# Patient Record
Sex: FEMALE | Race: WHITE | NOT HISPANIC OR LATINO | Employment: FULL TIME | URBAN - METROPOLITAN AREA
[De-identification: names, ages, dates, MRNs, and addresses within clinical notes are randomized per-mention and may not be internally consistent; named-entity substitution may affect disease eponyms.]

---

## 2019-07-09 ENCOUNTER — OFFICE VISIT (OUTPATIENT)
Dept: FAMILY MEDICINE CLINIC | Facility: CLINIC | Age: 19
End: 2019-07-09

## 2019-07-09 VITALS
BODY MASS INDEX: 34.96 KG/M2 | WEIGHT: 190 LBS | TEMPERATURE: 99.2 F | HEART RATE: 62 BPM | DIASTOLIC BLOOD PRESSURE: 77 MMHG | HEIGHT: 62 IN | OXYGEN SATURATION: 98 % | SYSTOLIC BLOOD PRESSURE: 115 MMHG

## 2019-07-09 DIAGNOSIS — E28.2 PCOS (POLYCYSTIC OVARIAN SYNDROME): Chronic | ICD-10-CM

## 2019-07-09 DIAGNOSIS — Z00.00 PREVENTATIVE HEALTH CARE: Primary | ICD-10-CM

## 2019-07-09 DIAGNOSIS — F41.9 ANXIETY: Chronic | ICD-10-CM

## 2019-07-09 DIAGNOSIS — F33.9 EPISODE OF RECURRENT MAJOR DEPRESSIVE DISORDER, UNSPECIFIED DEPRESSION EPISODE SEVERITY (HCC): Chronic | ICD-10-CM

## 2019-07-09 DIAGNOSIS — G47.00 INSOMNIA, UNSPECIFIED TYPE: Chronic | ICD-10-CM

## 2019-07-09 PROBLEM — F32.A DEPRESSION: Chronic | Status: ACTIVE | Noted: 2019-07-09

## 2019-07-09 PROCEDURE — 99204 OFFICE O/P NEW MOD 45 MIN: CPT | Performed by: NURSE PRACTITIONER

## 2019-07-09 RX ORDER — ETONOGESTREL/ETHINYL ESTRADIOL .12-.015MG
RING, VAGINAL VAGINAL
Refills: 11 | COMMUNITY
Start: 2019-06-11 | End: 2020-07-01

## 2019-07-09 RX ORDER — TRAZODONE HYDROCHLORIDE 50 MG/1
TABLET ORAL
Qty: 60 TABLET | Refills: 2 | Status: SHIPPED | OUTPATIENT
Start: 2019-07-09 | End: 2019-09-10 | Stop reason: SINTOL

## 2019-07-09 NOTE — PROGRESS NOTES
Subjective   Jeremiah Cade is a 19 y.o. female.     19-year-old obese white female with history of depression, panic attacks, PCOS who comes in today the stent which is a new patient.  Patient currently is taking a medication for depression and she declines psychiatry referral at this time.  She also complains of insomnia.  I placed her on Zoloft 80 mg and trazodone for sleep and I am getting basic blood work today   blood pressure 140/76 heart rate 52 she denies any chest pain, dyspnea, tachycardia, dizziness or edema   weight is 190 and [facility] and has had an eye examine this year     Zoloft 50 mg daily   trazodone 50 mg 1/2 to 2 tabs q.at bed time as needed sleep   blood work         The following portions of the patient's history were reviewed and updated as appropriate: allergies, current medications, past family history, past medical history, past social history, past surgical history and problem list.    Review of Systems   Eyes: Negative.    Respiratory: Negative.    Cardiovascular: Negative.    Gastrointestinal: Negative.    Genitourinary: Negative.    Neurological: Negative.    Psychiatric/Behavioral: Positive for decreased concentration, sleep disturbance and depressed mood.       Objective   Physical Exam   Constitutional: She is oriented to person, place, and time. She appears well-developed and well-nourished.   Cardiovascular: Normal rate and regular rhythm.   Pulmonary/Chest: Effort normal and breath sounds normal.   Abdominal: Soft. Bowel sounds are normal.   Musculoskeletal: Normal range of motion.   Neurological: She is alert and oriented to person, place, and time.   Skin: Skin is warm and dry. Depressed state with some issues of anxiety and concentration.         Assessment/Plan   Jeremiah was seen today for new patient.    Diagnoses and all orders for this visit:    Preventative health care  -     CBC (No Diff)  -     Comprehensive Metabolic Panel    PCOS (polycystic  ovarian syndrome)    Episode of recurrent major depressive disorder, unspecified depression episode severity (CMS/HCC)    Anxiety    Insomnia, unspecified type    Other orders  -     sertraline (ZOLOFT) 50 MG tablet; Take 1 tablet by mouth Daily.  -     traZODone (DESYREL) 50 MG tablet; 1/2 - 2 tabs 30 minutes before bedtime

## 2019-07-10 ENCOUNTER — TELEPHONE (OUTPATIENT)
Dept: FAMILY MEDICINE CLINIC | Facility: CLINIC | Age: 19
End: 2019-07-10

## 2019-07-10 LAB
ALBUMIN SERPL-MCNC: 4.5 G/DL (ref 3.5–5.5)
ALBUMIN/GLOB SERPL: 1.5 {RATIO} (ref 1.2–2.2)
ALP SERPL-CCNC: 67 IU/L (ref 39–117)
ALT SERPL-CCNC: 11 IU/L (ref 0–32)
AST SERPL-CCNC: 13 IU/L (ref 0–40)
BILIRUB SERPL-MCNC: 0.2 MG/DL (ref 0–1.2)
BUN SERPL-MCNC: 8 MG/DL (ref 6–20)
BUN/CREAT SERPL: 11 (ref 9–23)
CALCIUM SERPL-MCNC: 9.8 MG/DL (ref 8.7–10.2)
CHLORIDE SERPL-SCNC: 103 MMOL/L (ref 96–106)
CO2 SERPL-SCNC: 21 MMOL/L (ref 20–29)
CREAT SERPL-MCNC: 0.71 MG/DL (ref 0.57–1)
ERYTHROCYTE [DISTWIDTH] IN BLOOD BY AUTOMATED COUNT: 13 % (ref 12.3–15.4)
GLOBULIN SER CALC-MCNC: 3 G/DL (ref 1.5–4.5)
GLUCOSE SERPL-MCNC: 83 MG/DL (ref 65–99)
HCT VFR BLD AUTO: 41.4 % (ref 34–46.6)
HGB BLD-MCNC: 13.9 G/DL (ref 11.1–15.9)
MCH RBC QN AUTO: 29.1 PG (ref 26.6–33)
MCHC RBC AUTO-ENTMCNC: 33.6 G/DL (ref 31.5–35.7)
MCV RBC AUTO: 87 FL (ref 79–97)
PLATELET # BLD AUTO: 239 X10E3/UL (ref 150–450)
POTASSIUM SERPL-SCNC: 4.3 MMOL/L (ref 3.5–5.2)
PROT SERPL-MCNC: 7.5 G/DL (ref 6–8.5)
RBC # BLD AUTO: 4.77 X10E6/UL (ref 3.77–5.28)
SODIUM SERPL-SCNC: 140 MMOL/L (ref 134–144)
WBC # BLD AUTO: 6.8 X10E3/UL (ref 3.4–10.8)

## 2019-07-15 ENCOUNTER — OFFICE VISIT (OUTPATIENT)
Dept: FAMILY MEDICINE CLINIC | Facility: CLINIC | Age: 19
End: 2019-07-15

## 2019-07-15 VITALS
WEIGHT: 191.6 LBS | OXYGEN SATURATION: 97 % | TEMPERATURE: 98.7 F | BODY MASS INDEX: 35.26 KG/M2 | HEIGHT: 62 IN | SYSTOLIC BLOOD PRESSURE: 119 MMHG | DIASTOLIC BLOOD PRESSURE: 80 MMHG | HEART RATE: 61 BPM

## 2019-07-15 DIAGNOSIS — K59.1 FUNCTIONAL DIARRHEA: Chronic | ICD-10-CM

## 2019-07-15 DIAGNOSIS — F51.01 PRIMARY INSOMNIA: Primary | Chronic | ICD-10-CM

## 2019-07-15 PROCEDURE — 99214 OFFICE O/P EST MOD 30 MIN: CPT | Performed by: NURSE PRACTITIONER

## 2019-07-15 RX ORDER — DICYCLOMINE HYDROCHLORIDE 10 MG/1
CAPSULE ORAL
Qty: 60 CAPSULE | Refills: 2 | Status: SHIPPED | OUTPATIENT
Start: 2019-07-15 | End: 2019-11-13

## 2019-07-15 NOTE — PROGRESS NOTES
Subjective   Jeremiah Cade is a 19 y.o. female.     19-year-old obese white female with history of depression, anxiety, panic attacks, PCOS, and insomnia who was here on July 9 placed on Zoloft and trazodone.  Patient states trazodone has helped well for sleep but she feels a little weird on the Zoloft.  Symptoms are very vague and very mild and ice it recommended she try it for couple more weeks before we change the dose and she agreed.  Patient still claiming to have diarrhea and I am placing her on Bentyl to see if it helps with her diarrhea  Blood pressure 118/80 heart rate 60 she denies any chest pain, dyspnea, tachycardia, dizziness or edema    Bentyl 10 mg 1-4 times a day   and         The following portions of the patient's history were reviewed and updated as appropriate: allergies, current medications, past family history, past medical history, past social history, past surgical history and problem list.    Review of Systems   Constitutional: Negative.    Respiratory: Negative.    Cardiovascular: Negative.    Gastrointestinal: Positive for diarrhea.   Genitourinary: Negative.    Musculoskeletal: Negative.    Neurological: Negative.    Psychiatric/Behavioral: Negative.        Objective   Physical Exam   Constitutional: She is oriented to person, place, and time. She appears well-developed and well-nourished.   Eyes: EOM are normal.   Cardiovascular: Normal rate and regular rhythm.   Pulmonary/Chest: Effort normal and breath sounds normal.   Abdominal: Soft. Bowel sounds are normal.   Continuing diarrhea   Musculoskeletal: Normal range of motion.   Neurological: She is alert and oriented to person, place, and time.   Skin: Skin is warm.   Psychiatric: She has a normal mood and affect.         Assessment/Plan   Jeremiah was seen today for diarrhea.    Diagnoses and all orders for this visit:    Primary insomnia    Functional diarrhea    Other orders  -     dicyclomine (BENTYL) 10 MG capsule; Take one capsule  1-4x day prn diarrhea

## 2019-08-06 ENCOUNTER — OFFICE VISIT (OUTPATIENT)
Dept: FAMILY MEDICINE CLINIC | Facility: CLINIC | Age: 19
End: 2019-08-06

## 2019-08-06 VITALS
SYSTOLIC BLOOD PRESSURE: 106 MMHG | WEIGHT: 188 LBS | BODY MASS INDEX: 34.39 KG/M2 | DIASTOLIC BLOOD PRESSURE: 66 MMHG | HEART RATE: 120 BPM | TEMPERATURE: 98.8 F | OXYGEN SATURATION: 99 %

## 2019-08-06 DIAGNOSIS — F41.9 ANXIETY: Chronic | ICD-10-CM

## 2019-08-06 DIAGNOSIS — F51.01 PRIMARY INSOMNIA: Chronic | ICD-10-CM

## 2019-08-06 DIAGNOSIS — F33.9 EPISODE OF RECURRENT MAJOR DEPRESSIVE DISORDER, UNSPECIFIED DEPRESSION EPISODE SEVERITY (HCC): Chronic | ICD-10-CM

## 2019-08-06 DIAGNOSIS — K59.1 FUNCTIONAL DIARRHEA: Primary | Chronic | ICD-10-CM

## 2019-08-06 PROCEDURE — 99213 OFFICE O/P EST LOW 20 MIN: CPT | Performed by: NURSE PRACTITIONER

## 2019-08-06 RX ORDER — GINSENG 100 MG
1 CAPSULE ORAL DAILY
Qty: 30 EACH | Refills: 6 | Status: SHIPPED | OUTPATIENT
Start: 2019-08-06 | End: 2020-01-29

## 2019-08-06 RX ORDER — METRONIDAZOLE 500 MG/1
500 TABLET ORAL 3 TIMES DAILY
Qty: 42 TABLET | Refills: 0 | Status: SHIPPED | OUTPATIENT
Start: 2019-08-06 | End: 2019-08-20

## 2019-08-06 NOTE — PATIENT INSTRUCTIONS
Wean off of Zoloft as directed   take acidophilus and Flagyl as directed along with Bentyl   eat food frequently

## 2019-08-06 NOTE — PROGRESS NOTES
Subjective   Jeremiah Cade is a 19 y.o. female.     19-year-old obese white female history depression anxiety, panic attacks, PCOS and insomnia who was recently placed on Zoloft and trazodone.  After that she began having frequent diarrhea and I placed her on becoming 4 times a day.  We tried weaning off trazodone to see if that was causing the diarrhea but it did not help.  I strongly suspect it is a Zoloft causing diarrhea we are going on for that I am placing her on Flagyl acidophilus along with the Bentyl.   She had recent ER visit and was given a 1 L fluid. Patient has not been eating for fear that she will have a bowel movement which is not helping her diarrhea so she is agree to start eating more.  C. difficile was negative other stool cultures are pending blood pressure 106/66 heart rate 118 she denies any chest pain, dyspnea, tachycardia, dizziness or edema       Flagyl 500 mg three times a day times 14 days   acidophilus 1 billion  daily   wean off of the Zoloft as discussed during exam         The following portions of the patient's history were reviewed and updated as appropriate: allergies, current medications, past family history, past medical history, past social history, past surgical history and problem list.    Review of Systems   Constitutional: Negative.    Respiratory: Negative.    Cardiovascular: Negative.    Gastrointestinal: Positive for diarrhea.   Genitourinary: Negative.    Musculoskeletal: Negative.    Skin: Negative.    Neurological: Negative.    Psychiatric/Behavioral: Negative.        Objective   Physical Exam   Constitutional: She is oriented to person, place, and time. She appears well-developed and well-nourished.   Eyes: EOM are normal.   Cardiovascular: Normal rate.   Pulmonary/Chest: Effort normal and breath sounds normal.   Abdominal: Soft. Bowel sounds are normal.   persistant diarrhea   Musculoskeletal: Normal range of motion.   Neurological: She is alert and oriented to  person, place, and time.   Skin: Skin is warm and dry.         Assessment/Plan   Jeremiah was seen today for diarrhea.    Diagnoses and all orders for this visit:    Functional diarrhea    Episode of recurrent major depressive disorder, unspecified depression episode severity (CMS/HCC)    Anxiety    Primary insomnia    Other orders  -     Probiotic Product (ACIDOPHILUS HIGH-POTENCY) capsule; Take 1 tablet by mouth Daily.  -     metroNIDAZOLE (FLAGYL) 500 MG tablet; Take 1 tablet by mouth 3 (Three) Times a Day for 14 days.

## 2019-08-14 ENCOUNTER — OFFICE VISIT (OUTPATIENT)
Dept: FAMILY MEDICINE CLINIC | Facility: CLINIC | Age: 19
End: 2019-08-14

## 2019-08-14 VITALS
DIASTOLIC BLOOD PRESSURE: 80 MMHG | SYSTOLIC BLOOD PRESSURE: 120 MMHG | HEIGHT: 62 IN | OXYGEN SATURATION: 97 % | HEART RATE: 66 BPM | WEIGHT: 186 LBS | BODY MASS INDEX: 34.23 KG/M2 | TEMPERATURE: 99.3 F

## 2019-08-14 DIAGNOSIS — K59.1 FUNCTIONAL DIARRHEA: Chronic | ICD-10-CM

## 2019-08-14 DIAGNOSIS — R50.9 FEVER AND CHILLS: Primary | ICD-10-CM

## 2019-08-14 DIAGNOSIS — E28.2 PCOS (POLYCYSTIC OVARIAN SYNDROME): Chronic | ICD-10-CM

## 2019-08-14 DIAGNOSIS — R23.2 HOT FLASHES: ICD-10-CM

## 2019-08-14 PROCEDURE — 99213 OFFICE O/P EST LOW 20 MIN: CPT | Performed by: NURSE PRACTITIONER

## 2019-08-14 NOTE — PROGRESS NOTES
Subjective   Jeremiah Cade is a 19 y.o. female.     19-year-old obese white female with history of depression anxiety, panic attacks, PCOS, and insomnia who was recently placed on Zoloft and trazodone who was here on August 6 with complaints of diarrhea all day long.  After investigation we are suspicious Zoloft is causing diarrhea.  I placed her on acidophilus and Flagyl and we are weaning off the Zoloft and she states the diarrhea is improving.  All stool samples were negative.  She does have a low-grade fever today and I suspect a UTI due to all the diarrhea but she was unable to void and she will bring in a urine sample  We are going to continue weaning off Zoloft and if diarrhea does not resolve we will send her to GI  She is also complaining of hot flashes and she has had some recent changes to her birth control so I instructed her to call her OB/GYN to make an appointment  Vital signs are stable     keep weaning off of Zoloft   Bring in urine sample   make appointment with OBGYN   if diarrhea does not totally resolve we will go to  gastroenterology         The following portions of the patient's history were reviewed and updated as appropriate: allergies, current medications, past family history, past medical history, past social history, past surgical history and problem list.    Review of Systems   Constitutional: Negative.    Respiratory: Negative.    Cardiovascular: Negative.    Gastrointestinal: Positive for diarrhea.   Genitourinary:        Having hot flashes   Musculoskeletal: Negative.    Skin: Negative.    Neurological: Negative.    Psychiatric/Behavioral: The patient is nervous/anxious.        Objective   Physical Exam   Constitutional: She is oriented to person, place, and time. She appears well-developed and well-nourished.   Cardiovascular: Normal rate and regular rhythm.   Pulmonary/Chest: Effort normal and breath sounds normal.   Abdominal: Soft. Bowel sounds are normal.   Diarrhea has  improved but is not gone yet   Genitourinary:   Genitourinary Comments: Having hot flashes   Musculoskeletal: Normal range of motion.   Neurological: She is alert and oriented to person, place, and time.   Skin: Skin is warm and dry.   Psychiatric: She has a normal mood and affect.         Assessment/Plan   Jeremiah was seen today for diarrhea.    Diagnoses and all orders for this visit:    Fever and chills  -     POC Urinalysis Dipstick, Automated  -     Urine Culture, Routine - Urine, Clean Catch; Future    Functional diarrhea    PCOS (polycystic ovarian syndrome)    Hot flashes

## 2019-08-21 ENCOUNTER — TELEPHONE (OUTPATIENT)
Dept: FAMILY MEDICINE CLINIC | Facility: CLINIC | Age: 19
End: 2019-08-21

## 2019-09-04 ENCOUNTER — OFFICE VISIT (OUTPATIENT)
Dept: FAMILY MEDICINE CLINIC | Facility: CLINIC | Age: 19
End: 2019-09-04

## 2019-09-04 VITALS
HEIGHT: 62 IN | DIASTOLIC BLOOD PRESSURE: 84 MMHG | WEIGHT: 187 LBS | SYSTOLIC BLOOD PRESSURE: 125 MMHG | HEART RATE: 77 BPM | TEMPERATURE: 98 F | BODY MASS INDEX: 34.41 KG/M2 | OXYGEN SATURATION: 99 %

## 2019-09-04 DIAGNOSIS — K59.1 FUNCTIONAL DIARRHEA: Primary | Chronic | ICD-10-CM

## 2019-09-04 DIAGNOSIS — F51.01 PRIMARY INSOMNIA: Chronic | ICD-10-CM

## 2019-09-04 DIAGNOSIS — F41.9 ANXIETY: Chronic | ICD-10-CM

## 2019-09-04 PROCEDURE — 99214 OFFICE O/P EST MOD 30 MIN: CPT | Performed by: NURSE PRACTITIONER

## 2019-09-04 NOTE — PATIENT INSTRUCTIONS
Restart Zoloft at 25 mg daily for 2 weeks if no diarrhea and then start back at 50 mg daily   may consider doing Bentyl 1 to 2 times a week when restarting Zoloft   use  Zantac or Pepcid as needed for stomach issues   follow-up 1 month

## 2019-09-04 NOTE — PROGRESS NOTES
Subjective   Jeremiah Cade is a 19 y.o. female.     19-year-old obese white female with history depression/anxiety, panic attacks, PCOS and insomnia who we placed on Zoloft and trazodone back in early part of December and she developed chronic diarrhea.  After weaning off all of those 2 medications we are safe and assuming it was a trazodone and we are going to try to go back onto her Zoloft now that she has been free of diarrhea for 2 weeks.  She does have some GERD and I am placing her on Zantac or Pepcid as needed.  She also has some Bentyl and I told her she might want to take that once or twice a week when she starts the Zoloft back just in case.   She states her insomnia has actually improved on its own a little bit and we are going to see what happens when she goes back on Zoloft  Vital signs are stable     follow-up 1  month   restart Zoloft at lower dose 25 mg daily for 2 weeks if no diarrhea increase to 50 mg daily   use Zantac or Pepcid as needed for stomach issues   like consider using Bentyl 1 to twice a week when restarting Zoloft         The following portions of the patient's history were reviewed and updated as appropriate: allergies, current medications, past family history, past medical history, past social history, past surgical history and problem list.    Review of Systems   Constitutional: Negative.    HENT: Negative.    Respiratory: Negative.    Cardiovascular: Negative.    Gastrointestinal: Negative.    Genitourinary: Negative.    Musculoskeletal: Negative.    Skin: Negative.    Neurological: Negative.    Psychiatric/Behavioral: Positive for sleep disturbance. The patient is nervous/anxious.        Objective   Physical Exam   Constitutional: She is oriented to person, place, and time. She appears well-developed and well-nourished.   Cardiovascular: Normal rate and regular rhythm.   Pulmonary/Chest: Effort normal and breath sounds normal.   Abdominal: Soft. Bowel sounds are normal.    Musculoskeletal: Normal range of motion.   Neurological: She is alert and oriented to person, place, and time.   Skin: Skin is warm and dry.   Psychiatric: She has a normal mood and affect.         Assessment/Plan   Problems Addressed this Visit        Digestive    Functional diarrhea - Primary (Chronic)       Other    Anxiety (Chronic)    Insomnia disorder (Chronic)

## 2019-09-06 ENCOUNTER — TELEPHONE (OUTPATIENT)
Dept: FAMILY MEDICINE CLINIC | Facility: CLINIC | Age: 19
End: 2019-09-06

## 2019-09-06 NOTE — TELEPHONE ENCOUNTER
Pt called and said she tried to start back on the Zoloft and it gave her diarrhea.  She is wanting to know if you can call her in lexapro.  SG

## 2019-09-09 RX ORDER — ESCITALOPRAM OXALATE 10 MG/1
10 TABLET ORAL DAILY
Qty: 30 TABLET | Refills: 2 | Status: SHIPPED | OUTPATIENT
Start: 2019-09-09 | End: 2019-11-13

## 2019-09-10 ENCOUNTER — OFFICE VISIT (OUTPATIENT)
Dept: FAMILY MEDICINE CLINIC | Facility: CLINIC | Age: 19
End: 2019-09-10

## 2019-09-10 VITALS
RESPIRATION RATE: 18 BRPM | DIASTOLIC BLOOD PRESSURE: 68 MMHG | HEIGHT: 62 IN | WEIGHT: 186 LBS | BODY MASS INDEX: 34.23 KG/M2 | SYSTOLIC BLOOD PRESSURE: 104 MMHG | TEMPERATURE: 98.7 F | OXYGEN SATURATION: 99 % | HEART RATE: 66 BPM

## 2019-09-10 DIAGNOSIS — K59.1 FUNCTIONAL DIARRHEA: Primary | Chronic | ICD-10-CM

## 2019-09-10 DIAGNOSIS — F33.9 EPISODE OF RECURRENT MAJOR DEPRESSIVE DISORDER, UNSPECIFIED DEPRESSION EPISODE SEVERITY (HCC): Chronic | ICD-10-CM

## 2019-09-10 PROCEDURE — 99213 OFFICE O/P EST LOW 20 MIN: CPT | Performed by: FAMILY MEDICINE

## 2019-09-10 RX ORDER — DIPHENOXYLATE HYDROCHLORIDE AND ATROPINE SULFATE 2.5; .025 MG/1; MG/1
1 TABLET ORAL 4 TIMES DAILY PRN
Qty: 24 TABLET | Refills: 0 | Status: SHIPPED | OUTPATIENT
Start: 2019-09-10 | End: 2019-11-13

## 2019-09-10 NOTE — PROGRESS NOTES
Subjective   Jeremiah Cade is a 19 y.o. female.   Chief Complaint   Patient presents with   • Diarrhea         History of Present Illness   Jeremiah presents today for acute visit due to side effect of medications.  She describes starting trazodone back in July and getting diarrhea after that.  She stopped it and the diarrhea went away.  While she was experiencing diarrhea she tried Imodium, Kaopectate, but with a small amount of it stopped diarrhea, until she stopped the trazodone.  Her depression symptoms worsen and she was given Zoloft as replacement.  The diarrhea came back and she stopped the Zoloft a day or 2 ago.  She was advised by [facility] that they can switch her to Lexapro, but she has not started that yet.  Given this really feels that she needs some type of antidepressant.  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  She will experience diarrhea every 15 to 20 minutes and that is still going on today.  This was her first day and training.  She needs a work note and a plan.    Patient Active Problem List    Diagnosis Date Noted   • Hot flashes 08/14/2019   • Functional diarrhea 07/15/2019   • Depression 07/09/2019   • Anxiety 07/09/2019   • Insomnia disorder 07/09/2019   • PCOS (polycystic ovarian syndrome) 07/09/2019   • Allergic rhinitis 12/09/2014   • Irregular menses 01/02/2014   • Otalgia 10/22/2012   • Acne vulgaris 08/28/2012           History reviewed. No pertinent surgical history.    Current Outpatient Medications:   •  busPIRone (BUSPAR) 30 MG tablet, Take 1 tablet by mouth 2 (Two) Times a Day., Disp: 60 tablet, Rfl: 1  •  dicyclomine (BENTYL) 10 MG capsule, Take one capsule 1-4x day prn diarrhea, Disp: 60 capsule, Rfl: 2  •  diphenoxylate-atropine (LOMOTIL) 2.5-0.025 MG per tablet, Take 1 tablet by mouth 4 (Four) Times a Day As Needed for Diarrhea., Disp: 24 tablet, Rfl: 0  •  escitalopram (LEXAPRO) 10 MG tablet, Take 1 tablet by mouth Daily.,  "Disp: 30 tablet, Rfl: 2  •  NUVARING 0.12-0.015 MG/24HR vaginal ring, INSERT 1 RING VAGINALLY AND LEAVE IN PLACE FOR 3 WEEKS, THEN REMOVE FOR 1 RING-FREE WEEK REPEAT, Disp: , Rfl: 11  •  Probiotic Product (ACIDOPHILUS HIGH-POTENCY) capsule, Take 1 tablet by mouth Daily., Disp: 30 each, Rfl: 6  No Known Allergies  Social History     Socioeconomic History   • Marital status: Legally      Spouse name: Not on file   • Number of children: Not on file   • Years of education: Not on file   • Highest education level: Not on file   Tobacco Use   • Smoking status: Current Every Day Smoker     Years: 0.50     Types: Electronic Cigarette   • Smokeless tobacco: Never Used   Substance and Sexual Activity   • Alcohol use: No     Frequency: Never   • Drug use: No   • Sexual activity: Yes     Partners: Male     Birth control/protection: Other     Family History   Problem Relation Age of Onset   • Hypertension Mother    • Hypertension Father    • Heart attack Father    • Cervical cancer Maternal Grandmother    • Diabetes Paternal Grandfather    • Heart attack Paternal Grandmother 40     The following portions of the patient's history were reviewed and updated as appropriate: allergies, current medications, past social history and problem list.    Review of Systems   Constitutional: Negative for chills and fever.   Respiratory: Negative for cough, choking and wheezing.    Cardiovascular: Negative for palpitations and leg swelling.   Gastrointestinal: Negative for abdominal pain and GERD.   Neurological: Negative for headache.       Objective   /68 (BP Location: Left arm, Patient Position: Sitting, Cuff Size: Large Adult)   Pulse 66   Temp 98.7 °F (37.1 °C) (Oral)   Resp 18   Ht 157.5 cm (62\")   Wt 84.4 kg (186 lb)   LMP  (LMP Unknown)   SpO2 99%   BMI 34.02 kg/m²   Physical Exam   Constitutional: She is oriented to person, place, and time. She appears well-developed and well-nourished.   HENT:   Head: " Normocephalic and atraumatic.   Mouth/Throat: Oropharynx is clear and moist.   Eyes: Conjunctivae and EOM are normal. Pupils are equal, round, and reactive to light.   Neck: Neck supple. No JVD present. No thyromegaly present.   Cardiovascular: Normal rate, regular rhythm, normal heart sounds and intact distal pulses.   No murmur heard.  Pulmonary/Chest: Effort normal and breath sounds normal. No respiratory distress. She has no wheezes. She has no rales. She exhibits no tenderness.   Abdominal: Soft. She exhibits no distension and no mass. Bowel sounds are increased. There is no tenderness. There is no rebound and no guarding.   Musculoskeletal: Normal range of motion. She exhibits no edema.   Lymphadenopathy:     She has no cervical adenopathy.   Neurological: She is alert and oriented to person, place, and time.   Skin: Skin is warm. No rash noted.   Psychiatric: She has a normal mood and affect. Her behavior is normal.               Assessment/Plan   Diagnoses and all orders for this visit:    1. Functional diarrhea (Primary)  -     diphenoxylate-atropine (LOMOTIL) 2.5-0.025 MG per tablet; Take 1 tablet by mouth 4 (Four) Times a Day As Needed for Diarrhea.  Dispense: 24 tablet; Refill: 0    2. Episode of recurrent major depressive disorder, unspecified depression episode severity (CMS/HCC)    She was provided with a work note for today.  I think she should try to go back tomorrow.  I gave her a prescription for Lomotil to try to decrease the symptoms.  Remain off the Zoloft.  After she has had no diarrhea for 3 to 4 days try the Lexapro.  Let us know here if the symptoms do not improve with the Lomotil.  Keep regularly scheduled follow-up with [facility] for treatment of her depression and anxiety.             Return if symptoms worsen or fail to improve.

## 2019-09-17 ENCOUNTER — TELEPHONE (OUTPATIENT)
Dept: FAMILY MEDICINE CLINIC | Facility: CLINIC | Age: 19
End: 2019-09-17

## 2019-09-17 NOTE — TELEPHONE ENCOUNTER
Pt called and said the lexapro is doing the same thing zoloft did to her.  Causing horrible diarrhea.  Wants to know what you want her do it.  Make Appt or just try something different.  SG

## 2019-09-18 RX ORDER — BUSPIRONE HYDROCHLORIDE 30 MG/1
30 TABLET ORAL 2 TIMES DAILY
Qty: 60 TABLET | Refills: 1 | Status: SHIPPED | OUTPATIENT
Start: 2019-09-18 | End: 2019-11-13 | Stop reason: SINTOL

## 2019-09-18 NOTE — TELEPHONE ENCOUNTER
Make sure she has stop taking the Lexapro and the diarrhea has stopped before starting the BusPar 30 mg twice a day that I ordered an [facility]

## 2019-11-13 ENCOUNTER — OFFICE VISIT (OUTPATIENT)
Dept: FAMILY MEDICINE CLINIC | Facility: CLINIC | Age: 19
End: 2019-11-13

## 2019-11-13 VITALS
HEART RATE: 68 BPM | BODY MASS INDEX: 33.86 KG/M2 | SYSTOLIC BLOOD PRESSURE: 115 MMHG | WEIGHT: 184 LBS | OXYGEN SATURATION: 97 % | DIASTOLIC BLOOD PRESSURE: 70 MMHG | TEMPERATURE: 97.8 F | HEIGHT: 62 IN | RESPIRATION RATE: 16 BRPM

## 2019-11-13 DIAGNOSIS — K58.1 IRRITABLE BOWEL SYNDROME WITH CONSTIPATION: Primary | ICD-10-CM

## 2019-11-13 DIAGNOSIS — F33.9 EPISODE OF RECURRENT MAJOR DEPRESSIVE DISORDER, UNSPECIFIED DEPRESSION EPISODE SEVERITY (HCC): Chronic | ICD-10-CM

## 2019-11-13 PROCEDURE — 99213 OFFICE O/P EST LOW 20 MIN: CPT | Performed by: FAMILY MEDICINE

## 2019-11-13 NOTE — PROGRESS NOTES
Subjective   Jeremiah Cade is a 19 y.o. female.   Chief Complaint   Patient presents with   • GI Problem     would like referral to GI for c/o changing bowel patterns even after stopping anti-depressants that were causing issues.        History of Present Illness   Returns today to follow-up on depression.  I saw her back in September once.  She had side effects of Zoloft.  He stopped that and was transitioned to a low dose of Lexapro.  Since that time, has stopped lexapro and still having bowel problems.  Describes having constipation alternating with diarrhea.  Bentyl has slowed down the diarrhea.  She tells me her bowels were fine until June or July of this year.  That was around the time that she was on Zoloft.  Initially she had diarrhea 5-6 times a day.  She was on the toilet for 30 minutes of time.  This was suspected to be a side effect of Zoloft.  It was discontinued and she was eventually trialed on Lexapro, which may also cause diarrhea.  She is been off all medicines for 2 months.  Since that time she is been mostly constipated.  She will occasionally have a day or 2 of prominently diarrheal stools the last day or 2.  She describes a lot of abdominal bloating and gas.      Patient Active Problem List    Diagnosis Date Noted   • Hot flashes 08/14/2019   • Functional diarrhea 07/15/2019   • Depression 07/09/2019   • Anxiety 07/09/2019   • Insomnia disorder 07/09/2019   • PCOS (polycystic ovarian syndrome) 07/09/2019   • Allergic rhinitis 12/09/2014   • Irregular menses 01/02/2014   • Otalgia 10/22/2012   • Acne vulgaris 08/28/2012           History reviewed. No pertinent surgical history.  Current Outpatient Medications on File Prior to Visit   Medication Sig   • NUVARING 0.12-0.015 MG/24HR vaginal ring INSERT 1 RING VAGINALLY AND LEAVE IN PLACE FOR 3 WEEKS, THEN REMOVE FOR 1 RING-FREE WEEK REPEAT   • Probiotic Product (ACIDOPHILUS HIGH-POTENCY) capsule Take 1 tablet by mouth Daily. (Patient taking  differently: Take 1 tablet by mouth Daily As Needed.)   • [DISCONTINUED] busPIRone (BUSPAR) 30 MG tablet Take 1 tablet by mouth 2 (Two) Times a Day.   • [DISCONTINUED] dicyclomine (BENTYL) 10 MG capsule Take one capsule 1-4x day prn diarrhea   • [DISCONTINUED] diphenoxylate-atropine (LOMOTIL) 2.5-0.025 MG per tablet Take 1 tablet by mouth 4 (Four) Times a Day As Needed for Diarrhea.   • [DISCONTINUED] escitalopram (LEXAPRO) 10 MG tablet Take 1 tablet by mouth Daily.     No current facility-administered medications on file prior to visit.      No Known Allergies  Social History     Socioeconomic History   • Marital status: Legally      Spouse name: Not on file   • Number of children: Not on file   • Years of education: Not on file   • Highest education level: Not on file   Tobacco Use   • Smoking status: Current Every Day Smoker     Years: 0.50     Types: Electronic Cigarette   • Smokeless tobacco: Never Used   Substance and Sexual Activity   • Alcohol use: No     Frequency: Never   • Drug use: No   • Sexual activity: Yes     Partners: Male     Birth control/protection: Other     Family History   Problem Relation Age of Onset   • Hypertension Mother    • Hypertension Father    • Heart attack Father    • Cervical cancer Maternal Grandmother    • Diabetes Paternal Grandfather    • Heart attack Paternal Grandmother 40     The following portions of the patient's history were reviewed and updated as appropriate: allergies, current medications and problem list.    Review of Systems   Constitutional: Positive for unexpected weight loss (has lost 10 pounds). Negative for chills and fever.   Respiratory: Negative for cough and shortness of breath.    Cardiovascular: Negative for chest pain.   Neurological: Negative for light-headedness and headache.       Objective   /70 (BP Location: Right arm, Patient Position: Sitting, Cuff Size: Adult)   Pulse 68   Temp 97.8 °F (36.6 °C) (Oral)   Resp 16   Ht 157.5 cm  "(62\")   Wt 83.5 kg (184 lb)   SpO2 97%   BMI 33.65 kg/m²   Physical Exam   Constitutional: She is oriented to person, place, and time. She appears well-developed and well-nourished.   HENT:   Head: Normocephalic and atraumatic.   Eyes: Conjunctivae and EOM are normal.   Neck: Normal range of motion.   Cardiovascular: Normal rate.   Pulmonary/Chest: Effort normal.   Abdominal: Soft. She exhibits distension. She exhibits no mass. Bowel sounds are increased. There is generalized tenderness. There is no rebound and no guarding.   Musculoskeletal: Normal range of motion.   Neurological: She is alert and oriented to person, place, and time.   Skin: Skin is warm and dry. No rash noted.   Psychiatric: She has a normal mood and affect. Her behavior is normal.       Assessment/Plan   Diagnoses and all orders for this visit:    1. Episode of recurrent major depressive disorder, unspecified depression episode severity (CMS/HCC) (Primary)    2. Irritable bowel syndrome with constipation  -     Ambulatory Referral to Gastroenterology    Given samples of Linzess 145 mcg tablets #14.        It sounds like constipation dominant irritable bowel syndrome.  They could even be the stress that she was experiencing that prompted the initiation of Zoloft could have worsened the irritable bowel syndrome.  She had stool testing done early August at [facility] that was all negative.  We will not repeat that at the present time.  We will do a trial of Linzess 145 mcg/day and refer her to GI for further evaluation.  I note that she had CT scan of her pelvis that was essentially normal in August of last year.  That CT was limited to her pelvis, so repeat CT scan and/or endoscopy may be necessary.  It does not sound like diarrhea has been predominant enough that I would test her for inflammatory bowel disease just yet, but that remains in the differential.  Recheck here about 2 weeks after she had a trial of Linzess.  Call if any problems " or concerns before then.  We will need to reevaluate her depression at a later date.       Return in about 2 weeks (around 11/27/2019), or if symptoms worsen or fail to improve.

## 2019-11-18 ENCOUNTER — TELEPHONE (OUTPATIENT)
Dept: FAMILY MEDICINE CLINIC | Facility: CLINIC | Age: 19
End: 2019-11-18

## 2019-11-18 NOTE — TELEPHONE ENCOUNTER
Pt started taking linzess last week, she hasnt taken any today. She has had horrible diarrhea since starting, cant stay out of the bathroom, please advise.

## 2019-11-18 NOTE — TELEPHONE ENCOUNTER
Agree with advice to stop the Linzess.  She had been constipated  recently, and as that is no longer the case, I think allowing return of normal bowel function will be appropriate.

## 2020-01-28 NOTE — PROGRESS NOTES
Subjective   Jeremiah Cade is a 19 y.o. female.   Chief Complaint   Patient presents with   • Dizziness     feels like she has been on a merry go round for the past week associated with nausea and light headedness.        History of Present Illness   Presents to the office today for an acute problem.  For the past week she has had symptoms of lightheadedness and dizziness.  Sometimes this feels like she is on a merry-go-round.  She has had occasional nausea.  Occasional headache.  She will have hot flashes where she feels suddenly warm.  Denies any cough, body aches.  At the onset of the lightheadedness and dizziness, she had several episodes of vomiting.  This was not preceded by any abdominal pain.  Gradually all the above symptoms do seem to be improving.  She is continuing to eat and drink.    She is not pregnant.  She uses a NuvaRing.      Patient Active Problem List    Diagnosis Date Noted   • Hot flashes 08/14/2019   • Functional diarrhea 07/15/2019   • Depression 07/09/2019   • Anxiety 07/09/2019   • Insomnia disorder 07/09/2019   • PCOS (polycystic ovarian syndrome) 07/09/2019   • Allergic rhinitis 12/09/2014   • Irregular menses 01/02/2014   • Otalgia 10/22/2012   • Acne vulgaris 08/28/2012           History reviewed. No pertinent surgical history.  Current Outpatient Medications on File Prior to Visit   Medication Sig   • NUVARING 0.12-0.015 MG/24HR vaginal ring INSERT 1 RING VAGINALLY AND LEAVE IN PLACE FOR 3 WEEKS, THEN REMOVE FOR 1 RING-FREE WEEK REPEAT   • [DISCONTINUED] Probiotic Product (ACIDOPHILUS HIGH-POTENCY) capsule Take 1 tablet by mouth Daily. (Patient taking differently: Take 1 tablet by mouth Daily As Needed.)     No current facility-administered medications on file prior to visit.      No Known Allergies  Social History     Socioeconomic History   • Marital status: Legally      Spouse name: Not on file   • Number of children: Not on file   • Years of education: Not on file   •  "Highest education level: Not on file   Tobacco Use   • Smoking status: Current Every Day Smoker     Years: 0.50     Types: Electronic Cigarette   • Smokeless tobacco: Never Used   Substance and Sexual Activity   • Alcohol use: No     Frequency: Never   • Drug use: No   • Sexual activity: Yes     Partners: Male     Birth control/protection: Other     Family History   Problem Relation Age of Onset   • Hypertension Mother    • Hypertension Father    • Heart attack Father    • Cervical cancer Maternal Grandmother    • Diabetes Paternal Grandfather    • Heart attack Paternal Grandmother 40     The following portions of the patient's history were reviewed and updated as appropriate: allergies, current medications, past family history, past medical history, past social history, past surgical history and problem list.    Review of Systems   Constitutional: Negative for chills and fever.   Eyes: Negative for visual disturbance.   Respiratory: Negative for cough and shortness of breath.    Cardiovascular: Negative for chest pain.   Gastrointestinal: Negative for abdominal pain.   Neurological: Positive for light-headedness. Negative for headache.       Objective   /71 (BP Location: Right arm, Patient Position: Sitting, Cuff Size: Large Adult)   Pulse 67   Temp 99.1 °F (37.3 °C) (Oral)   Resp 16   Ht 157.5 cm (62\")   Wt 83.5 kg (184 lb)   SpO2 98%   BMI 33.65 kg/m²   Physical Exam   Constitutional: She is oriented to person, place, and time. She appears well-developed and well-nourished.   HENT:   Head: Normocephalic and atraumatic.   Right Ear: External ear normal. No tenderness. Tympanic membrane is not injected, not perforated, not erythematous and not bulging. A middle ear effusion (clear) is present. No decreased hearing is noted.   Left Ear: External ear normal. No tenderness. Tympanic membrane is not injected, not perforated, not erythematous and not bulging. A middle ear effusion (clear) is present. No " decreased hearing is noted.   Nose: Nose normal. Right sinus exhibits no maxillary sinus tenderness and no frontal sinus tenderness. Left sinus exhibits no maxillary sinus tenderness and no frontal sinus tenderness.   Mouth/Throat: Uvula is midline and mucous membranes are normal. Posterior oropharyngeal erythema (mild) present. Tonsils are 0 on the right. Tonsils are 0 on the left.   Eyes: Conjunctivae and EOM are normal.   Neck: Normal range of motion.   Cardiovascular: Normal rate, regular rhythm and normal heart sounds.   Pulmonary/Chest: Effort normal and breath sounds normal. No respiratory distress.   Musculoskeletal: Normal range of motion.   Neurological: She is alert and oriented to person, place, and time.   Skin: Skin is warm and dry. No rash noted.   Psychiatric: She has a normal mood and affect. Her behavior is normal.               Assessment/Plan   Diagnoses and all orders for this visit:    1. Labyrinthitis of both ears (Primary)    Other orders  -     meclizine (ANTIVERT) 12.5 MG tablet; Take 1 tablet by mouth 3 (Three) Times a Day for 5 days.  Dispense: 15 tablet; Refill: 0      Seems to be viral labyrinthitis.  Treat with Antivert 1 pill 3 times a day for 5 days.  Make sure she keeps fluid intake up.  Call me early next week if symptoms worsen or fail to improve as expected.  Counseled her that there is a possibility of a secondary infection, so if she gets worse, call us back.            Return if symptoms worsen or fail to improve.    Call with any problems or concerns before next visit

## 2020-01-29 ENCOUNTER — OFFICE VISIT (OUTPATIENT)
Dept: FAMILY MEDICINE CLINIC | Facility: CLINIC | Age: 20
End: 2020-01-29

## 2020-01-29 VITALS
DIASTOLIC BLOOD PRESSURE: 71 MMHG | RESPIRATION RATE: 16 BRPM | WEIGHT: 184 LBS | TEMPERATURE: 99.1 F | SYSTOLIC BLOOD PRESSURE: 106 MMHG | BODY MASS INDEX: 33.86 KG/M2 | HEIGHT: 62 IN | HEART RATE: 67 BPM | OXYGEN SATURATION: 98 %

## 2020-01-29 DIAGNOSIS — H83.03 LABYRINTHITIS OF BOTH EARS: Primary | ICD-10-CM

## 2020-01-29 PROCEDURE — 99213 OFFICE O/P EST LOW 20 MIN: CPT | Performed by: FAMILY MEDICINE

## 2020-01-29 RX ORDER — MECLIZINE HCL 12.5 MG/1
12.5 TABLET ORAL
Qty: 15 TABLET | Refills: 0 | Status: SHIPPED | OUTPATIENT
Start: 2020-01-29 | End: 2020-02-03

## 2020-02-19 ENCOUNTER — OFFICE VISIT (OUTPATIENT)
Dept: FAMILY MEDICINE CLINIC | Facility: CLINIC | Age: 20
End: 2020-02-19

## 2020-02-19 VITALS
RESPIRATION RATE: 16 BRPM | TEMPERATURE: 98.9 F | WEIGHT: 181 LBS | OXYGEN SATURATION: 98 % | HEIGHT: 62 IN | BODY MASS INDEX: 33.31 KG/M2

## 2020-02-19 DIAGNOSIS — H66.002 NON-RECURRENT ACUTE SUPPURATIVE OTITIS MEDIA OF LEFT EAR WITHOUT SPONTANEOUS RUPTURE OF TYMPANIC MEMBRANE: Primary | ICD-10-CM

## 2020-02-19 DIAGNOSIS — J02.9 SORE THROAT: ICD-10-CM

## 2020-02-19 DIAGNOSIS — F33.9 EPISODE OF RECURRENT MAJOR DEPRESSIVE DISORDER, UNSPECIFIED DEPRESSION EPISODE SEVERITY (HCC): Chronic | ICD-10-CM

## 2020-02-19 LAB
EXPIRATION DATE: NORMAL
INTERNAL CONTROL: NORMAL
Lab: NORMAL
S PYO AG THROAT QL: NEGATIVE

## 2020-02-19 PROCEDURE — 87880 STREP A ASSAY W/OPTIC: CPT | Performed by: FAMILY MEDICINE

## 2020-02-19 PROCEDURE — 99213 OFFICE O/P EST LOW 20 MIN: CPT | Performed by: FAMILY MEDICINE

## 2020-02-19 RX ORDER — AMOXICILLIN AND CLAVULANATE POTASSIUM 875; 125 MG/1; MG/1
1 TABLET, FILM COATED ORAL 2 TIMES DAILY
Qty: 14 TABLET | Refills: 0 | Status: SHIPPED | OUTPATIENT
Start: 2020-02-19 | End: 2020-02-26

## 2020-02-19 NOTE — PROGRESS NOTES
Subjective   Jeremiah Cade is a 19 y.o. female.   Chief Complaint   Patient presents with   • Dizziness     pronounced with hot flashes, sore throat upon rising.       History of Present Illness   Comes in today with ongoing dizziness.  It is a little better.  I saw her 3 weeks ago.  Diagnosis then was labyrinthitis, probably viral.  She was treated with meclizine.  She did not have any dizziness at all while on the meclizine.  She used the mall in a few days ago she began developing occasional hot flashes and general lightheadedness.  She denies any fevers or chills.  When she swallows her left ear pops.  Throat is sometimes sore when swallowing.  Worse in the mornings.  Admits to hot flashes are rare.  She recently was changed to a generic NuvaRing, and she read that that might cause occasional hot flashes.    She also has some anxiety and depression.  She had a lot of problems with side effects of SSRIs.  Was not able to work x 2 months.  Has been off all antidepressants x months.   Restarted zoloft recently at low dose few weeks ago- no diarrhea, no other SE!!  Mood feels much more stable.  Less anxious.  More optimistic.  Back to work.      Patient Active Problem List    Diagnosis Date Noted   • Hot flashes 08/14/2019   • Functional diarrhea 07/15/2019   • Depression 07/09/2019   • Anxiety 07/09/2019   • Insomnia disorder 07/09/2019   • PCOS (polycystic ovarian syndrome) 07/09/2019   • Allergic rhinitis 12/09/2014   • Irregular menses 01/02/2014   • Otalgia 10/22/2012   • Acne vulgaris 08/28/2012           No past surgical history on file.  Current Outpatient Medications on File Prior to Visit   Medication Sig   • NUVARING 0.12-0.015 MG/24HR vaginal ring INSERT 1 RING VAGINALLY AND LEAVE IN PLACE FOR 3 WEEKS, THEN REMOVE FOR 1 RING-FREE WEEK REPEAT   • [DISCONTINUED] sertraline (ZOLOFT) 50 MG tablet Take 25 mg by mouth Daily.     No current facility-administered medications on file prior to visit.      No  "Known Allergies  Social History     Socioeconomic History   • Marital status: Legally      Spouse name: Not on file   • Number of children: Not on file   • Years of education: Not on file   • Highest education level: Not on file   Tobacco Use   • Smoking status: Current Every Day Smoker     Packs/day: 0.00     Years: 0.50     Pack years: 0.00     Types: Electronic Cigarette   • Smokeless tobacco: Never Used   Substance and Sexual Activity   • Alcohol use: No     Frequency: Never   • Drug use: No   • Sexual activity: Yes     Partners: Male     Birth control/protection: Inserts, Other     Family History   Problem Relation Age of Onset   • Hypertension Mother    • Hypertension Father    • Heart attack Father    • Cervical cancer Maternal Grandmother    • Cancer Maternal Grandmother    • Diabetes Paternal Grandfather    • Heart attack Paternal Grandmother 40     The following portions of the patient's history were reviewed and updated as appropriate: allergies, current medications, past social history and problem list    Review of Systems   Constitutional: Negative for chills and fever.   Respiratory: Negative for cough and shortness of breath.    Cardiovascular: Negative for chest pain and palpitations.   Gastrointestinal: Negative for abdominal pain, diarrhea, nausea and vomiting.   Neurological: Positive for light-headedness. Negative for syncope and headache.       Objective   Temp 98.9 °F (37.2 °C) (Oral)   Resp 16   Ht 157.5 cm (62\")   Wt 82.1 kg (181 lb)   SpO2 98%   BMI 33.11 kg/m²   Physical Exam   Constitutional: She is oriented to person, place, and time. She appears well-developed and well-nourished.   HENT:   Head: Normocephalic and atraumatic.   Right Ear: Tympanic membrane, external ear and ear canal normal. cerumen impaction is not present.  Left Ear: External ear and ear canal normal. Tympanic membrane is injected, erythematous and bulging. A middle ear effusion is present. An impacted " cerumen is not present.  Eyes: Conjunctivae and EOM are normal.   Neck: Normal range of motion.   Cardiovascular: Normal rate, regular rhythm and normal heart sounds.   No murmur heard.  Pulmonary/Chest: Effort normal and breath sounds normal. No respiratory distress.   Musculoskeletal: Normal range of motion.   Neurological: She is alert and oriented to person, place, and time.   Skin: Skin is warm and dry. No rash noted.   Psychiatric: She has a normal mood and affect. Her behavior is normal.           Office Visit on 02/19/2020   Component Date Value Ref Range Status   • Rapid Strep A Screen 02/19/2020 Negative  Negative, VALID, INVALID, Not Performed Final   • Internal Control 02/19/2020 Passed  Passed Final   • Lot Number 02/19/2020 [id number]   Final   • Expiration Date 02/19/2020 [id number]   Final         Assessment/Plan   Diagnoses and all orders for this visit:    1. Non-recurrent acute suppurative otitis media of left ear without spontaneous rupture of tympanic membrane (Primary)  -     amoxicillin-clavulanate (AUGMENTIN) 875-125 MG per tablet; Take 1 tablet by mouth 2 (Two) Times a Day for 7 days.  Dispense: 14 tablet; Refill: 0    2. Sore throat  -     POCT rapid strep A    3. Episode of recurrent major depressive disorder, unspecified depression episode severity (CMS/HCC)    I suspect she was developing a viral URI.  Largely the symptoms seem to have improved, and she appears to have a secondary left otitis media now.  Recommend continuing Mucinex twice daily, lots of water.  Treat with Augmentin as above.  If symptoms recur, fail to improve, or worsen, let me know.  Since depression is responding to Zoloft and she is tolerating it, continue that at current dose of 25 mg/day.  Follow-up on depression later this summer.           Return if symptoms worsen or fail to improve.    Call with any problems or concerns before next visit

## 2020-02-20 RX ORDER — SERTRALINE HYDROCHLORIDE 25 MG/1
1 TABLET, FILM COATED ORAL DAILY
COMMUNITY
End: 2020-02-20 | Stop reason: SDUPTHER

## 2020-02-20 RX ORDER — SERTRALINE HYDROCHLORIDE 25 MG/1
25 TABLET, FILM COATED ORAL DAILY
Qty: 90 TABLET | Refills: 3 | Status: SHIPPED | OUTPATIENT
Start: 2020-02-20 | End: 2020-07-31

## 2020-02-20 NOTE — TELEPHONE ENCOUNTER
Patient called into office and would like a refill on her 25mg of Zoloft to [facility]. Medication is loaded and ready to send.

## 2020-02-24 ENCOUNTER — TELEPHONE (OUTPATIENT)
Dept: FAMILY MEDICINE CLINIC | Facility: CLINIC | Age: 20
End: 2020-02-24

## 2020-02-24 NOTE — TELEPHONE ENCOUNTER
Received voicemail from patient stating that she is having diarrhea associated with the use of Augmentin. Returned patient's call at this time. Advised her that loose stool is common with antibiotic use. Advised her that she may use OTC Immodium for the loose stools and that she may also utilize Align or Activia to help maintain the good GI gisele. She voiced understanding. Just FYI.

## 2020-02-24 NOTE — TELEPHONE ENCOUNTER
Agree with the above advice.  If she calls back and continues to have diarrhea despite these treatments.  Discontinue the Augmentin and let me know.  Thanks

## 2020-03-02 ENCOUNTER — HOSPITAL ENCOUNTER (EMERGENCY)
Facility: HOSPITAL | Age: 20
Discharge: HOME | End: 2020-03-03
Attending: EMERGENCY MEDICINE | Admitting: EMERGENCY MEDICINE

## 2020-03-02 ENCOUNTER — APPOINTMENT (OUTPATIENT)
Dept: CT IMAGING | Facility: HOSPITAL | Age: 20
End: 2020-03-02

## 2020-03-02 DIAGNOSIS — R50.9 FEVER AND CHILLS: Primary | ICD-10-CM

## 2020-03-02 DIAGNOSIS — K62.89 PERIANAL PAIN: ICD-10-CM

## 2020-03-02 LAB
ALBUMIN SERPL-MCNC: 4.2 G/DL (ref 3.5–5.2)
ALBUMIN/GLOB SERPL: 1.3 G/DL
ALP SERPL-CCNC: 85 U/L (ref 39–117)
ALT SERPL W P-5'-P-CCNC: 22 U/L (ref 1–33)
ANION GAP SERPL CALCULATED.3IONS-SCNC: 13 MMOL/L (ref 5–15)
AST SERPL-CCNC: 16 U/L (ref 1–32)
BASOPHILS # BLD AUTO: 0.1 10*3/MM3 (ref 0–0.2)
BASOPHILS NFR BLD AUTO: 0.7 % (ref 0–1.5)
BILIRUB SERPL-MCNC: 0.3 MG/DL (ref 0.2–1.2)
BUN BLD-MCNC: 6 MG/DL (ref 6–20)
BUN/CREAT SERPL: 9.4 (ref 7–25)
CALCIUM SPEC-SCNC: 9.3 MG/DL (ref 8.6–10.5)
CHLORIDE SERPL-SCNC: 100 MMOL/L (ref 98–107)
CO2 SERPL-SCNC: 25 MMOL/L (ref 22–29)
CREAT BLD-MCNC: 0.64 MG/DL (ref 0.57–1)
DEPRECATED RDW RBC AUTO: 38.9 FL (ref 37–54)
EOSINOPHIL # BLD AUTO: 0.1 10*3/MM3 (ref 0–0.4)
EOSINOPHIL NFR BLD AUTO: 0.5 % (ref 0.3–6.2)
ERYTHROCYTE [DISTWIDTH] IN BLOOD BY AUTOMATED COUNT: 12.6 % (ref 12.3–15.4)
GFR SERPL CREATININE-BSD FRML MDRD: 117 ML/MIN/1.73
GLOBULIN UR ELPH-MCNC: 3.3 GM/DL
GLUCOSE BLD-MCNC: 109 MG/DL (ref 65–99)
HCT VFR BLD AUTO: 41 % (ref 34–46.6)
HGB BLD-MCNC: 13.6 G/DL (ref 12–15.9)
LYMPHOCYTES # BLD AUTO: 0.6 10*3/MM3 (ref 0.7–3.1)
LYMPHOCYTES NFR BLD AUTO: 4.3 % (ref 19.6–45.3)
MCH RBC QN AUTO: 29 PG (ref 26.6–33)
MCHC RBC AUTO-ENTMCNC: 33.1 G/DL (ref 31.5–35.7)
MCV RBC AUTO: 87.7 FL (ref 79–97)
MONOCYTES # BLD AUTO: 0.6 10*3/MM3 (ref 0.1–0.9)
MONOCYTES NFR BLD AUTO: 4.5 % (ref 5–12)
NEUTROPHILS # BLD AUTO: 12.2 10*3/MM3 (ref 1.7–7)
NEUTROPHILS NFR BLD AUTO: 90 % (ref 42.7–76)
NRBC BLD AUTO-RTO: 0 /100 WBC (ref 0–0.2)
PLATELET # BLD AUTO: 190 10*3/MM3 (ref 140–450)
PMV BLD AUTO: 10 FL (ref 6–12)
POTASSIUM BLD-SCNC: 3.8 MMOL/L (ref 3.5–5.2)
PROT SERPL-MCNC: 7.5 G/DL (ref 6–8.5)
RBC # BLD AUTO: 4.68 10*6/MM3 (ref 3.77–5.28)
SODIUM BLD-SCNC: 138 MMOL/L (ref 136–145)
WBC NRBC COR # BLD: 13.6 10*3/MM3 (ref 3.4–10.8)

## 2020-03-02 PROCEDURE — 0 IOPAMIDOL PER 1 ML: Performed by: EMERGENCY MEDICINE

## 2020-03-02 PROCEDURE — 99285 EMERGENCY DEPT VISIT HI MDM: CPT

## 2020-03-02 PROCEDURE — 80053 COMPREHEN METABOLIC PANEL: CPT | Performed by: NURSE PRACTITIONER

## 2020-03-02 PROCEDURE — 74177 CT ABD & PELVIS W/CONTRAST: CPT

## 2020-03-02 PROCEDURE — 85025 COMPLETE CBC W/AUTO DIFF WBC: CPT | Performed by: NURSE PRACTITIONER

## 2020-03-02 PROCEDURE — 87040 BLOOD CULTURE FOR BACTERIA: CPT | Performed by: NURSE PRACTITIONER

## 2020-03-02 RX ORDER — ONDANSETRON 4 MG/1
4 TABLET, ORALLY DISINTEGRATING ORAL 4 TIMES DAILY PRN
Qty: 10 TABLET | Refills: 0 | Status: SHIPPED | OUTPATIENT
Start: 2020-03-02 | End: 2020-07-01

## 2020-03-02 RX ORDER — SODIUM CHLORIDE 0.9 % (FLUSH) 0.9 %
10 SYRINGE (ML) INJECTION AS NEEDED
Status: DISCONTINUED | OUTPATIENT
Start: 2020-03-02 | End: 2020-03-03 | Stop reason: HOSPADM

## 2020-03-02 RX ORDER — ACETAMINOPHEN AND CODEINE PHOSPHATE 300; 30 MG/1; MG/1
2 TABLET ORAL EVERY 6 HOURS PRN
Qty: 20 TABLET | Refills: 0 | Status: SHIPPED | OUTPATIENT
Start: 2020-03-02 | End: 2020-03-06 | Stop reason: SDUPTHER

## 2020-03-02 RX ORDER — IBUPROFEN 400 MG/1
800 TABLET ORAL ONCE
Status: COMPLETED | OUTPATIENT
Start: 2020-03-02 | End: 2020-03-02

## 2020-03-02 RX ORDER — SULFAMETHOXAZOLE AND TRIMETHOPRIM 800; 160 MG/1; MG/1
1 TABLET ORAL 2 TIMES DAILY
Qty: 20 TABLET | Refills: 0 | Status: SHIPPED | OUTPATIENT
Start: 2020-03-02 | End: 2020-03-06

## 2020-03-02 RX ADMIN — IOPAMIDOL 100 ML: 755 INJECTION, SOLUTION INTRAVENOUS at 23:15

## 2020-03-02 RX ADMIN — IBUPROFEN 800 MG: 400 TABLET ORAL at 21:21

## 2020-03-03 ENCOUNTER — TELEPHONE (OUTPATIENT)
Dept: FAMILY MEDICINE CLINIC | Facility: CLINIC | Age: 20
End: 2020-03-03

## 2020-03-03 VITALS
TEMPERATURE: 98.5 F | BODY MASS INDEX: 33.67 KG/M2 | OXYGEN SATURATION: 99 % | HEART RATE: 72 BPM | HEIGHT: 62 IN | SYSTOLIC BLOOD PRESSURE: 120 MMHG | RESPIRATION RATE: 16 BRPM | WEIGHT: 182.98 LBS | DIASTOLIC BLOOD PRESSURE: 57 MMHG

## 2020-03-03 PROCEDURE — 25010000002 CEFTRIAXONE PER 250 MG: Performed by: NURSE PRACTITIONER

## 2020-03-03 PROCEDURE — 96374 THER/PROPH/DIAG INJ IV PUSH: CPT

## 2020-03-03 RX ADMIN — CEFTRIAXONE SODIUM 1 G: 10 INJECTION, POWDER, FOR SOLUTION INTRAVENOUS at 00:17

## 2020-03-03 NOTE — TELEPHONE ENCOUNTER
Patient called into office again re: diarrhea and left a voicemail stating that it was really bad and orange. She states that she was just in the ER last night for an abscess. Returned patient's call. Patient states that she has since taken Immodium and align and it has helped her loose stool and that she has not had an instance since then. She has a follow up scheduled with us on 3-6-2020 for the ER visit.

## 2020-03-03 NOTE — ED PROVIDER NOTES
Subjective   Patient is a 19-year-old female who presents with pain around the rectum.  She states that she has had a perirectal abscess in the past and feels that she may have 1 again.  She states that her symptoms have gradually been increasing over the last week and today she began developing a fever and increased pain which brought her to the emergency room today.  She has had no nausea no vomiting she denies trauma to the area.  She rates her pain a 7/10.          Review of Systems   Constitutional: Negative for chills, fatigue and fever.   HENT: Negative for congestion, tinnitus and trouble swallowing.    Eyes: Negative for photophobia, discharge and redness.   Respiratory: Negative for cough and shortness of breath.    Cardiovascular: Negative for chest pain and palpitations.   Gastrointestinal: Negative for abdominal pain, diarrhea, nausea and vomiting.   Genitourinary: Negative for dysuria, frequency, urgency and vaginal pain.        Pain around the rectum   Musculoskeletal: Negative for back pain, joint swelling and myalgias.   Skin: Negative for rash.   Neurological: Negative for dizziness and headaches.   Psychiatric/Behavioral: Negative for confusion.   All other systems reviewed and are negative.      Past Medical History:   Diagnosis Date   • Depression        No Known Allergies    History reviewed. No pertinent surgical history.    Family History   Problem Relation Age of Onset   • Hypertension Mother    • Hypertension Father    • Heart attack Father    • Cervical cancer Maternal Grandmother    • Cancer Maternal Grandmother    • Diabetes Paternal Grandfather    • Heart attack Paternal Grandmother 40       Social History     Socioeconomic History   • Marital status: Legally      Spouse name: Not on file   • Number of children: Not on file   • Years of education: Not on file   • Highest education level: Not on file   Tobacco Use   • Smoking status: Current Every Day Smoker     Packs/day: 0.00  "    Years: 0.50     Pack years: 0.00     Types: Electronic Cigarette   • Smokeless tobacco: Never Used   Substance and Sexual Activity   • Alcohol use: No     Frequency: Never   • Drug use: No   • Sexual activity: Yes     Partners: Male     Birth control/protection: Inserts, Other           Objective   Physical Exam   Constitutional: She is oriented to person, place, and time. She appears well-developed and well-nourished.   HENT:   Head: Normocephalic and atraumatic.   Eyes: Pupils are equal, round, and reactive to light. Conjunctivae and EOM are normal.   Neck: Normal range of motion. Neck supple.   Cardiovascular: Normal rate, regular rhythm, normal heart sounds and intact distal pulses.   Pulmonary/Chest: Effort normal and breath sounds normal. No respiratory distress. She has no wheezes.   Abdominal: Soft. Bowel sounds are normal. She exhibits no distension and no mass. There is no tenderness. There is no rebound and no guarding.   Genitourinary:         Musculoskeletal: Normal range of motion. She exhibits no deformity.   Neurological: She is alert and oriented to person, place, and time. She displays normal reflexes. No cranial nerve deficit or sensory deficit. GCS eye subscore is 4. GCS verbal subscore is 5. GCS motor subscore is 6.   Skin: Skin is warm and dry. Capillary refill takes less than 2 seconds.   Psychiatric: She has a normal mood and affect.   Vitals reviewed.      Procedures           ED Course      /58   Pulse 81   Temp 100.4 °F (38 °C) (Oral)   Resp 16   Ht 157.5 cm (62\")   Wt 83 kg (182 lb 15.7 oz)   LMP 03/02/2020   SpO2 100%   BMI 33.47 kg/m²   Labs Reviewed   COMPREHENSIVE METABOLIC PANEL - Abnormal; Notable for the following components:       Result Value    Glucose 109 (*)     All other components within normal limits    Narrative:     GFR Normal >60  Chronic Kidney Disease <60  Kidney Failure <15     CBC WITH AUTO DIFFERENTIAL - Abnormal; Notable for the following " components:    WBC 13.60 (*)     Neutrophil % 90.0 (*)     Lymphocyte % 4.3 (*)     Monocyte % 4.5 (*)     Neutrophils, Absolute 12.20 (*)     Lymphocytes, Absolute 0.60 (*)     All other components within normal limits   BLOOD CULTURE   BLOOD CULTURE   CBC AND DIFFERENTIAL    Narrative:     The following orders were created for panel order CBC & Differential.  Procedure                               Abnormality         Status                     ---------                               -----------         ------                     CBC Auto Differential[[id number]]        Abnormal            Final result                 Please view results for these tests on the individual orders.     Medications   sodium chloride 0.9 % flush 10 mL (has no administration in time range)   cefTRIAXone (ROCEPHIN) in SWFI 1 gram/10ml IV PUSH syringe (has no administration in time range)   ibuprofen (ADVIL,MOTRIN) tablet 800 mg (800 mg Oral Given 3/2/20 2121)   iopamidol (ISOVUE-370) 76 % injection 100 mL (100 mL Intravenous Given 3/2/20 6269)     Ct Abdomen Pelvis With Contrast    Result Date: 3/2/2020  Impression: 1. Small amount of ill-defined perianal soft tissue thickening is present without fluid to suggest abscess. These changes may be on the basis of proctitis or phlegmon, but perianal scarring related to prior abscess is also a possibility. Correlate with physical exam. 2. Otherwise no acute abnormality seen in the abdomen or pelvis. Electronically signed by:  Abilio Vasquez M.D.  3/2/2020 9:38 PM                                         MDM  Number of Diagnoses or Management Options  Fever and chills:   Perianal pain:   Diagnosis management comments: Patient had IV established and blood work was obtained patient was found to have a temperature of 100.4 and a white count of 13.4 and subsequent CT abdomen pelvis with IV contrast was obtained and patient was not found to have an abscess at this time but did have some soft tissue  thickening consistent with some cellulitis patient will be started on some Bactrim and told to follow-up with primary care for recheck in 3 days she was given IV Rocephin prior to discharge she verbalized understood discharge instructions she will also be given some Tylenol 3 for discomfort her inspect was obtained and reviewed       Amount and/or Complexity of Data Reviewed  Clinical lab tests: reviewed  Tests in the radiology section of CPT®: reviewed    Patient Progress  Patient progress: stable      Final diagnoses:   Fever and chills   Perianal pain            Sofia Coleman, APRN  03/02/20 3352

## 2020-03-03 NOTE — DISCHARGE INSTRUCTIONS
Use antibiotics as directed till gone    Use Tylenol 3 as needed for pain use 1 and if that does not control the pain then add the second 1.    Use Motrin for discomfort as well and additional fever control do not take additional Tylenol with the Tylenol 3    Follow-up with your primary care provider for recheck in 3 days    Return to the ER for increased pain nausea vomiting fever chills or worsening symptoms

## 2020-03-03 NOTE — ED NOTES
Pt c/o rectal itching x1 week and external rectal pain with fever that started this morning.  Pt states pain radiates up her back and she is unable to lie flat on her back.  Pt denies injury or trauma to site.  Pt reports diarrhea that started today.     Smita Szymanski, RN  03/02/20 2117       Smita Szymanski RN  03/02/20 5190

## 2020-03-06 ENCOUNTER — OFFICE VISIT (OUTPATIENT)
Dept: FAMILY MEDICINE CLINIC | Facility: CLINIC | Age: 20
End: 2020-03-06

## 2020-03-06 VITALS
SYSTOLIC BLOOD PRESSURE: 115 MMHG | HEART RATE: 66 BPM | TEMPERATURE: 98.3 F | WEIGHT: 181.8 LBS | DIASTOLIC BLOOD PRESSURE: 72 MMHG | OXYGEN SATURATION: 100 % | RESPIRATION RATE: 16 BRPM | BODY MASS INDEX: 33.45 KG/M2 | HEIGHT: 62 IN

## 2020-03-06 DIAGNOSIS — K62.89 PROCTITIS: Primary | ICD-10-CM

## 2020-03-06 DIAGNOSIS — K62.89 PERIANAL PAIN: ICD-10-CM

## 2020-03-06 PROCEDURE — 99214 OFFICE O/P EST MOD 30 MIN: CPT | Performed by: FAMILY MEDICINE

## 2020-03-06 RX ORDER — ACETAMINOPHEN AND CODEINE PHOSPHATE 300; 30 MG/1; MG/1
2 TABLET ORAL EVERY 6 HOURS PRN
Qty: 20 TABLET | Refills: 0 | Status: SHIPPED | OUTPATIENT
Start: 2020-03-06 | End: 2020-03-06 | Stop reason: SDUPTHER

## 2020-03-06 RX ORDER — ACETAMINOPHEN AND CODEINE PHOSPHATE 300; 30 MG/1; MG/1
2 TABLET ORAL EVERY 6 HOURS PRN
Qty: 20 TABLET | Refills: 0 | Status: SHIPPED | OUTPATIENT
Start: 2020-03-06 | End: 2020-07-01

## 2020-03-06 NOTE — PROGRESS NOTES
Subjective   Jeremiah Cade is a 19 y.o. female.   Chief Complaint   Patient presents with   • Follow-up     [facility] ER follow up for perianal abcess.    • Nausea     Associated with vomiting- vomits at least three times daily; ODT Zofran is not helping.        History of Present Illness   Presents today to follow-up on emergency room visit.  She was at [facility] ER on March 2.  She initially went to the emergency room because she was having rectal pain and a fever.  Apparently in 2018, she had a rather large perirectal abscess that had to be drained.  She was afraid that problem was coming back.  They did a CT scan in the ER.  There was no evidence of an abscess, and it was either related to her prior surgery or possibly mild proctitis.  She denies anal receptive intercourse and cannot think of any reason why she would have an infection in her rectum.  She tells me that she started having profuse sudden diarrhea while in the emergency room.  No stool testing was done.  Blood cultures were obtained.  They were normal.  CBC showed a mildly elevated white count of 13.6.  CMP was also normal.    They told her she had cellulitis and put her on Bactrim and gave her Tylenol 3 for the pain.  She states she had profuse watery diarrhea several more times including an episode so urgent she had to stop and go into the woods off the side of the road.  The diarrhea stopped the next day and has not come back.  She has not had a bowel movement now in the last 4 days.  She had taken the Tylenol 3 fairly regularly the first 2 to 3 days and has started to taper it down since yesterday to as needed.  She states the Tylenol No. 3 has made her easily confused.  She has had daily vomiting since starting the Bactrim DS.  She has no appetite and continues to have rectal pain.      Patient Active Problem List    Diagnosis Date Noted   • Hot flashes 08/14/2019   • Functional diarrhea 07/15/2019   • Depression 07/09/2019   • Anxiety 07/09/2019   •  Insomnia disorder 07/09/2019   • PCOS (polycystic ovarian syndrome) 07/09/2019   • Allergic rhinitis 12/09/2014   • Irregular menses 01/02/2014   • Otalgia 10/22/2012   • Acne vulgaris 08/28/2012           No past surgical history on file.  Current Outpatient Medications on File Prior to Visit   Medication Sig   • NUVARING 0.12-0.015 MG/24HR vaginal ring INSERT 1 RING VAGINALLY AND LEAVE IN PLACE FOR 3 WEEKS, THEN REMOVE FOR 1 RING-FREE WEEK REPEAT   • ondansetron ODT (ZOFRAN-ODT) 4 MG disintegrating tablet Take 1 tablet by mouth 4 (Four) Times a Day As Needed for Nausea.   • sertraline (ZOLOFT) 25 MG tablet Take 1 tablet by mouth Daily.   • [DISCONTINUED] acetaminophen-codeine (TYLENOL #3) 300-30 MG per tablet Take 2 tablets by mouth Every 6 (Six) Hours As Needed for Moderate Pain .   • [DISCONTINUED] sulfamethoxazole-trimethoprim (BACTRIM DS,SEPTRA DS) 800-160 MG per tablet Take 1 tablet by mouth 2 (Two) Times a Day.     No current facility-administered medications on file prior to visit.      No Known Allergies  Social History     Socioeconomic History   • Marital status: Legally      Spouse name: Not on file   • Number of children: Not on file   • Years of education: Not on file   • Highest education level: Not on file   Tobacco Use   • Smoking status: Current Every Day Smoker     Packs/day: 0.00     Years: 0.50     Pack years: 0.00     Types: Electronic Cigarette   • Smokeless tobacco: Never Used   Substance and Sexual Activity   • Alcohol use: No     Frequency: Never   • Drug use: No   • Sexual activity: Yes     Partners: Male     Birth control/protection: Inserts, Other     Family History   Problem Relation Age of Onset   • Hypertension Mother    • Hypertension Father    • Heart attack Father    • Cervical cancer Maternal Grandmother    • Cancer Maternal Grandmother    • Diabetes Paternal Grandfather    • Heart attack Paternal Grandmother 40     The following portions of the patient's history were  "reviewed and updated as appropriate: allergies, current medications, past family history, past medical history, past social history, past surgical history and problem list.    Review of Systems   Constitutional: Positive for appetite change, chills, fatigue and fever.   Respiratory: Negative for shortness of breath.    Cardiovascular: Negative for chest pain.   Gastrointestinal: Positive for nausea, rectal pain and vomiting. Negative for anal bleeding and indigestion.   Genitourinary: Negative for hematuria.   Neurological: Negative for light-headedness and headache.       Objective   /72 (BP Location: Right arm, Patient Position: Sitting, Cuff Size: Adult)   Pulse 66   Temp 98.3 °F (36.8 °C) (Oral)   Resp 16   Ht 157.5 cm (62\")   Wt 82.5 kg (181 lb 12.8 oz)   LMP 03/02/2020   SpO2 100%   BMI 33.25 kg/m²   Physical Exam   Constitutional: She is oriented to person, place, and time. She appears well-developed and well-nourished.   HENT:   Head: Normocephalic and atraumatic.   Eyes: Conjunctivae and EOM are normal.   Neck: Normal range of motion.   Cardiovascular: Normal rate.   Pulmonary/Chest: Effort normal.   Genitourinary:   Genitourinary Comments: Visual exam of the perianal area -no erythema.  No areas of fluctuation or induration.  No cellulitis.   Musculoskeletal: Normal range of motion.   Neurological: She is alert and oriented to person, place, and time.   Skin: Skin is warm and dry. No rash noted.   Psychiatric: She has a normal mood and affect. Her behavior is normal.       Assessment/Plan   Diagnoses and all orders for this visit:    1. Proctitis (Primary)    2. Perianal pain  -     Discontinue: acetaminophen-codeine (TYLENOL #3) 300-30 MG per tablet; Take 2 tablets by mouth Every 6 (Six) Hours As Needed for Moderate Pain .  Dispense: 20 tablet; Refill: 0  -     acetaminophen-codeine (TYLENOL #3) 300-30 MG per tablet; Take 2 tablets by mouth Every 6 (Six) Hours As Needed for Moderate Pain .  " Dispense: 20 tablet; Refill: 0        There is no evidence of an abscess or cellulitis on her CT scan.  There is no visible evidence of cellulitis on physical exam today.  Stop the Bactrim as I suspect it is causing her nausea and vomiting.    I suspect the fever and low pelvic pain were initial signs of a gastroenteritis or colitis.  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  The CT findings suggest inflammation of the distal colon.  This could be causing the discomfort.  She is no longer having a fever, but feels like she could get 1.  I suspect she is now constipated from the codeine and the effects of the profuse diarrhea.  I have recommended that she try to taper down off of the Tylenol No. 3 and start taking milk of magnesia 30 mL's every 6 hours until her bowels start to move again.  If she has a recurrence of diarrhea over the weekend, she should go to the emergency room for testing.  Hopefully when her bowels start to move again the pain will be better and she can stop the Tylenol with codeine.  I asked her to call early next week and give me an update on how she is doing.  If the diarrhea returns, she develops fevers, will definitely collect a stool sample and get an IBD panel and consider repeating a CT or GI referral.         Return if symptoms worsen or fail to improve.    Call with any problems or concerns before next visit

## 2020-03-07 LAB
BACTERIA SPEC AEROBE CULT: NORMAL
BACTERIA SPEC AEROBE CULT: NORMAL

## 2020-03-13 ENCOUNTER — RESULTS ENCOUNTER (OUTPATIENT)
Dept: FAMILY MEDICINE CLINIC | Facility: CLINIC | Age: 20
End: 2020-03-13

## 2020-03-13 ENCOUNTER — TELEPHONE (OUTPATIENT)
Dept: FAMILY MEDICINE CLINIC | Facility: CLINIC | Age: 20
End: 2020-03-13

## 2020-03-13 DIAGNOSIS — R19.7 DIARRHEA, UNSPECIFIED TYPE: ICD-10-CM

## 2020-03-13 DIAGNOSIS — R19.7 DIARRHEA, UNSPECIFIED TYPE: Primary | ICD-10-CM

## 2020-03-13 DIAGNOSIS — K62.89 PROCTITIS: ICD-10-CM

## 2020-03-13 NOTE — TELEPHONE ENCOUNTER
Since this has not gone away, it is time to do a work-up.  I placed orders for stool sample and blood work.  Ask her to stop in and get the collection materials for the stool sample and collect the sample late in the weekend and bring it in Monday.  She can get her blood drawn then.  Thanks

## 2020-03-13 NOTE — TELEPHONE ENCOUNTER
Patient returned phone call to office at this time. Advised her of the above. She voiced understanding. [place] and will try to find someone to come up and  her stool collection supplies. Advised her to call the office back and let us know who will be picking it up for her. She voiced understanding and appreciation.

## 2020-03-13 NOTE — TELEPHONE ENCOUNTER
Called patient. No answer. Per HIPAA on file, cannot leave a detailed message. VM left advising patient to contact office.

## 2020-03-13 NOTE — TELEPHONE ENCOUNTER
Patient came into office and had a same day appointment due to c/o mucus in her stools. However, she had to cancel due to her significant other's grandfather passing away. She then came into the office with a short clip of what the mucus looks like. It is light yellow and according to patient dark yellow some days and has been going on for the past 2-3 days. C/o a cold sweat and then getting hot. Denies the mucus being from the abscess. States that Bactrim was stopped due to GI side effects. Please advise. Thanks.

## 2020-03-18 LAB
ALBUMIN SERPL-MCNC: 4.2 G/DL (ref 3.9–5)
ALBUMIN/GLOB SERPL: 1.6 {RATIO} (ref 1.2–2.2)
ALP SERPL-CCNC: 80 IU/L (ref 39–117)
ALT SERPL-CCNC: 19 IU/L (ref 0–32)
AST SERPL-CCNC: 21 IU/L (ref 0–40)
BAKER'S YEAST IGA QN: <20 UNITS (ref 0–24.9)
BAKER'S YEAST IGG QN: <20 UNITS (ref 0–24.9)
BASOPHILS # BLD AUTO: 0 X10E3/UL (ref 0–0.2)
BASOPHILS NFR BLD AUTO: 1 %
BILIRUB SERPL-MCNC: 0.3 MG/DL (ref 0–1.2)
BUN SERPL-MCNC: 5 MG/DL (ref 6–20)
BUN/CREAT SERPL: 9 (ref 9–23)
CALCIUM SERPL-MCNC: 9.5 MG/DL (ref 8.7–10.2)
CHLORIDE SERPL-SCNC: 101 MMOL/L (ref 96–106)
CO2 SERPL-SCNC: 24 MMOL/L (ref 20–29)
CREAT SERPL-MCNC: 0.58 MG/DL (ref 0.57–1)
EOSINOPHIL # BLD AUTO: 0.2 X10E3/UL (ref 0–0.4)
EOSINOPHIL NFR BLD AUTO: 3 %
ERYTHROCYTE [DISTWIDTH] IN BLOOD BY AUTOMATED COUNT: 12.4 % (ref 11.7–15.4)
GLOBULIN SER CALC-MCNC: 2.7 G/DL (ref 1.5–4.5)
GLUCOSE SERPL-MCNC: 85 MG/DL (ref 65–99)
HCT VFR BLD AUTO: 38.2 % (ref 34–46.6)
HGB BLD-MCNC: 12.8 G/DL (ref 11.1–15.9)
IMM GRANULOCYTES # BLD AUTO: 0 X10E3/UL (ref 0–0.1)
IMM GRANULOCYTES NFR BLD AUTO: 0 %
LYMPHOCYTES # BLD AUTO: 1.6 X10E3/UL (ref 0.7–3.1)
LYMPHOCYTES NFR BLD AUTO: 25 %
MCH RBC QN AUTO: 29.1 PG (ref 26.6–33)
MCHC RBC AUTO-ENTMCNC: 33.5 G/DL (ref 31.5–35.7)
MCV RBC AUTO: 87 FL (ref 79–97)
MONOCYTES # BLD AUTO: 0.4 X10E3/UL (ref 0.1–0.9)
MONOCYTES NFR BLD AUTO: 6 %
NEUTROPHILS # BLD AUTO: 4.2 X10E3/UL (ref 1.4–7)
NEUTROPHILS NFR BLD AUTO: 65 %
P-ANCA ATYPICAL TITR SER IF: NORMAL TITER
PLATELET # BLD AUTO: 267 X10E3/UL (ref 150–450)
POTASSIUM SERPL-SCNC: 4.1 MMOL/L (ref 3.5–5.2)
PROT SERPL-MCNC: 6.9 G/DL (ref 6–8.5)
RBC # BLD AUTO: 4.4 X10E6/UL (ref 3.77–5.28)
SODIUM SERPL-SCNC: 142 MMOL/L (ref 134–144)
WBC # BLD AUTO: 6.3 X10E3/UL (ref 3.4–10.8)

## 2020-03-20 ENCOUNTER — TELEPHONE (OUTPATIENT)
Dept: FAMILY MEDICINE CLINIC | Facility: CLINIC | Age: 20
End: 2020-03-20

## 2020-03-20 DIAGNOSIS — A04.72 C. DIFFICILE DIARRHEA: Primary | ICD-10-CM

## 2020-03-20 LAB
BACTERIA SPEC CULT: NORMAL
BACTERIA SPEC CULT: NORMAL
C DIFF TOX GENS STL QL NAA+PROBE: POSITIVE
CAMPYLOBACTER STL CULT: NORMAL
CRYPTOSP AG STL QL IA: NEGATIVE
E COLI SXT STL QL IA: NEGATIVE
FAT STL QL: NORMAL
G LAMBLIA AG STL QL IA: NEGATIVE
LACTOFERRIN STL-MCNC: 6.53 UG/ML(G) (ref 0–7.24)
MEAT FIBERS STL QL MICRO: NORMAL
NEUTRAL FAT STL QL: NORMAL
O+P SPEC MICRO: NORMAL
O+P STL CONC: NORMAL
SALM + SHIG STL CULT: NORMAL

## 2020-03-20 RX ORDER — VANCOMYCIN HYDROCHLORIDE 125 MG/1
125 CAPSULE ORAL 4 TIMES DAILY
Qty: 40 CAPSULE | Refills: 0 | Status: SHIPPED | OUTPATIENT
Start: 2020-03-20 | End: 2020-03-30

## 2020-03-20 RX ORDER — SACCHAROMYCES BOULARDII 250 MG
250 CAPSULE ORAL 2 TIMES DAILY
Qty: 28 CAPSULE | Refills: 0 | Status: SHIPPED | OUTPATIENT
Start: 2020-03-20 | End: 2020-04-03

## 2020-03-20 NOTE — TELEPHONE ENCOUNTER
----- Message from She Pickett MD sent at 3/20/2020 10:54 AM EDT -----  Please tell Jeremiah that her stool test came back positive for C. difficile.  This explains the findings on the CT as well as her ongoing symptoms.  Continue good hand hygiene.  Consider herself contagious.  If any family members develop diarrhea, they should contact their doctor as soon as possible to be tested.  I am going to call in a probiotic x 2 weeks and vancomycin 125 mg capsules 4 times daily for 10 days.  Make sure to keep fluid intake up.  Let us know if she is not doing better by early next week.  Thanks.

## 2020-03-20 NOTE — TELEPHONE ENCOUNTER
Called patient. Patient's identity verified. Advised her of lab results. She voiced understanding.

## 2020-03-20 NOTE — TELEPHONE ENCOUNTER
Called patient. Patient's identity verified. Advised her of stool results. She voiced understanding. She was also encouraged to get in a good routine of hand hygiene and to also use bleach wipes on toilet seats to prevent further contamination to other's that she lives with. She will call us with any questions/concerns.

## 2020-03-20 NOTE — TELEPHONE ENCOUNTER
----- Message from She Pickett MD sent at 3/19/2020  3:52 PM EDT -----  Please let Jeremiah know that her blood tests were all normal.  Specifically the tests for inflammatory bowel disease, like Crohn's disease, were normal.  I am awaiting the results of the stool testing and we will follow-up with her as soon as that is back.  Thanks

## 2020-03-30 ENCOUNTER — PATIENT MESSAGE (OUTPATIENT)
Dept: FAMILY MEDICINE CLINIC | Facility: CLINIC | Age: 20
End: 2020-03-30

## 2020-03-30 ENCOUNTER — TELEPHONE (OUTPATIENT)
Dept: FAMILY MEDICINE CLINIC | Facility: CLINIC | Age: 20
End: 2020-03-30

## 2020-03-30 DIAGNOSIS — A04.72 C. DIFFICILE DIARRHEA: Primary | ICD-10-CM

## 2020-03-30 NOTE — TELEPHONE ENCOUNTER
----- Message from Angela Lee MA sent at 3/30/2020  9:20 AM EDT -----  Regarding: FW: Prescription Question      ----- Message -----  From: Jeremiah Cade  Sent: 3/30/2020   9:13 AM EDT  To: Austin Ríos [facility]  Subject: Prescription Question                            Hi, the vancomycin I was prescribed for my CDIFF doesn’t seem to be working. Through the beginning of last week I was having a lot more formed stool and it got looser as the week progressed. Over the weekend it has returned to watery diarrhea. Color and texture has not changed from before.

## 2020-03-30 NOTE — TELEPHONE ENCOUNTER
I think we should call this an early relapse or failure of vancomycin.  I'd like to change to aggressive treatment.  I'll send in an order for Dificid 200mg BID also for 10 days.

## 2020-03-31 ENCOUNTER — TELEPHONE (OUTPATIENT)
Dept: FAMILY MEDICINE CLINIC | Facility: CLINIC | Age: 20
End: 2020-03-31

## 2020-03-31 DIAGNOSIS — A04.72 C. DIFFICILE DIARRHEA: Primary | ICD-10-CM

## 2020-03-31 DIAGNOSIS — A04.72 COLITIS DUE TO CLOSTRIDIUM DIFFICILE: ICD-10-CM

## 2020-03-31 RX ORDER — METRONIDAZOLE 500 MG/1
500 TABLET ORAL 3 TIMES DAILY
Qty: 30 TABLET | Refills: 0 | Status: SHIPPED | OUTPATIENT
Start: 2020-03-31 | End: 2020-04-10

## 2020-03-31 NOTE — TELEPHONE ENCOUNTER
PA for patient's Dificid denied. States that patient must try Azithromycin prior to this being approved or she has tried and failed two PDL agents. Please advise. Thank you.

## 2020-03-31 NOTE — TELEPHONE ENCOUNTER
Please tell Jeremiah that her insurance would not cover the Dificid.  We are going to you change to Flagyl 1 pill 3 times daily for 10 days.  If this does not get her diarrhea to slow down or hopefully stop within the next 5 to 6 days, let me know and we will fight for the other treatment with her insurance again.  Thanks

## 2020-04-20 ENCOUNTER — TELEPHONE (OUTPATIENT)
Dept: FAMILY MEDICINE CLINIC | Facility: CLINIC | Age: 20
End: 2020-04-20

## 2020-04-20 DIAGNOSIS — A04.72 COLITIS DUE TO CLOSTRIDIUM DIFFICILE: Primary | ICD-10-CM

## 2020-04-20 NOTE — TELEPHONE ENCOUNTER
----- Message from Bela Vera sent at 4/20/2020 10:37 AM EDT -----  Regarding: FW: Visit Follow-Up Question      ----- Message -----  From: Jeremiah Cade  Sent: 4/20/2020  10:23 AM EDT  To: Austin Ríos [facility]  Subject: Visit Follow-Up Question                         Hi, I have a few concerns following my flagyl treatment for my CDIFF. My stool hasn’t changed characteristically. Smell, texture, and color remain the same as it was before treatment. Diarrhea has stopped, but my stool is still very soft. I’m ranging 4-6 bowel movements per day since completing flagyl. Am I doing something that could be causing relapse of the cdiff? I have taken the necessary precautions to sanitize all surfaces to prevent the spread to other individuals and help myself heal but I’m kind of puzzled as to why this isn’t going away for me. Flagyl definitely helped, it just don’t think it knocked it out like it was meant to. 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

## 2020-04-20 NOTE — TELEPHONE ENCOUNTER
Please tell Jeremiah that no, I do not think she has done anything to cause a relapse.  Is she is still taking a probiotic?  If not, please restart an over-the-counter probiotic, or I can call in more Florastor.      I would also like to get another stool sample.  I will put in an order.  If she can stop by and  the collection materials and return it to us we will run the test.  If the sample is still positive, it may mean that it has reduced the C. Diff burden, but not eliminated it.  I'll follow up with her when the test results are available.  Thanks

## 2020-04-23 NOTE — TELEPHONE ENCOUNTER
Called patient to advise her to hold medication. No answer. Dr. Nowak, please advise. Thank you.    23

## 2020-04-24 LAB — C DIFF TOX GENS STL QL NAA+PROBE: POSITIVE

## 2020-06-24 ENCOUNTER — TELEPHONE (OUTPATIENT)
Dept: FAMILY MEDICINE CLINIC | Facility: CLINIC | Age: 20
End: 2020-06-24

## 2020-06-24 DIAGNOSIS — A04.72 COLITIS DUE TO CLOSTRIDIUM DIFFICILE: Primary | ICD-10-CM

## 2020-06-24 NOTE — TELEPHONE ENCOUNTER
I have placed orders for C. difficile testing of his stool sample.  Please arrange for her to come in and get the collection supplies.  I would like to see her next week.  Hopefully we will have the testing back by then.   please see if we can get her scheduled.  Thanks

## 2020-06-24 NOTE — TELEPHONE ENCOUNTER
Will you please print a note on her letterhead as follows:    June 24, 2020    To whom it may concern,       I am writing this letter on behalf of my patient Jeremiah Cade, date of birth 2000.  Jeremiah has a potentially contagious medical problem and I am recommending she remain at home until confirmatory testing is available.  She will be notified of the results of this test and decision will be made about that time about return to work date.        Sincerely,    She Pickett MD

## 2020-06-24 NOTE — TELEPHONE ENCOUNTER
Patient calling stating that she would like another cdiff test, she thinks it is back. She states having symptoms again.Loose stools, smells bad, yellow and mucousy. Please advise.

## 2020-06-24 NOTE — TELEPHONE ENCOUNTER
Patient said she needs a work note, they wont let her come back to work until her test comes back wanting to know if we can fax to her work at [employer]

## 2020-07-01 ENCOUNTER — OFFICE VISIT (OUTPATIENT)
Dept: FAMILY MEDICINE CLINIC | Facility: CLINIC | Age: 20
End: 2020-07-01

## 2020-07-01 VITALS
OXYGEN SATURATION: 99 % | DIASTOLIC BLOOD PRESSURE: 72 MMHG | SYSTOLIC BLOOD PRESSURE: 113 MMHG | HEIGHT: 62 IN | BODY MASS INDEX: 33.49 KG/M2 | WEIGHT: 182 LBS | HEART RATE: 68 BPM | TEMPERATURE: 97.2 F

## 2020-07-01 DIAGNOSIS — K52.9 CHRONIC DIARRHEA OF UNKNOWN ORIGIN: Primary | ICD-10-CM

## 2020-07-01 LAB
BACTERIA SPEC CULT: NORMAL
BACTERIA SPEC CULT: NORMAL
C DIFF TOX GENS STL QL NAA+PROBE: NEGATIVE
CAMPYLOBACTER STL CULT: NORMAL
E COLI SXT STL QL IA: NEGATIVE
SALM + SHIG STL CULT: NORMAL

## 2020-07-01 PROCEDURE — 99213 OFFICE O/P EST LOW 20 MIN: CPT | Performed by: FAMILY MEDICINE

## 2020-07-01 NOTE — PROGRESS NOTES
Subjective   Jeremiah Cade is a 20 y.o. female.   Chief Complaint   Patient presents with   • Abdominal Pain   • Diarrhea       History of Present Illness Pt comes into the office today complaining of Abd Pain and Yellow Stools that are soft.  SG    Presents today for recheck on diarrhea.  She had laboratory confirmed C. difficile colitis back in mid March.  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  She failed both vancomycin and Flagyl.  Ultimately we were able to give her a course of Dificid.  During the course of her C. difficile infection, she had a CT scan of her abdomen and pelvis that showed distal nonspecific colitis.  After Dificid, stool pattern improved and was semisolid.    She did not have any further abdominal pain.      She called back in 1 week ago reporting that her stool had once again become loose, malodorous, yellow and mucousy.  C. difficile testing was repeated on 6/26 and was resulted on 7/1 and it was negative.  Stool culture was also negative.  She has general abdominal cramping.  She is not running any fevers.        Patient Active Problem List    Diagnosis Date Noted   • Hot flashes 08/14/2019   • Functional diarrhea 07/15/2019   • Depression 07/09/2019   • Anxiety 07/09/2019   • Insomnia disorder 07/09/2019   • PCOS (polycystic ovarian syndrome) 07/09/2019   • Allergic rhinitis 12/09/2014   • Irregular menses 01/02/2014   • Otalgia 10/22/2012   • Acne vulgaris 08/28/2012           History reviewed. No pertinent surgical history.  Current Outpatient Medications on File Prior to Visit   Medication Sig   • sertraline (ZOLOFT) 25 MG tablet Take 1 tablet by mouth Daily.     No current facility-administered medications on file prior to visit.      No Known Allergies  Social History     Socioeconomic History   • Marital status: Legally      Spouse name: Not on file   • Number of children: Not on file   • Years of education: Not on file   •  "Highest education level: Not on file   Tobacco Use   • Smoking status: Current Every Day Smoker     Packs/day: 0.00     Years: 0.50     Pack years: 0.00     Types: Electronic Cigarette   • Smokeless tobacco: Never Used   Substance and Sexual Activity   • Alcohol use: No     Frequency: Never   • Drug use: No   • Sexual activity: Yes     Partners: Male     Birth control/protection: Inserts, Other     Family History   Problem Relation Age of Onset   • Hypertension Mother    • Hypertension Father    • Heart attack Father    • Cervical cancer Maternal Grandmother    • Cancer Maternal Grandmother    • Diabetes Paternal Grandfather    • Heart attack Paternal Grandmother 40     The following portions of the patient's history were reviewed and updated as appropriate: allergies, current medications, past family history, past medical history, past social history, past surgical history and problem list.    Review of Systems   Constitutional: Negative for chills and fever.   Respiratory: Negative for cough and shortness of breath.    Cardiovascular: Negative for chest pain and leg swelling.       Objective   /72 (BP Location: Right arm, Patient Position: Sitting, Cuff Size: Adult)   Pulse 68   Temp 97.2 °F (36.2 °C) (Temporal)   Ht 157.5 cm (62\")   Wt 82.6 kg (182 lb)   SpO2 99%   Breastfeeding No   BMI 33.29 kg/m²   Physical Exam   Constitutional: She is oriented to person, place, and time. She appears well-developed and well-nourished.   She wears a mask throughout the visit   HENT:   Head: Normocephalic and atraumatic.   Eyes: Conjunctivae and EOM are normal.   Neck: Normal range of motion.   Cardiovascular: Normal rate.   Pulmonary/Chest: Effort normal.   Abdominal: Soft. Bowel sounds are normal. She exhibits no distension and no mass. There is no tenderness. There is no rebound and no guarding.   Musculoskeletal: Normal range of motion.   Neurological: She is alert and oriented to person, place, and time. "   Skin: Skin is warm and dry. No rash noted.   Psychiatric: She has a normal mood and affect. Her behavior is normal.         Telephone on 06/24/2020   Component Date Value Ref Range Status   • Salmonella/Shigella Screen 06/26/2020 Final report   Final   • Result 1 06/26/2020 Comment   Final    No Salmonella or Shigella recovered.   • Campylobacter Culture 06/26/2020 Final report   Final   • Result 1 06/26/2020 Comment   Final    No Campylobacter species isolated.   • E coli, Shiga toxin Assay 06/26/2020 Negative  Negative Final   • C difficile Toxin Gene NAAT 06/26/2020 Negative  Negative Final   Orders Only on 04/20/2020   Component Date Value Ref Range Status   • C difficile Toxin Gene NAAT 04/20/2020 Positive* Negative Final    Comment: Toxigenic C difficile: Positive  Epidemic Strain Bl/NAP1/027: Presumptive Negative     Orders Only on 03/16/2020   Component Date Value Ref Range Status   • Salmonella/Shigella Screen 03/16/2020 Final report   Final   • Result 1 03/16/2020 Comment   Final    No Salmonella or Shigella recovered.   • Campylobacter Culture 03/16/2020 Final report   Final   • Result 1 03/16/2020 Comment   Final    No Campylobacter species isolated.   • E coli, Shiga toxin Assay 03/16/2020 Negative  Negative Final   • Fecal Fat Qualitative, Neutral Fats 03/16/2020 Normal   Final                                   Normal (<60 Droplets/HPF)   • Fecal Fats, Qualititative, Total 03/16/2020 Normal   Final                                  Normal (<100 Droplets/HPF)   • Muscle Fiber, Stool 03/16/2020 Normal   Final                                     Normal (<10 Fibers/HPF)   • Giardia Ag, Stl 03/16/2020 Negative  Negative Final   • Cryptosporidium Antigen 03/16/2020 Negative  Negative Final   • Lactoferrin, Quant 03/16/2020 6.53  0.00 - 7.24 ug/mL(g) Final    Comment:                         Baseline (normal)  0.00 - 7.24                          Elevated                 >7.24  An elevated result is  indicative of the presence of  fecal lactoferrin, a marker of intestinal inflammation.  A normal result does not exclude the presence of  intestinal inflammation.  The test can be used as an in vitro diagnostic aid to  distinguish patients with active inflammatory bowel  disease (IBD) from those with non-inflammatory  irritable bowel syndrome (IBS).     • C difficile Toxin Gene NAAT 03/16/2020 Positive* Negative Final    Comment: Toxigenic C difficile: Positive  Epidemic Strain Bl/NAP1/027: Presumptive Negative     • Ova + Parasite Exam 03/16/2020 Final report   Final    Comment: These results were obtained using wet preparation(s) and trichrome  stained smear. This test does not include testing for Cryptosporidium  parvum, Cyclospora, or Microsporidia.     • Ova + Parasite Result 1 03/16/2020 Comment   Final    Comment: No ova, cysts, or parasites seen.  One negative specimen does not rule out the possibility of a  parasitic infection.     Results Encounter on 03/13/2020   Component Date Value Ref Range Status   • Saccharomyces cerevisiae Ab IgG 03/16/2020 <20.0  0.0 - 24.9 Units Final    Comment:                                 Negative         <20.0                                  Equivocal  20.1 - 24.9                                  Positive     >or= 25.0     • Saccharomyces cerevisiae Ab IgA 03/16/2020 <20.0  0.0 - 24.9 Units Final    Comment:                                  Negative        <20.0                                   Equivocal 20.1 - 24.9                                   Positive    >or= 25.0  IgA and IgG antibody testing for S. cerevisiae is  useful adjunct testing for differentiating Crohn's  disease and ulcerative colitis. Close to 80% of  Crohn's disease patients are positive for either  IgA or IgG. In ulcerative colitis, less than 15% are  positive for IgG and less than 2% are positive for  IgA. Fewer than 5% are positive for either IgG or  IgA antibody, and no healthy controls had  antibody  for both.     • Atypical pANCA 03/16/2020 <1:20  Neg:<1:20 titer Final    Comment: The atypical pANCA pattern has been observed in a significant  percentage of patients with ulcerative colitis, primary sclerosing  cholangitis and autoimmune hepatitis.            ASCA+/PANCA- Suggestive of Crohn's disease            ASCA-/PANCA+ Suggestive of Ulcerative colitis     • Glucose 03/16/2020 85  65 - 99 mg/dL Final   • BUN 03/16/2020 5* 6 - 20 mg/dL Final   • Creatinine 03/16/2020 0.58  0.57 - 1.00 mg/dL Final   • eGFR Non African Am 03/16/2020 132  >59 mL/min/1.73 Final   • eGFR African Am 03/16/2020 152  >59 mL/min/1.73 Final   • BUN/Creatinine Ratio 03/16/2020 9  9 - 23 Final   • Sodium 03/16/2020 142  134 - 144 mmol/L Final   • Potassium 03/16/2020 4.1  3.5 - 5.2 mmol/L Final   • Chloride 03/16/2020 101  96 - 106 mmol/L Final   • Total CO2 03/16/2020 24  20 - 29 mmol/L Final   • Calcium 03/16/2020 9.5  8.7 - 10.2 mg/dL Final   • Total Protein 03/16/2020 6.9  6.0 - 8.5 g/dL Final   • Albumin 03/16/2020 4.2  3.9 - 5.0 g/dL Final   • Globulin 03/16/2020 2.7  1.5 - 4.5 g/dL Final   • A/G Ratio 03/16/2020 1.6  1.2 - 2.2 Final   • Total Bilirubin 03/16/2020 0.3  0.0 - 1.2 mg/dL Final   • Alkaline Phosphatase 03/16/2020 80  39 - 117 IU/L Final   • AST (SGOT) 03/16/2020 21  0 - 40 IU/L Final   • ALT (SGPT) 03/16/2020 19  0 - 32 IU/L Final   • WBC 03/16/2020 6.3  3.4 - 10.8 x10E3/uL Final   • RBC 03/16/2020 4.40  3.77 - 5.28 x10E6/uL Final   • Hemoglobin 03/16/2020 12.8  11.1 - 15.9 g/dL Final   • Hematocrit 03/16/2020 38.2  34.0 - 46.6 % Final   • MCV 03/16/2020 87  79 - 97 fL Final   • MCH 03/16/2020 29.1  26.6 - 33.0 pg Final   • MCHC 03/16/2020 33.5  31.5 - 35.7 g/dL Final   • RDW 03/16/2020 12.4  11.7 - 15.4 % Final   • Platelets 03/16/2020 267  150 - 450 x10E3/uL Final   • Neutrophil Rel % 03/16/2020 65  Not Estab. % Final   • Lymphocyte Rel % 03/16/2020 25  Not Estab. % Final   • Monocyte Rel % 03/16/2020 6   Not Estab. % Final   • Eosinophil Rel % 03/16/2020 3  Not Estab. % Final   • Basophil Rel % 03/16/2020 1  Not Estab. % Final   • Neutrophils Absolute 03/16/2020 4.2  1.4 - 7.0 x10E3/uL Final   • Lymphocytes Absolute 03/16/2020 1.6  0.7 - 3.1 x10E3/uL Final   • Monocytes Absolute 03/16/2020 0.4  0.1 - 0.9 x10E3/uL Final   • Eosinophils Absolute 03/16/2020 0.2  0.0 - 0.4 x10E3/uL Final   • Basophils Absolute 03/16/2020 0.0  0.0 - 0.2 x10E3/uL Final   • Immature Granulocyte Rel % 03/16/2020 0  Not Estab. % Final   • Immature Grans Absolute 03/16/2020 0.0  0.0 - 0.1 x10E3/uL Final   Admission on 03/02/2020, Discharged on 03/03/2020   Component Date Value Ref Range Status   • Glucose 03/02/2020 109* 65 - 99 mg/dL Final   • BUN 03/02/2020 6  6 - 20 mg/dL Final   • Creatinine 03/02/2020 0.64  0.57 - 1.00 mg/dL Final   • Sodium 03/02/2020 138  136 - 145 mmol/L Final   • Potassium 03/02/2020 3.8  3.5 - 5.2 mmol/L Final   • Chloride 03/02/2020 100  98 - 107 mmol/L Final   • CO2 03/02/2020 25.0  22.0 - 29.0 mmol/L Final   • Calcium 03/02/2020 9.3  8.6 - 10.5 mg/dL Final   • Total Protein 03/02/2020 7.5  6.0 - 8.5 g/dL Final   • Albumin 03/02/2020 4.20  3.50 - 5.20 g/dL Final   • ALT (SGPT) 03/02/2020 22  1 - 33 U/L Final   • AST (SGOT) 03/02/2020 16  1 - 32 U/L Final   • Alkaline Phosphatase 03/02/2020 85  39 - 117 U/L Final   • Total Bilirubin 03/02/2020 0.3  0.2 - 1.2 mg/dL Final   • eGFR Non African Amer 03/02/2020 117  >60 mL/min/1.73 Final   • Globulin 03/02/2020 3.3  gm/dL Final   • A/G Ratio 03/02/2020 1.3  g/dL Final   • BUN/Creatinine Ratio 03/02/2020 9.4  7.0 - 25.0 Final   • Anion Gap 03/02/2020 13.0  5.0 - 15.0 mmol/L Final   • Blood Culture 03/02/2020 No growth at 5 days   Final   • Blood Culture 03/02/2020 No growth at 5 days   Final   • WBC 03/02/2020 13.60* 3.40 - 10.80 10*3/mm3 Final   • RBC 03/02/2020 4.68  3.77 - 5.28 10*6/mm3 Final   • Hemoglobin 03/02/2020 13.6  12.0 - 15.9 g/dL Final   • Hematocrit  03/02/2020 41.0  34.0 - 46.6 % Final   • MCV 03/02/2020 87.7  79.0 - 97.0 fL Final   • MCH 03/02/2020 29.0  26.6 - 33.0 pg Final   • MCHC 03/02/2020 33.1  31.5 - 35.7 g/dL Final   • RDW 03/02/2020 12.6  12.3 - 15.4 % Final   • RDW-SD 03/02/2020 38.9  37.0 - 54.0 fl Final   • MPV 03/02/2020 10.0  6.0 - 12.0 fL Final   • Platelets 03/02/2020 190  140 - 450 10*3/mm3 Final   • Neutrophil % 03/02/2020 90.0* 42.7 - 76.0 % Final   • Lymphocyte % 03/02/2020 4.3* 19.6 - 45.3 % Final   • Monocyte % 03/02/2020 4.5* 5.0 - 12.0 % Final   • Eosinophil % 03/02/2020 0.5  0.3 - 6.2 % Final   • Basophil % 03/02/2020 0.7  0.0 - 1.5 % Final   • Neutrophils, Absolute 03/02/2020 12.20* 1.70 - 7.00 10*3/mm3 Final   • Lymphocytes, Absolute 03/02/2020 0.60* 0.70 - 3.10 10*3/mm3 Final   • Monocytes, Absolute 03/02/2020 0.60  0.10 - 0.90 10*3/mm3 Final   • Eosinophils, Absolute 03/02/2020 0.10  0.00 - 0.40 10*3/mm3 Final   • Basophils, Absolute 03/02/2020 0.10  0.00 - 0.20 10*3/mm3 Final   • nRBC 03/02/2020 0.0  0.0 - 0.2 /100 WBC Final         Assessment/Plan   Diagnoses and all orders for this visit:    1. Chronic diarrhea of unknown origin (Primary)  -     Ambulatory Referral to Gastroenterology    Initially, we suspected recurrent C. difficile colitis.  Her second test at this time was negative.  I do wonder given the fact she has had colitis on the CT scan if she might have some type of inflammatory bowel disease.  During our original work-up she did have a fecal lactoferrin test that was high normal.  I think she should see a gastroenterologist for further work-up to rule out inflammatory bowel disease.  She asks that I write a note for her indicating we do not have any current evidence of active C. difficile infection so that she can go back to work.  I did write her that note.  Recommend that she continue to take the OTC probiotic that she is on.              Return if symptoms worsen or fail to improve.    Call with any problems or  concerns before next visit  Answers for HPI/ROS submitted by the patient on 7/1/2020   What is the primary reason for your visit?: Other  Please describe your symptoms.: Characteristic change in stool.  Have you had these symptoms before?: Yes  How long have you been having these symptoms?: 1-2 weeks

## 2020-07-21 ENCOUNTER — OFFICE (AMBULATORY)
Dept: URBAN - METROPOLITAN AREA CLINIC 64 | Facility: CLINIC | Age: 20
End: 2020-07-21
Payer: COMMERCIAL

## 2020-07-21 VITALS
DIASTOLIC BLOOD PRESSURE: 82 MMHG | HEIGHT: 62 IN | WEIGHT: 186 LBS | SYSTOLIC BLOOD PRESSURE: 139 MMHG | HEART RATE: 69 BPM

## 2020-07-21 DIAGNOSIS — R19.7 DIARRHEA, UNSPECIFIED: ICD-10-CM

## 2020-07-21 DIAGNOSIS — R14.0 ABDOMINAL DISTENSION (GASEOUS): ICD-10-CM

## 2020-07-21 DIAGNOSIS — R10.30 LOWER ABDOMINAL PAIN, UNSPECIFIED: ICD-10-CM

## 2020-07-21 DIAGNOSIS — R19.4 CHANGE IN BOWEL HABIT: ICD-10-CM

## 2020-07-21 PROCEDURE — 99203 OFFICE O/P NEW LOW 30 MIN: CPT | Performed by: NURSE PRACTITIONER

## 2020-07-21 RX ORDER — HYOSCYAMINE SULFATE 0.12 MG/1
0.5 TABLET ORAL; SUBLINGUAL
Qty: 60 | Refills: 5 | Status: COMPLETED
Start: 2020-07-21 | End: 2021-04-21

## 2020-07-29 ENCOUNTER — OFFICE (AMBULATORY)
Dept: URBAN - METROPOLITAN AREA PATHOLOGY 4 | Facility: PATHOLOGY | Age: 20
End: 2020-07-29
Payer: COMMERCIAL

## 2020-07-29 ENCOUNTER — ON CAMPUS - OUTPATIENT (AMBULATORY)
Dept: URBAN - METROPOLITAN AREA HOSPITAL 2 | Facility: HOSPITAL | Age: 20
End: 2020-07-29
Payer: COMMERCIAL

## 2020-07-29 VITALS
DIASTOLIC BLOOD PRESSURE: 60 MMHG | SYSTOLIC BLOOD PRESSURE: 128 MMHG | WEIGHT: 183 LBS | SYSTOLIC BLOOD PRESSURE: 145 MMHG | DIASTOLIC BLOOD PRESSURE: 70 MMHG | DIASTOLIC BLOOD PRESSURE: 59 MMHG | DIASTOLIC BLOOD PRESSURE: 79 MMHG | TEMPERATURE: 98 F | HEART RATE: 68 BPM | SYSTOLIC BLOOD PRESSURE: 102 MMHG | DIASTOLIC BLOOD PRESSURE: 68 MMHG | RESPIRATION RATE: 17 BRPM | OXYGEN SATURATION: 100 % | HEART RATE: 61 BPM | OXYGEN SATURATION: 99 % | SYSTOLIC BLOOD PRESSURE: 125 MMHG | SYSTOLIC BLOOD PRESSURE: 103 MMHG | RESPIRATION RATE: 18 BRPM | OXYGEN SATURATION: 94 % | SYSTOLIC BLOOD PRESSURE: 110 MMHG | DIASTOLIC BLOOD PRESSURE: 72 MMHG | RESPIRATION RATE: 16 BRPM | OXYGEN SATURATION: 97 % | HEART RATE: 75 BPM | HEIGHT: 62 IN | HEART RATE: 60 BPM | DIASTOLIC BLOOD PRESSURE: 71 MMHG | HEART RATE: 66 BPM | SYSTOLIC BLOOD PRESSURE: 112 MMHG

## 2020-07-29 DIAGNOSIS — K52.9 NONINFECTIVE GASTROENTERITIS AND COLITIS, UNSPECIFIED: ICD-10-CM

## 2020-07-29 DIAGNOSIS — K64.1 SECOND DEGREE HEMORRHOIDS: ICD-10-CM

## 2020-07-29 DIAGNOSIS — K63.3 ULCER OF INTESTINE: ICD-10-CM

## 2020-07-29 LAB
GI HISTOLOGY: A. SELECT: (no result)
GI HISTOLOGY: B. SELECT: (no result)
GI HISTOLOGY: C. UNSPECIFIED: (no result)
GI HISTOLOGY: PDF REPORT: (no result)

## 2020-07-29 PROCEDURE — 45380 COLONOSCOPY AND BIOPSY: CPT | Performed by: INTERNAL MEDICINE

## 2020-07-29 PROCEDURE — 88305 TISSUE EXAM BY PATHOLOGIST: CPT | Mod: 26 | Performed by: INTERNAL MEDICINE

## 2020-07-29 NOTE — SERVICEHPINOTES
TRANG AYALA  is a  20  female   who presents today for a  Colonoscopy   for   the indications listed below. The updated Patient Profile was reviewed prior to the procedure, in conjunction with the Physical Exam, including medical conditions, surgical procedures, medications, allergies, family history and social history. See Physical Exam time stamp below for date and time of HPI completion.Pre-operatively, I reviewed the indication(s) for the procedure, the risks of the procedure [including but not limited to: unexpected bleeding possibly requiring hospitalization and/or unplanned repeat procedures, perforation possibly requiring surgical treatment, missed lesions and complications of sedation/MAC (also explained by anesthesia staff)]. I have evaluated the patient for risks associated with the planned anesthesia and the procedure to be performed and find the patient an acceptable candidate for IV sedation.Multiple opportunities were provided for any questions or concerns, and all questions were answered satisfactorily before any anesthesia was administered. We will proceed with the planned procedure.BR

## 2020-07-31 ENCOUNTER — PATIENT MESSAGE (OUTPATIENT)
Dept: FAMILY MEDICINE CLINIC | Facility: CLINIC | Age: 20
End: 2020-07-31

## 2020-10-30 ENCOUNTER — TELEMEDICINE (OUTPATIENT)
Dept: FAMILY MEDICINE CLINIC | Facility: TELEHEALTH | Age: 20
End: 2020-10-30

## 2020-10-30 VITALS — TEMPERATURE: 97.8 F

## 2020-10-30 DIAGNOSIS — H92.01 OTALGIA OF RIGHT EAR: ICD-10-CM

## 2020-10-30 DIAGNOSIS — J02.9 ACUTE PHARYNGITIS, UNSPECIFIED ETIOLOGY: Primary | ICD-10-CM

## 2020-10-30 PROCEDURE — 99213 OFFICE O/P EST LOW 20 MIN: CPT | Performed by: NURSE PRACTITIONER

## 2020-10-30 RX ORDER — SORBITOL SOLUTION 70 %
SOLUTION, ORAL MISCELLANEOUS
COMMUNITY
Start: 2020-07-27 | End: 2021-06-10

## 2020-10-30 RX ORDER — AMOXICILLIN 500 MG/1
500 CAPSULE ORAL 2 TIMES DAILY
Qty: 20 CAPSULE | Refills: 0 | Status: SHIPPED | OUTPATIENT
Start: 2020-10-30 | End: 2020-11-09

## 2020-10-30 NOTE — PROGRESS NOTES
Jeremiah Cade  2000    Chief Complaint   Patient presents with   • Cough     Ear pain and sore throat   • Nasal Congestion       HPI  Jeremiah Cade is a 20 y.o. female with complaints of dry cough, right ear pain and pressure, post nasal drip, sinus and nasal congestion, sore throat and swollen glands. Symptoms began 2 days ago. Associated symptoms include general malaise. 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 Patient does not a history of asthma. Patient does not smoke cigarettes.       The following portions of the patient's history were reviewed and updated as appropriate: allergies, current medications, past social history and problem list.    Past Medical History:   Diagnosis Date   • Abdominal bloating    • Depression    • Eczema    • Flatulence    • Nausea    • PONV (postoperative nausea and vomiting)     after tooth extraction     Social History     Socioeconomic History   • Marital status:      Spouse name: Not on file   • Number of children: Not on file   • Years of education: Not on file   • Highest education level: Not on file   Tobacco Use   • Smoking status: Current Every Day Smoker     Packs/day: 0.00     Years: 0.50     Pack years: 0.00     Types: Electronic Cigarette   • Smokeless tobacco: Never Used   Substance and Sexual Activity   • Alcohol use: Yes     Frequency: Never     Comment: socially   • Drug use: No   • Sexual activity: Defer       REVIEW OF SYSTEMS  History obtained from the patient  General ROS: negative for - chills or fever  ENT ROS: positive for - nasal congestion and sore throat  Respiratory ROS: positive for - cough  negative for - shortness of breath or wheezing    PHYSICAL EXAM  Temp 97.8 °F (36.6 °C) (Oral)     CONSTITUTIONAL:alert, well appearing, and in no distress  ENT:pharynx erythematous without exudate, sinuses nontender and nasal mucosa  congested  LYMPH:bilateral tonsillar lymphadenopathy  CHEST:respiratory effort normal  PSYCH:alert, normal affect and speech     Diagnoses and all orders for this visit:    1. Acute pharyngitis, unspecified etiology (Primary)  -     amoxicillin (AMOXIL) 500 MG capsule; Take 1 capsule by mouth 2 (Two) Times a Day for 10 days.  Dispense: 20 capsule; Refill: 0    2. Otalgia of right ear    Take antibiotic as prescribed. Off work for 2 days. Follow up if no improvement in 4-5 days or sooner if symptoms worsen.    [id number] - [place], [place] [facility] [address] [facility] [facility] [phone]  - [phone]  [address] [facility] [place] [address]  Phone: [phone] Fax: [phone]      This visit was performed via Telehealth. This patient has been instructed to follow up with their primary care provider or Urgent Care if their symptoms worsen or there is no improvement with the treatment provided for the illness. The patient has been instructed to go to the nearest Emergency Department for high fever, chest pain, shortness of breath or any other life-threatening symptoms.       Lu Hart, ENRIQUE  10/30/20  14:32 EDT

## 2020-10-30 NOTE — PATIENT INSTRUCTIONS
Sore Throat  A sore throat is pain, burning, irritation, or scratchiness in the throat. When you have a sore throat, you may feel pain or tenderness in your throat when you swallow or talk.  Many things can cause a sore throat, including:  · An infection.  · Seasonal allergies.  · Dryness in the air.  · Irritants, such as smoke or pollution.  · Radiation treatment to the area.  · Gastroesophageal reflux disease (GERD).  · A tumor.  A sore throat is often the first sign of another sickness. It may happen with other symptoms, such as coughing, sneezing, fever, and swollen neck glands. Most sore throats go away without medical treatment.  Follow these instructions at home:         · Take over-the-counter medicines only as told by your health care provider.  ? If your child has a sore throat, do not give your child aspirin because of the association with Reye syndrome.  · Drink enough fluids to keep your urine pale yellow.  · Rest as needed.  · To help with pain, try:  ? Sipping warm liquids, such as broth, herbal tea, or warm water.  ? Eating or drinking cold or frozen liquids, such as frozen ice pops.  ? Gargling with a salt-water mixture 3-4 times a day or as needed. To make a salt-water mixture, completely dissolve ½-1 tsp (3-6 g) of salt in 1 cup (237 mL) of warm water.  ? Sucking on hard candy or throat lozenges.  ? Putting a cool-mist humidifier in your bedroom at night to moisten the air.  ? Sitting in the bathroom with the door closed for 5-10 minutes while you run hot water in the shower.  · Do not use any products that contain nicotine or tobacco, such as cigarettes, e-cigarettes, and chewing tobacco. If you need help quitting, ask your health care provider.  · Wash your hands well and often with soap and water. If soap and water are not available, use hand .  Contact a health care provider if:  · You have a fever for more than 2-3 days.  · You have symptoms that last (are persistent) for more than  2-3 days.  · Your throat does not get better within 7 days.  · You have a fever and your symptoms suddenly get worse.  · Your child who is 3 months to 3 years old has a temperature of 102.2°F (39°C) or higher.  Get help right away if:  · You have difficulty breathing.  · You cannot swallow fluids, soft foods, or your saliva.  · You have increased swelling in your throat or neck.  · You have persistent nausea and vomiting.  Summary  · A sore throat is pain, burning, irritation, or scratchiness in the throat. Many things can cause a sore throat.  · Take over-the-counter medicines only as told by your health care provider. Do not give your child aspirin.  · Drink plenty of fluids, and rest as needed.  · Contact a health care provider if your symptoms worsen or your sore throat does not get better within 7 days.  This information is not intended to replace advice given to you by your health care provider. Make sure you discuss any questions you have with your health care provider.  Document Released: 01/25/2006 Document Revised: 05/20/2019 Document Reviewed: 05/20/2019  [employer] Medical Patient Education © 2020 [employer]

## 2021-01-08 DIAGNOSIS — F51.04 PSYCHOPHYSIOLOGICAL INSOMNIA: Primary | ICD-10-CM

## 2021-01-08 RX ORDER — TRAZODONE HYDROCHLORIDE 50 MG/1
50 TABLET ORAL NIGHTLY
Qty: 30 TABLET | Refills: 2 | Status: SHIPPED | OUTPATIENT
Start: 2021-01-08 | End: 2021-02-22 | Stop reason: DRUGHIGH

## 2021-02-22 ENCOUNTER — OFFICE VISIT (OUTPATIENT)
Dept: FAMILY MEDICINE CLINIC | Facility: CLINIC | Age: 21
End: 2021-02-22

## 2021-02-22 VITALS
HEART RATE: 62 BPM | DIASTOLIC BLOOD PRESSURE: 74 MMHG | HEIGHT: 62 IN | WEIGHT: 200.4 LBS | OXYGEN SATURATION: 98 % | SYSTOLIC BLOOD PRESSURE: 112 MMHG | BODY MASS INDEX: 36.88 KG/M2 | TEMPERATURE: 97.3 F

## 2021-02-22 DIAGNOSIS — F51.01 PRIMARY INSOMNIA: Chronic | ICD-10-CM

## 2021-02-22 DIAGNOSIS — Z00.00 PHYSICAL EXAM, ANNUAL: Primary | ICD-10-CM

## 2021-02-22 DIAGNOSIS — F33.0 MILD EPISODE OF RECURRENT MAJOR DEPRESSIVE DISORDER (HCC): Chronic | ICD-10-CM

## 2021-02-22 DIAGNOSIS — Z23 NEED FOR HPV VACCINE: ICD-10-CM

## 2021-02-22 PROBLEM — K59.1 FUNCTIONAL DIARRHEA: Chronic | Status: RESOLVED | Noted: 2019-07-15 | Resolved: 2021-02-22

## 2021-02-22 PROBLEM — Z86.19 HISTORY OF CLOSTRIDIUM DIFFICILE INFECTION: Status: ACTIVE | Noted: 2021-02-22

## 2021-02-22 PROCEDURE — 90651 9VHPV VACCINE 2/3 DOSE IM: CPT | Performed by: FAMILY MEDICINE

## 2021-02-22 PROCEDURE — 99395 PREV VISIT EST AGE 18-39: CPT | Performed by: FAMILY MEDICINE

## 2021-02-22 PROCEDURE — 90471 IMMUNIZATION ADMIN: CPT | Performed by: FAMILY MEDICINE

## 2021-02-22 RX ORDER — TRAZODONE HYDROCHLORIDE 100 MG/1
100 TABLET ORAL NIGHTLY
Qty: 30 TABLET | Refills: 3 | Status: SHIPPED | OUTPATIENT
Start: 2021-02-22 | End: 2021-07-26

## 2021-02-22 RX ORDER — NORETHINDRONE ACETATE AND ETHINYL ESTRADIOL AND FERROUS FUMARATE 1MG-20(21)
1 KIT ORAL DAILY
COMMUNITY
Start: 2021-02-07 | End: 2022-08-04

## 2021-02-22 NOTE — PROGRESS NOTES
Subjective   Jeremiah Cade is a 20 y.o. female.   Chief Complaint   Patient presents with   • Annual Exam       History of Present Illness   Presents to the office today for annual preventive care exam, and follow-up on recent medicine changes.  She had her annual Pap smear with Dr. Petersen in November 2020.  She has had 2 Gardasil injections and is due for her third.    She had some depression and insomnia.  She called in back in January and wanted to try to wean down the sertraline and try trazodone to help with the insomnia.  I instructed her to decrease her decrease her sertraline to 25 mg every morning and restarted trazodone 50 mg every evening.  This was on January 8.  Since making these changes, she felt like she was sleeping better for a while, but that effect did not last.  She states that she does not really fall asleep very well anymore on the current dose of medication.  She continues to feel that the Zoloft 25 mg/day is helpful.    She becomes tearful and describes ongoing belly problems.  She states she will get severe pain on the right side of her abdomen.  She states that a few weeks ago she actually had episodes of fecal incontinence.  She will typically have constipation.  She had a colonoscopy last July and has a follow-up with gastroenterology in 2 weeks on March 8.      Patient Active Problem List    Diagnosis Date Noted   • History of Clostridium difficile infection 02/22/2021   • Hot flashes 08/14/2019   • Depression 07/09/2019   • Anxiety 07/09/2019   • Insomnia disorder 07/09/2019   • PCOS (polycystic ovarian syndrome) 07/09/2019   • Allergic rhinitis 12/09/2014   • Irregular menses 01/02/2014   • Otalgia 10/22/2012   • Acne vulgaris 08/28/2012           Past Surgical History:   Procedure Laterality Date   • COLONOSCOPY W/ BIOPSIES  07/29/2020    Dr. Agudelo - shawna c/w erosions   • TOOTH EXTRACTION      x3     Current Outpatient Medications on File Prior to Visit   Medication Sig   •  "ELDERBERRY PO Take  by mouth.   • hyoscyamine sulfate (ANASPAZ) 0.125 MG tablet dispersible disintegrating tablet Place 125 mcg on the tongue As Needed.   • Junel FE 1/20 1-20 MG-MCG per tablet Take 1 tablet by mouth Daily.   • sertraline (Zoloft) 50 MG tablet Take 0.5 tablets by mouth Daily.   • [DISCONTINUED] traZODone (DESYREL) 50 MG tablet Take 1 tablet by mouth Every Night.   • sorbitol 70 % solution solution TAKE 100 ML BY MOUTH AS DIRECTED FOR 1 DAY     No current facility-administered medications on file prior to visit.      No Known Allergies  Social History     Socioeconomic History   • Marital status:      Spouse name: Not on file   • Number of children: Not on file   • Years of education: Not on file   • Highest education level: Not on file   Tobacco Use   • Smoking status: Current Every Day Smoker     Packs/day: 0.00     Years: 0.50     Pack years: 0.00     Types: Electronic Cigarette   • Smokeless tobacco: Never Used   Substance and Sexual Activity   • Alcohol use: Yes     Frequency: Never     Comment: socially   • Drug use: No   • Sexual activity: Defer     Family History   Problem Relation Age of Onset   • Hypertension Mother    • Hypertension Father    • Heart attack Father    • Cervical cancer Maternal Grandmother    • Cancer Maternal Grandmother    • Diabetes Paternal Grandfather    • Heart attack Paternal Grandmother 40       Review of Systems    Objective   /74 (BP Location: Right arm, Patient Position: Sitting, Cuff Size: Adult)   Pulse 62   Temp 97.3 °F (36.3 °C) (Infrared)   Ht 157.5 cm (62.01\")   Wt 90.9 kg (200 lb 6.4 oz)   SpO2 98%   BMI 36.64 kg/m²   Physical Exam  Constitutional:       General: She is not in acute distress.     Appearance: She is well-developed.      Comments: Wearing a face mask     HENT:      Head: Normocephalic and atraumatic.   Eyes:      Conjunctiva/sclera: Conjunctivae normal.   Neck:      Musculoskeletal: Normal range of motion. "   Cardiovascular:      Rate and Rhythm: Normal rate and regular rhythm.      Heart sounds: Normal heart sounds. No murmur.   Pulmonary:      Effort: Pulmonary effort is normal. No respiratory distress.      Breath sounds: Normal breath sounds.   Abdominal:      General: Abdomen is flat. Bowel sounds are normal. There is no distension.      Palpations: There is no mass.      Tenderness: There is abdominal tenderness (LLQ).   Musculoskeletal: Normal range of motion.      Right lower leg: No edema.      Left lower leg: No edema.   Skin:     General: Skin is warm and dry.      Findings: No rash.   Neurological:      Mental Status: She is alert and oriented to person, place, and time.   Psychiatric:         Behavior: Behavior normal.       Assessment/Plan   Diagnoses and all orders for this visit:    1. Physical exam, annual (Primary)    2. Primary insomnia  -     traZODone (DESYREL) 100 MG tablet; Take 1 tablet by mouth Every Night.  Dispense: 30 tablet; Refill: 3    3. Need for HPV vaccine  -     HPV Vaccine (HPV9)    4. Mild episode of recurrent major depressive disorder (CMS/HCC)    Physical exam is unremarkable except for some tenderness to palpation over the left lower quadrant.  She is up-to-date on Pap smears.  We will update her Gardasil today and give her her third injection.  Continue Zoloft at current dose.  Increase trazodone to 100 mg every evening.  Recommend she talk to the gastroenterologist on the eighth regarding her recurrent abdominal pain, fecal incontinence, and constipation.  It sounds like she may need to have her gallbladder evaluated.  If this is not done, let me know and I will consider doing that gallbladder work-up.  Follow-up in about 3 months.             Return in about 3 months (around 5/22/2021) for Recheck insomnia.    Call with any problems or concerns before next visit

## 2021-03-09 ENCOUNTER — OFFICE (AMBULATORY)
Dept: URBAN - METROPOLITAN AREA CLINIC 64 | Facility: CLINIC | Age: 21
End: 2021-03-09
Payer: COMMERCIAL

## 2021-03-09 ENCOUNTER — TELEPHONE (OUTPATIENT)
Dept: FAMILY MEDICINE CLINIC | Facility: CLINIC | Age: 21
End: 2021-03-09

## 2021-03-09 VITALS
WEIGHT: 202 LBS | SYSTOLIC BLOOD PRESSURE: 113 MMHG | DIASTOLIC BLOOD PRESSURE: 65 MMHG | HEIGHT: 62 IN | HEART RATE: 62 BPM

## 2021-03-09 DIAGNOSIS — R14.0 ABDOMINAL DISTENSION (GASEOUS): ICD-10-CM

## 2021-03-09 DIAGNOSIS — R10.11 RIGHT UPPER QUADRANT PAIN: ICD-10-CM

## 2021-03-09 DIAGNOSIS — R19.4 CHANGE IN BOWEL HABIT: ICD-10-CM

## 2021-03-09 DIAGNOSIS — R11.0 NAUSEA: ICD-10-CM

## 2021-03-09 DIAGNOSIS — R10.10 UPPER ABDOMINAL PAIN, UNSPECIFIED: ICD-10-CM

## 2021-03-09 PROCEDURE — 99213 OFFICE O/P EST LOW 20 MIN: CPT | Performed by: NURSE PRACTITIONER

## 2021-03-09 NOTE — TELEPHONE ENCOUNTER
PT JUST LEFT DR GERI RYAN, AND SHE WANTS TO DO GALL BLADDER AND FLOOD ALLERGY TESTING, AND A OTHER THINGS

## 2021-03-18 ENCOUNTER — TELEPHONE (OUTPATIENT)
Dept: FAMILY MEDICINE CLINIC | Facility: CLINIC | Age: 21
End: 2021-03-18

## 2021-03-18 NOTE — TELEPHONE ENCOUNTER
pulled today.      Last filled: 8/1/2019     Last office visit: 6/10/2019 PATIENT CALLED WANTING TO UPDATE DR. FRAN REINOSO THAT [facility] SENT OVER LAB TEST THAT WAS DONE LAST WEEK.    PATIENT CALL BACK  [phone]

## 2021-03-29 ENCOUNTER — TRANSCRIBE ORDERS (OUTPATIENT)
Dept: ADMINISTRATIVE | Facility: HOSPITAL | Age: 21
End: 2021-03-29

## 2021-03-29 DIAGNOSIS — R14.0 BLOATING: ICD-10-CM

## 2021-03-29 DIAGNOSIS — R10.11 RIGHT UPPER QUADRANT ABDOMINAL PAIN: Primary | ICD-10-CM

## 2021-03-29 DIAGNOSIS — R10.11 RIGHT UPPER QUADRANT ABDOMINAL PAIN: ICD-10-CM

## 2021-03-29 DIAGNOSIS — R11.0 NAUSEA: ICD-10-CM

## 2021-03-29 DIAGNOSIS — R19.8 ALTERED BOWEL FUNCTION: ICD-10-CM

## 2021-04-12 ENCOUNTER — HOSPITAL ENCOUNTER (OUTPATIENT)
Dept: NUCLEAR MEDICINE | Facility: HOSPITAL | Age: 21
Discharge: HOME | End: 2021-04-12

## 2021-04-12 ENCOUNTER — HOSPITAL ENCOUNTER (OUTPATIENT)
Dept: ULTRASOUND IMAGING | Facility: HOSPITAL | Age: 21
Discharge: HOME | End: 2021-04-12
Admitting: NURSE PRACTITIONER

## 2021-04-12 DIAGNOSIS — R19.8 ALTERED BOWEL FUNCTION: ICD-10-CM

## 2021-04-12 DIAGNOSIS — R10.11 RIGHT UPPER QUADRANT ABDOMINAL PAIN: ICD-10-CM

## 2021-04-12 DIAGNOSIS — R14.0 BLOATING: ICD-10-CM

## 2021-04-12 DIAGNOSIS — R11.0 NAUSEA: ICD-10-CM

## 2021-04-12 PROCEDURE — A9537 TC99M MEBROFENIN: HCPCS | Performed by: NURSE PRACTITIONER

## 2021-04-12 PROCEDURE — 76705 ECHO EXAM OF ABDOMEN: CPT

## 2021-04-12 PROCEDURE — 78227 HEPATOBIL SYST IMAGE W/DRUG: CPT

## 2021-04-12 PROCEDURE — 0 TECHNETIUM TC 99M MEBROFENIN KIT: Performed by: NURSE PRACTITIONER

## 2021-04-12 RX ORDER — KIT FOR THE PREPARATION OF TECHNETIUM TC 99M MEBROFENIN 45 MG/10ML
1 INJECTION, POWDER, LYOPHILIZED, FOR SOLUTION INTRAVENOUS
Status: COMPLETED | OUTPATIENT
Start: 2021-04-12 | End: 2021-04-12

## 2021-04-12 RX ADMIN — MEBROFENIN 1 DOSE: 45 INJECTION, POWDER, LYOPHILIZED, FOR SOLUTION INTRAVENOUS at 11:25

## 2021-04-21 ENCOUNTER — OFFICE (AMBULATORY)
Dept: URBAN - METROPOLITAN AREA CLINIC 64 | Facility: CLINIC | Age: 21
End: 2021-04-21
Payer: COMMERCIAL

## 2021-04-21 VITALS
HEART RATE: 59 BPM | WEIGHT: 199 LBS | SYSTOLIC BLOOD PRESSURE: 103 MMHG | HEIGHT: 62 IN | DIASTOLIC BLOOD PRESSURE: 63 MMHG

## 2021-04-21 DIAGNOSIS — R11.0 NAUSEA: ICD-10-CM

## 2021-04-21 DIAGNOSIS — R10.10 UPPER ABDOMINAL PAIN, UNSPECIFIED: ICD-10-CM

## 2021-04-21 DIAGNOSIS — R19.4 CHANGE IN BOWEL HABIT: ICD-10-CM

## 2021-04-21 DIAGNOSIS — R14.0 ABDOMINAL DISTENSION (GASEOUS): ICD-10-CM

## 2021-04-21 PROCEDURE — 99213 OFFICE O/P EST LOW 20 MIN: CPT | Performed by: NURSE PRACTITIONER

## 2021-05-20 ENCOUNTER — ON CAMPUS - OUTPATIENT (AMBULATORY)
Dept: URBAN - METROPOLITAN AREA HOSPITAL 2 | Facility: HOSPITAL | Age: 21
End: 2021-05-20
Payer: COMMERCIAL

## 2021-05-20 ENCOUNTER — OFFICE (AMBULATORY)
Dept: URBAN - METROPOLITAN AREA PATHOLOGY 4 | Facility: PATHOLOGY | Age: 21
End: 2021-05-20
Payer: COMMERCIAL

## 2021-05-20 VITALS
HEART RATE: 66 BPM | SYSTOLIC BLOOD PRESSURE: 138 MMHG | DIASTOLIC BLOOD PRESSURE: 54 MMHG | TEMPERATURE: 96.9 F | WEIGHT: 196 LBS | HEART RATE: 74 BPM | DIASTOLIC BLOOD PRESSURE: 88 MMHG | SYSTOLIC BLOOD PRESSURE: 137 MMHG | SYSTOLIC BLOOD PRESSURE: 124 MMHG | SYSTOLIC BLOOD PRESSURE: 136 MMHG | DIASTOLIC BLOOD PRESSURE: 71 MMHG | HEART RATE: 72 BPM | OXYGEN SATURATION: 99 % | DIASTOLIC BLOOD PRESSURE: 69 MMHG | DIASTOLIC BLOOD PRESSURE: 68 MMHG | SYSTOLIC BLOOD PRESSURE: 132 MMHG | RESPIRATION RATE: 18 BRPM | HEART RATE: 79 BPM | HEIGHT: 62 IN | HEART RATE: 60 BPM | SYSTOLIC BLOOD PRESSURE: 133 MMHG | OXYGEN SATURATION: 97 % | HEART RATE: 61 BPM | RESPIRATION RATE: 16 BRPM | DIASTOLIC BLOOD PRESSURE: 64 MMHG

## 2021-05-20 DIAGNOSIS — R10.13 EPIGASTRIC PAIN: ICD-10-CM

## 2021-05-20 DIAGNOSIS — K22.2 ESOPHAGEAL OBSTRUCTION: ICD-10-CM

## 2021-05-20 DIAGNOSIS — R11.0 NAUSEA: ICD-10-CM

## 2021-05-20 PROBLEM — K31.89 OTHER DISEASES OF STOMACH AND DUODENUM: Status: ACTIVE | Noted: 2021-05-20

## 2021-05-20 LAB
GI HISTOLOGY: A. SELECT: (no result)
GI HISTOLOGY: B. UNSPECIFIED: (no result)
GI HISTOLOGY: PDF REPORT: (no result)

## 2021-05-20 PROCEDURE — 43450 DILATE ESOPHAGUS 1/MULT PASS: CPT | Performed by: INTERNAL MEDICINE

## 2021-05-20 PROCEDURE — 88305 TISSUE EXAM BY PATHOLOGIST: CPT | Mod: 26 | Performed by: INTERNAL MEDICINE

## 2021-05-20 PROCEDURE — 43239 EGD BIOPSY SINGLE/MULTIPLE: CPT | Performed by: INTERNAL MEDICINE

## 2021-05-20 RX ORDER — OMEPRAZOLE 20 MG/1
20 CAPSULE, DELAYED RELEASE ORAL
Qty: 90 | Refills: 3 | Status: ACTIVE
Start: 2021-05-20

## 2021-06-10 ENCOUNTER — TELEMEDICINE (OUTPATIENT)
Dept: FAMILY MEDICINE CLINIC | Facility: TELEHEALTH | Age: 21
End: 2021-06-10

## 2021-06-10 DIAGNOSIS — J06.9 ACUTE URI: Primary | ICD-10-CM

## 2021-06-10 PROCEDURE — 99213 OFFICE O/P EST LOW 20 MIN: CPT | Performed by: NURSE PRACTITIONER

## 2021-06-10 RX ORDER — PREDNISONE 20 MG/1
20 TABLET ORAL 2 TIMES DAILY
Qty: 6 TABLET | Refills: 0 | Status: SHIPPED | OUTPATIENT
Start: 2021-06-10 | End: 2021-06-13

## 2021-06-10 RX ORDER — DEXTROMETHORPHAN HYDROBROMIDE AND PROMETHAZINE HYDROCHLORIDE 15; 6.25 MG/5ML; MG/5ML
5 SYRUP ORAL 4 TIMES DAILY PRN
Qty: 120 ML | Refills: 0 | Status: SHIPPED | OUTPATIENT
Start: 2021-06-10 | End: 2021-06-17

## 2021-06-10 RX ORDER — OMEPRAZOLE 20 MG/1
20 CAPSULE, DELAYED RELEASE ORAL DAILY
COMMUNITY
End: 2021-08-31 | Stop reason: SDUPTHER

## 2021-06-10 NOTE — PROGRESS NOTES
MARIA A Cade is a 21 y.o. female  presents with complaint of 4 day history of sore throat, hoarse voice, cough, congestion. Was at high school graduation day before symptoms started.    No white patches in throat but it does have red spots and tonsils are swollen.    Has taken advil cold and sinus but it is not helping.      Review of Systems   Constitutional: Positive for diaphoresis (feels hot but thermometer shows normal temp). Negative for fever.   HENT: Positive for congestion, rhinorrhea and sore throat. Negative for ear pain.    Respiratory: Positive for cough.    Cardiovascular: Negative for chest pain.   Gastrointestinal: Negative for diarrhea, nausea and vomiting.       Past Medical History:   Diagnosis Date   • Abdominal bloating    • Depression    • Eczema    • Flatulence    • Nausea    • PONV (postoperative nausea and vomiting)     after tooth extraction       Family History   Problem Relation Age of Onset   • Hypertension Mother    • Hypertension Father    • Heart attack Father    • Cervical cancer Maternal Grandmother    • Cancer Maternal Grandmother    • Diabetes Paternal Grandfather    • Heart attack Paternal Grandmother 40       Social History     Socioeconomic History   • Marital status:      Spouse name: Not on file   • Number of children: Not on file   • Years of education: Not on file   • Highest education level: Not on file   Tobacco Use   • Smoking status: Current Every Day Smoker     Packs/day: 0.00     Years: 0.50     Pack years: 0.00     Types: Electronic Cigarette   • Smokeless tobacco: Never Used   Substance and Sexual Activity   • Alcohol use: Yes     Comment: socially   • Drug use: No   • Sexual activity: Defer         There were no vitals taken for this visit.    PHYSICAL EXAM  Physical Exam   Constitutional: She appears well-developed and well-nourished.   HENT:   Head: Normocephalic.   Nose: Rhinorrhea present.   Neck: Neck normal appearance.  Pulmonary/Chest: Effort  normal.   Neurological: She is alert.   Psychiatric: She has a normal mood and affect. Her speech is normal.       Diagnoses and all orders for this visit:    1. Acute URI (Primary)  -     predniSONE (DELTASONE) 20 MG tablet; Take 1 tablet by mouth 2 (Two) Times a Day for 3 days.  Dispense: 6 tablet; Refill: 0  -     promethazine-dextromethorphan (PROMETHAZINE-DM) 6.25-15 MG/5ML syrup; Take 5 mL by mouth 4 (Four) Times a Day As Needed for Cough for up to 7 days.  Dispense: 120 mL; Refill: 0          FOLLOW-UP  As discussed during visit with [facility], if symptoms worsen or fail to improve, follow-up with PCP/Urgent Care/Emergency Department.    Patient verbalizes understanding of medications, instructions for treatment and follow-up.    Dayanara Cortez, ENRIQUE  06/10/2021  13:10 EDT    This visit was performed via Telehealth.  This patient has been instructed to follow-up with their primary care provider if their symptoms worsen or the treatment provided does not resolve their illness.

## 2021-06-10 NOTE — PATIENT INSTRUCTIONS

## 2021-07-24 DIAGNOSIS — F51.01 PRIMARY INSOMNIA: Chronic | ICD-10-CM

## 2021-07-26 RX ORDER — TRAZODONE HYDROCHLORIDE 100 MG/1
TABLET ORAL
Qty: 30 TABLET | Refills: 3 | Status: SHIPPED | OUTPATIENT
Start: 2021-07-26 | End: 2022-08-04 | Stop reason: SINTOL

## 2021-08-10 ENCOUNTER — OFFICE VISIT (OUTPATIENT)
Dept: FAMILY MEDICINE CLINIC | Facility: CLINIC | Age: 21
End: 2021-08-10

## 2021-08-10 VITALS
DIASTOLIC BLOOD PRESSURE: 60 MMHG | WEIGHT: 195 LBS | SYSTOLIC BLOOD PRESSURE: 120 MMHG | BODY MASS INDEX: 35.88 KG/M2 | TEMPERATURE: 98.7 F | HEIGHT: 62 IN | OXYGEN SATURATION: 99 % | HEART RATE: 60 BPM

## 2021-08-10 DIAGNOSIS — M25.511 ACUTE PAIN OF RIGHT SHOULDER: ICD-10-CM
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
## 2021-08-10 DIAGNOSIS — M25.552 LEFT HIP PAIN: ICD-10-CM

## 2021-08-10 PROCEDURE — 99214 OFFICE O/P EST MOD 30 MIN: CPT | Performed by: FAMILY MEDICINE

## 2021-08-10 RX ORDER — METAXALONE 800 MG/1
800 TABLET ORAL 3 TIMES DAILY PRN
Qty: 60 TABLET | Refills: 1 | Status: SHIPPED | OUTPATIENT
Start: 2021-08-10 | End: 2021-08-31
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
## 2021-08-16 ENCOUNTER — TELEPHONE (OUTPATIENT)
Dept: FAMILY MEDICINE CLINIC | Facility: CLINIC | Age: 21
End: 2021-08-16
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
## 2021-08-16 DIAGNOSIS — M25.511 ACUTE PAIN OF RIGHT SHOULDER: ICD-10-CM

## 2021-08-16 DIAGNOSIS — M25.552 LEFT HIP PAIN: ICD-10-CM
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
## 2021-08-31 ENCOUNTER — OFFICE VISIT (OUTPATIENT)
Dept: FAMILY MEDICINE CLINIC | Facility: CLINIC | Age: 21
End: 2021-08-31

## 2021-08-31 VITALS
DIASTOLIC BLOOD PRESSURE: 60 MMHG | HEIGHT: 62 IN | TEMPERATURE: 98.6 F | OXYGEN SATURATION: 98 % | SYSTOLIC BLOOD PRESSURE: 108 MMHG | WEIGHT: 196 LBS | BODY MASS INDEX: 36.07 KG/M2 | HEART RATE: 72 BPM

## 2021-08-31 DIAGNOSIS — M25.511 ACUTE PAIN OF RIGHT SHOULDER: ICD-10-CM

## 2021-08-31 DIAGNOSIS — M25.552 LEFT HIP PAIN: ICD-10-CM

## 2021-08-31 DIAGNOSIS — Z87.19 H/O GASTROESOPHAGEAL REFLUX (GERD): ICD-10-CM
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
## 2021-08-31 PROCEDURE — 99214 OFFICE O/P EST MOD 30 MIN: CPT | Performed by: FAMILY MEDICINE

## 2021-08-31 RX ORDER — OMEPRAZOLE 20 MG/1
20 CAPSULE, DELAYED RELEASE ORAL DAILY
Qty: 30 CAPSULE | Refills: 2 | Status: SHIPPED | OUTPATIENT
Start: 2021-08-31 | End: 2022-08-04

## 2021-08-31 RX ORDER — CYCLOBENZAPRINE HCL 10 MG
10 TABLET ORAL NIGHTLY
Qty: 14 TABLET | Refills: 0 | Status: SHIPPED | OUTPATIENT
Start: 2021-08-31 | End: 2021-09-20 | Stop reason: SDUPTHER

## 2021-09-01 ENCOUNTER — TELEPHONE (OUTPATIENT)
Dept: FAMILY MEDICINE CLINIC | Facility: CLINIC | Age: 21
End: 2021-09-01

## 2021-09-01 ENCOUNTER — PATIENT MESSAGE (OUTPATIENT)
Dept: FAMILY MEDICINE CLINIC | Facility: CLINIC | Age: 21
End: 2021-09-01

## 2021-09-01 NOTE — TELEPHONE ENCOUNTER
----- Message from Bela Vera sent at 9/1/2021  1:09 PM EDT -----  Regarding: FW: Visit Follow-Up Question  Contact: [phone]    ----- Message -----  From: Jeremiah Cade  Sent: 9/1/2021   1:08 PM EDT  To: [facility]  Subject: RE: Visit Follow-Up Question                     Just that I was there for an appointment starting at 3:45 yesterday and am clear to return to work with no restrictions. Thank you!   ----- Message -----  From: GLADYS LUCIO  Sent: 9/1/21, 1:06 PM  To: Jeremiah Cade  Subject: RE: Visit Follow-Up Question    Reinaldo Daniels! What days do you need the note to state?      ----- Message -----       From:Jeremiah Cade       Sent:9/1/2021 11:00 AM EDT         To:She Pickett MD    Subject:Visit Follow-Up Question    Hey! I forgot to get a work excuse from you guys. Is there any way I could swing by and pick one up today?

## 2021-09-01 NOTE — TELEPHONE ENCOUNTER
----- Message from She Abarca MA sent at 9/1/2021 11:44 AM EDT -----  Regarding: FW: Visit Follow-Up Question  Contact: [phone]  How long does her note need to have her off?  ----- Message -----  From: Bela Vera  Sent: 9/1/2021  11:06 AM EDT  To: She Abarca MA  Subject: FW: Visit Follow-Up Question                       ----- Message -----  From: Jeremiah Cade  Sent: 9/1/2021  11:00 AM EDT  To: [place] [facility]  Subject: Visit Follow-Up Question                         Hey! I forgot to get a work excuse from you guys. Is there any way I could swing by and pick one up today?

## 2021-09-01 NOTE — TELEPHONE ENCOUNTER
She came into the office visit yesterday with a work shirt on.  I thought she was working.  Please ask her if she just needs a note for the day she came in?  Thanks

## 2021-09-20 DIAGNOSIS — M25.552 LEFT HIP PAIN: ICD-10-CM

## 2021-09-20 DIAGNOSIS — M25.511 ACUTE PAIN OF RIGHT SHOULDER: ICD-10-CM

## 2021-09-20 RX ORDER — CYCLOBENZAPRINE HCL 10 MG
10 TABLET ORAL NIGHTLY
Qty: 14 TABLET | Refills: 1 | Status: SHIPPED | OUTPATIENT
Start: 2021-09-20 | End: 2022-08-04

## 2021-10-08 ENCOUNTER — TREATMENT (OUTPATIENT)
Dept: PHYSICAL THERAPY | Facility: CLINIC | Age: 21
End: 2021-10-08

## 2021-10-08 DIAGNOSIS — M25.552 LEFT HIP PAIN: ICD-10-CM
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
## 2021-10-08 DIAGNOSIS — M25.511 ACUTE PAIN OF RIGHT SHOULDER: Primary | ICD-10-CM

## 2021-10-08 PROCEDURE — 97014 ELECTRIC STIMULATION THERAPY: CPT | Performed by: PHYSICAL THERAPIST

## 2021-10-08 PROCEDURE — 97110 THERAPEUTIC EXERCISES: CPT | Performed by: PHYSICAL THERAPIST

## 2021-10-08 PROCEDURE — 97161 PT EVAL LOW COMPLEX 20 MIN: CPT | Performed by: PHYSICAL THERAPIST
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
## 2022-04-12 ENCOUNTER — DOCUMENTATION (OUTPATIENT)
Dept: PHYSICAL THERAPY | Facility: CLINIC | Age: 22
End: 2022-04-12

## 2022-04-12 NOTE — PROGRESS NOTES
Discharge Summary  Discharge Summary from Physical/Occupational Therapy Report    Patient: Jeremiah Cade   : 2000  Today's Date: 2022    Patient seen for 1 visit  Dates of Service: 10/8/21    Comments : Pt has not returned for additional therapy and will be DC from our services.      Thank you for this referral to [facility].    SIGNATURE: Jorge Delgadillo PT

## 2022-08-04 ENCOUNTER — OFFICE VISIT (OUTPATIENT)
Dept: FAMILY MEDICINE CLINIC | Facility: CLINIC | Age: 22
End: 2022-08-04

## 2022-08-04 VITALS
BODY MASS INDEX: 34.96 KG/M2 | DIASTOLIC BLOOD PRESSURE: 64 MMHG | OXYGEN SATURATION: 98 % | WEIGHT: 190 LBS | HEIGHT: 62 IN | SYSTOLIC BLOOD PRESSURE: 118 MMHG | HEART RATE: 68 BPM | TEMPERATURE: 97.7 F

## 2022-08-04 DIAGNOSIS — R11.0 NAUSEA: Primary | ICD-10-CM

## 2022-08-04 DIAGNOSIS — R42 DIZZY: ICD-10-CM

## 2022-08-04 DIAGNOSIS — H65.03 NON-RECURRENT ACUTE SEROUS OTITIS MEDIA OF BOTH EARS: ICD-10-CM

## 2022-08-04 LAB
B-HCG UR QL: NEGATIVE
EXPIRATION DATE: NORMAL
INTERNAL NEGATIVE CONTROL: NORMAL
INTERNAL POSITIVE CONTROL: NORMAL
Lab: NORMAL

## 2022-08-04 PROCEDURE — 81025 URINE PREGNANCY TEST: CPT | Performed by: NURSE PRACTITIONER

## 2022-08-04 PROCEDURE — 99213 OFFICE O/P EST LOW 20 MIN: CPT | Performed by: NURSE PRACTITIONER

## 2022-08-04 RX ORDER — FEXOFENADINE HCL 180 MG/1
180 TABLET ORAL DAILY
Qty: 30 TABLET | Refills: 0 | Status: SHIPPED | OUTPATIENT
Start: 2022-08-04 | End: 2022-09-01

## 2022-08-04 RX ORDER — ONDANSETRON 4 MG/1
4 TABLET, ORALLY DISINTEGRATING ORAL EVERY 8 HOURS PRN
Qty: 21 TABLET | Refills: 0 | Status: SHIPPED | OUTPATIENT
Start: 2022-08-04 | End: 2022-08-18 | Stop reason: SDUPTHER

## 2022-08-04 NOTE — PROGRESS NOTES
"Chief Complaint  Nausea    Subjective          Jeremiah Cade presents to [facility]      History of Present Illness    Jeremiah is a 22-year-old female patient of Dr. She Pickett who presents today with complaints of nausea and dizziness.    She has a past medical history of allergic rhinitis, PCOS, depression and anxiety, insomnia.  She tells me that she quit taking all of her medication.  She was having side effects with the Zoloft and the trazodone was causing her too much grogginess in the morning.  Additionally she stopped her Prilosec and her Flexeril.    She has me today that on Monday night she started with symptoms of nausea and dizziness.  The last 3 days have been worse.  She denies any fevers, chills, vomiting, diarrhea.  Stools are soft but not liquid.  She is with constant nausea whether she eats or not.  She denies any abdominal pain, chest pain, shortness of breath. She is lightheaded at today's visit. She reports her face has been feeling flushed. No headache or eye pain but reports \"pressure in back of head\". She has taken 4 home rapid Covid tests and all were negative.  She was concerned that her glucose might of been dropping so she checked her blood sugar at home and it was in the 90s each time.  Blood pressure is stable today at 118/64.  Patient afebrile.    On assessment, patient with bilateral serous otitis.  This is likely the culprit of her dizziness and the nausea is probably associated with the dizziness from the ear issue.  Going to treat patient with an antihistamine and Zofran.  She continues with symptoms, we can reevaluate and send an antibiotic if needed or assess blood work.          Objective     Vital Signs:   /64 (BP Location: Right arm, Patient Position: Sitting, Cuff Size: Adult)   Pulse 68   Temp 97.7 °F (36.5 °C) (Oral)   Ht 157.5 cm (62.01\")   Wt 86.2 kg (190 lb)   SpO2 98%   BMI 34.74 kg/m²           Physical Exam  Vitals " reviewed.   Constitutional:       Appearance: She is well-developed.      Comments: Wearing a face mask     HENT:      Head: Normocephalic and atraumatic.      Right Ear: Ear canal and external ear normal. Tenderness present. A middle ear effusion is present. There is no impacted cerumen. Tympanic membrane is not injected, erythematous or bulging.      Left Ear: Ear canal and external ear normal. Tenderness present. A middle ear effusion is present. There is no impacted cerumen. Tympanic membrane is not injected, erythematous or bulging.      Nose: Congestion present. No rhinorrhea.      Mouth/Throat:      Mouth: Mucous membranes are moist.      Pharynx: Oropharynx is clear. No oropharyngeal exudate or posterior oropharyngeal erythema.   Eyes:      Conjunctiva/sclera: Conjunctivae normal.   Cardiovascular:      Rate and Rhythm: Normal rate and regular rhythm.      Pulses: Normal pulses.      Heart sounds: Normal heart sounds.   Pulmonary:      Effort: Pulmonary effort is normal. No respiratory distress.      Breath sounds: Normal breath sounds.   Abdominal:      General: Bowel sounds are normal. There is no distension.      Palpations: Abdomen is soft. There is no mass.      Tenderness: There is no abdominal tenderness. There is no guarding or rebound.      Hernia: No hernia is present.   Musculoskeletal:         General: Normal range of motion.      Cervical back: Normal range of motion. No rigidity or tenderness.   Lymphadenopathy:      Head:      Right side of head: Posterior auricular adenopathy present. No submental, submandibular, tonsillar, preauricular or occipital adenopathy.      Left side of head: Posterior auricular adenopathy present. No submental, submandibular, tonsillar, preauricular or occipital adenopathy.      Cervical: No cervical adenopathy.   Skin:     General: Skin is warm and dry.      Findings: No rash.   Neurological:      Mental Status: She is alert and oriented to person, place, and time.    Psychiatric:         Behavior: Behavior normal.                Result Review :                                   Assessment and Plan      Diagnoses and all orders for this visit:    1. Nausea (Primary)  Assessment & Plan:  Urine pregnancy test negative in office today.  Patient is Noller taking her birth control.    Orders:  -     POC Pregnancy, Urine    2. Dizzy    3. Non-recurrent acute serous otitis media of both ears  Assessment & Plan:  No acute infection present.  Going to prescribe Allegra antihistamine as well as Zofran for the nausea.  Advised patient to reach out if her symptoms worsen or new symptoms develop.  We can also consider lab work in the future however, I did not think it was indicated today.      Other orders  -     ondansetron ODT (Zofran ODT) 4 MG disintegrating tablet; Place 1 tablet on the tongue Every 8 (Eight) Hours As Needed for Nausea or Vomiting.  Dispense: 21 tablet; Refill: 0  -     fexofenadine (Allegra Allergy) 180 MG tablet; Take 1 tablet by mouth Daily.  Dispense: 30 tablet; Refill: 0          Follow Up       No follow-ups on file.      Patient was given instructions and counseling regarding her condition or for health maintenance advice. Please see specific information pulled into the AVS if appropriate.     Yasmin Deluna, APRN8/4/202212:01 EDT  This note has been electronically signed

## 2022-08-04 NOTE — ASSESSMENT & PLAN NOTE
No acute infection present.  Going to prescribe Allegra antihistamine as well as Zofran for the nausea.  Advised patient to reach out if her symptoms worsen or new symptoms develop.  We can also consider lab work in the future however, I did not think it was indicated today.

## 2022-08-17 ENCOUNTER — TELEPHONE (OUTPATIENT)
Dept: FAMILY MEDICINE CLINIC | Facility: CLINIC | Age: 22
End: 2022-08-17

## 2022-08-17 NOTE — TELEPHONE ENCOUNTER
Caller: Jeremiah Cade    Relationship to patient: Self    Best call back number: [phone]    Patient is needing: PATIENT STATES SHE STILL HAS SAME SYMPTOMS THAT SHE HAD WHEN SHE SAW SYLVIE ON AUGUST 4. PATIENT STATES SYMPTOMS MAY BE A LITTLE MORE INTENSE  PLEASE ADVISE

## 2022-08-18 ENCOUNTER — OFFICE VISIT (OUTPATIENT)
Dept: FAMILY MEDICINE CLINIC | Facility: CLINIC | Age: 22
End: 2022-08-18

## 2022-08-18 VITALS
DIASTOLIC BLOOD PRESSURE: 60 MMHG | TEMPERATURE: 98.8 F | OXYGEN SATURATION: 99 % | WEIGHT: 189 LBS | HEART RATE: 78 BPM | BODY MASS INDEX: 34.78 KG/M2 | HEIGHT: 62 IN | SYSTOLIC BLOOD PRESSURE: 108 MMHG

## 2022-08-18 DIAGNOSIS — J01.00 ACUTE NON-RECURRENT MAXILLARY SINUSITIS: ICD-10-CM

## 2022-08-18 DIAGNOSIS — H92.09 EARACHE: Primary | ICD-10-CM

## 2022-08-18 LAB
EXPIRATION DATE: NORMAL
INTERNAL CONTROL: NORMAL
Lab: NORMAL
SARS-COV-2 AG UPPER RESP QL IA.RAPID: NOT DETECTED

## 2022-08-18 PROCEDURE — 87426 SARSCOV CORONAVIRUS AG IA: CPT | Performed by: NURSE PRACTITIONER

## 2022-08-18 PROCEDURE — 99213 OFFICE O/P EST LOW 20 MIN: CPT | Performed by: NURSE PRACTITIONER

## 2022-08-18 RX ORDER — ONDANSETRON 4 MG/1
4 TABLET, ORALLY DISINTEGRATING ORAL EVERY 8 HOURS PRN
Qty: 21 TABLET | Refills: 0 | Status: SHIPPED | OUTPATIENT
Start: 2022-08-18 | End: 2022-08-18

## 2022-08-18 RX ORDER — DOXYCYCLINE HYCLATE 100 MG/1
100 CAPSULE ORAL 2 TIMES DAILY
Qty: 14 CAPSULE | Refills: 0 | Status: SHIPPED | OUTPATIENT
Start: 2022-08-18 | End: 2022-09-22

## 2022-08-18 RX ORDER — ONDANSETRON 4 MG/1
4 TABLET, ORALLY DISINTEGRATING ORAL EVERY 8 HOURS PRN
Qty: 21 TABLET | Refills: 0 | Status: SHIPPED | OUTPATIENT
Start: 2022-08-18 | End: 2022-08-18 | Stop reason: SDUPTHER

## 2022-08-18 RX ORDER — DOXYCYCLINE HYCLATE 100 MG/1
100 CAPSULE ORAL 2 TIMES DAILY
Qty: 14 CAPSULE | Refills: 0 | Status: SHIPPED | OUTPATIENT
Start: 2022-08-18 | End: 2022-08-18

## 2022-08-18 RX ORDER — ONDANSETRON 4 MG/1
4 TABLET, ORALLY DISINTEGRATING ORAL EVERY 8 HOURS PRN
Qty: 21 TABLET | Refills: 0 | Status: SHIPPED | OUTPATIENT
Start: 2022-08-18 | End: 2022-09-22

## 2022-08-18 NOTE — ASSESSMENT & PLAN NOTE
Treating patient for sinusitis due to the facial pressure and discomfort.  Patient is with serous fluid in ears bilaterally which have been present for almost 2 weeks now.  Treating with doxycycline as patient does report yeast infection with penicillin antibiotics.  Patient to take Tylenol or Motrin for any pain fevers discomforts.  Encouraged fluid intake and rest.  Going to give patient the day off so she can return tomorrow

## 2022-08-18 NOTE — PROGRESS NOTES
"Chief Complaint  Earache    Subjective          Jeremiah Cade presents to [facility]      History of Present Illness    Jeremiah is a 22-year-old female patient of Dr. She Pickett who presents today for follow-up on earache.    Saw her about 2 weeks ago on 8/4/2022 for similar issue.  She was with earache and nausea.  She tells me she continues to feel unwell.  The Allegra she was unable to  until a few days ago but it seems to be doing nothing for her.  [facility] not [facility] the Zo[facility]an prescription so she was unable to pick that up.  She tells me earlier this week she began feeling worse.  She took a home COVID test and it was negative.  We did test her in office today and she was negative as well.  She tells me she is tired.  She remains nauseated.  Tuesday was the worst day with nausea and this time has passed she is willing more nauseous.  It is intermittent and she has been without vomiting or diarrhea.  She denies any fevers.  She does report she has been chilled and sweaty at times.  She has facial pressure and discomfort.  She tells me she gets flushed and hot throughout the day.          Objective     Vital Signs:   /60 (BP Location: Left arm, Patient Position: Sitting, Cuff Size: Adult)   Pulse 78   Temp 98.8 °F (37.1 °C) (Oral)   Ht 157.5 cm (62.01\")   Wt 85.7 kg (189 lb)   SpO2 99%   BMI 34.56 kg/m²           Physical Exam  Vitals reviewed.   Constitutional:       Appearance: She is well-developed.      Comments: Wearing a face mask     HENT:      Head: Normocephalic and atraumatic.      Right Ear: Hearing, ear canal and external ear normal. A middle ear effusion is present. There is no impacted cerumen. Tympanic membrane is not injected.      Left Ear: Hearing, ear canal and external ear normal. A middle ear effusion is present. There is no impacted cerumen. Tympanic membrane is not injected.      Nose: Congestion present. No rhinorrhea.      Right " Turbinates: Enlarged.      Left Turbinates: Enlarged.      Right Sinus: Maxillary sinus tenderness present. No frontal sinus tenderness.      Left Sinus: Maxillary sinus tenderness present. No frontal sinus tenderness.      Mouth/Throat:      Lips: Pink. No lesions.      Mouth: Mucous membranes are moist.      Tongue: No lesions.      Palate: No lesions.      Pharynx: Oropharynx is clear.      Comments: Clear PND noted  Eyes:      Conjunctiva/sclera: Conjunctivae normal.   Cardiovascular:      Rate and Rhythm: Normal rate and regular rhythm.      Pulses: Normal pulses.      Heart sounds: Normal heart sounds.   Pulmonary:      Effort: Pulmonary effort is normal. No respiratory distress.      Breath sounds: Normal breath sounds.   Abdominal:      General: Bowel sounds are normal.      Palpations: Abdomen is soft.   Musculoskeletal:         General: Normal range of motion.      Cervical back: Normal range of motion and neck supple. No tenderness.   Lymphadenopathy:      Head:      Right side of head: No submental, submandibular, tonsillar, preauricular, posterior auricular or occipital adenopathy.      Left side of head: No submental, submandibular, tonsillar, preauricular, posterior auricular or occipital adenopathy.      Cervical: No cervical adenopathy.   Skin:     General: Skin is warm and dry.      Findings: No rash.   Neurological:      Mental Status: She is alert and oriented to person, place, and time.   Psychiatric:         Behavior: Behavior normal.                Result Review :                                   Assessment and Plan      Diagnoses and all orders for this visit:    1. Earache (Primary)  -     POCT SILVIANO SARS-CoV-2 Antigen PRICILLA    2. Acute non-recurrent maxillary sinusitis  Assessment & Plan:  Treating patient for sinusitis due to the facial pressure and discomfort.  Patient is with serous fluid in ears bilaterally which have been present for almost 2 weeks now.  Treating with doxycycline as  patient does report yeast infection with penicillin antibiotics.  Patient to take Tylenol or Motrin for any pain fevers discomforts.  Encouraged fluid intake and rest.  Going to give patient the day off so she can return tomorrow      Other orders  -     Discontinue: doxycycline (VIBRAMYCIN) 100 MG capsule; Take 1 capsule by mouth 2 (Two) Times a Day.  Dispense: 14 capsule; Refill: 0  -     Discontinue: ondansetron ODT (Zofran ODT) 4 MG disintegrating tablet; Place 1 tablet on the tongue Every 8 (Eight) Hours As Needed for Nausea or Vomiting.  Dispense: 21 tablet; Refill: 0  -     Discontinue: doxycycline (VIBRAMYCIN) 100 MG capsule; Take 1 capsule by mouth 2 (Two) Times a Day.  Dispense: 14 capsule; Refill: 0  -     Discontinue: ondansetron ODT (Zofran ODT) 4 MG disintegrating tablet; Place 1 tablet on the tongue Every 8 (Eight) Hours As Needed for Nausea or Vomiting.  Dispense: 21 tablet; Refill: 0  -     doxycycline (VIBRAMYCIN) 100 MG capsule; Take 1 capsule by mouth 2 (Two) Times a Day.  Dispense: 14 capsule; Refill: 0  -     ondansetron ODT (Zofran ODT) 4 MG disintegrating tablet; Place 1 tablet on the tongue Every 8 (Eight) Hours As Needed for Nausea or Vomiting.  Dispense: 21 tablet; Refill: 0    Patient switched from [facility] to [facility] pharmacy.          Follow Up       No follow-ups on file.      Patient was given instructions and counseling regarding her condition or for health maintenance advice. Please see specific information pulled into the AVS if appropriate.     Yasmin Deluna, APRN8/18/202208:33 EDT  This note has been electronically signed

## 2022-09-01 RX ORDER — FEXOFENADINE HCL 180 MG/1
TABLET ORAL
Qty: 30 TABLET | Refills: 0 | Status: SHIPPED | OUTPATIENT
Start: 2022-09-01 | End: 2022-09-22

## 2022-09-22 ENCOUNTER — OFFICE VISIT (OUTPATIENT)
Dept: FAMILY MEDICINE CLINIC | Facility: CLINIC | Age: 22
End: 2022-09-22

## 2022-09-22 VITALS
WEIGHT: 188.4 LBS | BODY MASS INDEX: 34.67 KG/M2 | HEART RATE: 53 BPM | OXYGEN SATURATION: 98 % | HEIGHT: 62 IN | TEMPERATURE: 98.6 F | SYSTOLIC BLOOD PRESSURE: 128 MMHG | DIASTOLIC BLOOD PRESSURE: 80 MMHG

## 2022-09-22 DIAGNOSIS — Z86.19 HISTORY OF CLOSTRIDIUM DIFFICILE INFECTION: ICD-10-CM

## 2022-09-22 DIAGNOSIS — R19.7 DIARRHEA, UNSPECIFIED TYPE: Primary | ICD-10-CM

## 2022-09-22 PROCEDURE — 99213 OFFICE O/P EST LOW 20 MIN: CPT | Performed by: NURSE PRACTITIONER

## 2022-09-22 RX ORDER — SACCHAROMYCES BOULARDII 250 MG
250 CAPSULE ORAL 2 TIMES DAILY
Qty: 28 CAPSULE | Refills: 0 | Status: SHIPPED | OUTPATIENT
Start: 2022-09-22

## 2022-09-22 NOTE — ASSESSMENT & PLAN NOTE
Advised patient to avoid Imodium at this time due to her history of C. difficile and if she currently has C. difficile this can make infection much worse.  She is going to drop off a stool specimen today but took the container for home collection.    Sitting in [place] precautionary.  If patient's stool specimen returns, we will get treated with antibiotics.  However, advised patient that this may take several days and if any concerns she should go to the ER.    Until we get results, she should consider herself contagious.  If any family member develops diarrhea they should contact their doctor soon as possible for testing.  Patient and should take some probiotics for 2 weeks.  We will consider vancomycin therapy once we have testing back.

## 2022-09-22 NOTE — PROGRESS NOTES
"Chief Complaint  Diarrhea    Subjective          Jeremiah Cade presents to [facility]      Diarrhea     Jeremiah is a 22 year old female patient who presents today with diarrhea.     She tells me on Tuesday she was with \"softer stools\". She reports she has had a hx of C.diff previously back in March 2020. She is wanting to rule this out as her stools are now \"yellow and full liquid\". She recently completed her doxycycline but felt fine while taking it. Completed about two weeks ago. She is having cramps when she has to go to the bathroom. She has been to bathroom five times since five a.m. Anything she eats or drinks \"goes right through\". She is slightly nauseated with food and drink. She still has Zofran left over from last visit. Advised to use for her nausea.     She would like to take a stool specimen cup to take home & drop off later.         Objective     Vital Signs:   /80 (BP Location: Left arm, Patient Position: Sitting, Cuff Size: Adult)   Pulse 53   Temp 98.6 °F (37 °C) (Oral)   Ht 157.5 cm (62.01\")   Wt 85.5 kg (188 lb 6.4 oz)   SpO2 98%   BMI 34.45 kg/m²           Physical Exam  Vitals reviewed.   Constitutional:       Appearance: She is well-developed.      Comments: Wearing a face mask     HENT:      Head: Normocephalic and atraumatic.      Nose: Nose normal.      Mouth/Throat:      Mouth: Mucous membranes are moist.      Pharynx: Oropharynx is clear.   Eyes:      Conjunctiva/sclera: Conjunctivae normal.   Cardiovascular:      Rate and Rhythm: Normal rate and regular rhythm.      Pulses: Normal pulses.      Heart sounds: Normal heart sounds.   Pulmonary:      Effort: Pulmonary effort is normal.      Breath sounds: Normal breath sounds.   Abdominal:      General: Bowel sounds are normal. There is no distension.      Palpations: Abdomen is soft. There is no mass.      Tenderness: There is no abdominal tenderness. There is no guarding.   Musculoskeletal:    "      General: Normal range of motion.      Cervical back: Normal range of motion.   Skin:     General: Skin is warm and dry.      Findings: No rash.   Neurological:      Mental Status: She is alert and oriented to person, place, and time.   Psychiatric:         Behavior: Behavior normal.                Result Review :                                   Assessment and Plan      Diagnoses and all orders for this visit:    1. Diarrhea, unspecified type (Primary)  Assessment & Plan:  Advised patient to avoid Imodium at this time due to her history of C. difficile and if she currently has C. difficile this can make infection much worse.  She is going to drop off a stool specimen today but took the container for home collection.    Sitting in [place] precautionary.  If patient's stool specimen returns, we will get treated with antibiotics.  However, advised patient that this may take several days and if any concerns she should go to the ER.    Until we get results, she should consider herself contagious.  If any family member develops diarrhea they should contact their doctor soon as possible for testing.  Patient and should take some probiotics for 2 weeks.  We will consider vancomycin therapy once we have testing back.        2. History of Clostridium difficile infection  -     Clostridioides difficile EIA - Stool, Per Rectum          Follow Up       No follow-ups on file.      Patient was given instructions and counseling regarding her condition or for health maintenance advice. Please see specific information pulled into the AVS if appropriate.     Yasmin Deluna, APRN9/22/202211:50 EDT  This note has been electronically signed

## 2023-04-21 ENCOUNTER — OFFICE VISIT (OUTPATIENT)
Dept: FAMILY MEDICINE CLINIC | Facility: CLINIC | Age: 23
End: 2023-04-21
Payer: MEDICAID

## 2023-04-21 VITALS
DIASTOLIC BLOOD PRESSURE: 77 MMHG | HEART RATE: 66 BPM | TEMPERATURE: 97.3 F | SYSTOLIC BLOOD PRESSURE: 115 MMHG | BODY MASS INDEX: 34.23 KG/M2 | HEIGHT: 63 IN | RESPIRATION RATE: 18 BRPM | OXYGEN SATURATION: 97 % | WEIGHT: 193.2 LBS

## 2023-04-21 DIAGNOSIS — R23.2 VASOMOTOR FLUSHING: ICD-10-CM

## 2023-04-21 DIAGNOSIS — E28.2 PCOS (POLYCYSTIC OVARIAN SYNDROME): Primary | Chronic | ICD-10-CM

## 2023-04-21 DIAGNOSIS — R23.2 HOT FLASHES NOT DUE TO MENOPAUSE: ICD-10-CM

## 2023-04-21 PROBLEM — R19.7 DIARRHEA: Status: RESOLVED | Noted: 2022-09-22 | Resolved: 2023-04-21

## 2023-04-21 PROBLEM — J01.00 ACUTE NON-RECURRENT MAXILLARY SINUSITIS: Status: RESOLVED | Noted: 2022-08-18 | Resolved: 2023-04-21

## 2023-04-21 PROBLEM — H92.09 EARACHE: Status: RESOLVED | Noted: 2022-08-18 | Resolved: 2023-04-21

## 2023-04-21 PROBLEM — H65.03 NON-RECURRENT ACUTE SEROUS OTITIS MEDIA OF BOTH EARS: Status: RESOLVED | Noted: 2022-08-04 | Resolved: 2023-04-21

## 2023-04-21 PROBLEM — R11.0 NAUSEA: Status: RESOLVED | Noted: 2022-08-04 | Resolved: 2023-04-21

## 2023-04-21 PROCEDURE — 1159F MED LIST DOCD IN RCRD: CPT | Performed by: FAMILY MEDICINE

## 2023-04-21 PROCEDURE — T1015 CLINIC SERVICE: HCPCS | Performed by: FAMILY MEDICINE

## 2023-04-21 PROCEDURE — 1160F RVW MEDS BY RX/DR IN RCRD: CPT | Performed by: FAMILY MEDICINE

## 2023-04-21 PROCEDURE — 99214 OFFICE O/P EST MOD 30 MIN: CPT | Performed by: FAMILY MEDICINE

## 2023-04-21 NOTE — PROGRESS NOTES
"Alfredo   Jeremiah Cade is a 22 y.o. female.   Chief Complaint   Patient presents with   • Hot Flashes       History of Present Illness   Presents to the office today with a complaint of hot flashes.  I last saw her back in 21.  She reports she has been doing well.  She has not been sick.  She is not taking any routine medications.  She does not have any allergies.  Her only surgery has been to treat an abscess.    She reports that she is experiencing spells where she \"feels hot\".  Started maybe 6 months ago.  They last a few days at a time.  There when she wakes up, there when she goes to bed.  Not associated with eating, menstrual cycle.  Her blood pressure is never high.  She might flush a little and have some redness in her face when the spells happen but otherwise she denies trouble breathing during the spells, no palpitations, no nausea, vomiting, diarrhea.  She does not become diaphoretic.  She vapes, but does not smoke, drink, or use any other illegal drugs.  She works at [employer] here in town.  She has a history of C. difficile infections, but she is not having any abdominal pain, diarrhea.  She has a history of PCOS.  She follows with a gynecologist.  She has allergic rhinitis, but currently denies any association with the symptoms of nasal congestion.    She has a history of anxiety and depression, but she denies being depressed, anxious.  No panic attacks.      Patient Active Problem List    Diagnosis Date Noted   • Dizzy 08/04/2022   • History of Clostridium difficile infection 02/22/2021   • Hot flashes 08/14/2019   • Depression 07/09/2019   • Anxiety 07/09/2019   • Insomnia disorder 07/09/2019   • PCOS (polycystic ovarian syndrome) 07/09/2019   • Allergic rhinitis 12/09/2014   • Acne vulgaris 08/28/2012           Past Surgical History:   Procedure Laterality Date   • COLONOSCOPY     • COLONOSCOPY W/ BIOPSIES  07/29/2020    Dr. Magnus matos c/w erosions   • ENDOSCOPY  05/20/2021    Dr." "Magnus -ring at GE junction-dilated.  Gastric congestion consistent with gastritis.  Gastric / esophageal biopsies negative   • TOOTH EXTRACTION      x3     Current Outpatient Medications on File Prior to Visit   Medication Sig   • [DISCONTINUED] saccharomyces boulardii (Florastor) 250 MG capsule Take 1 capsule by mouth 2 (Two) Times a Day.     No current facility-administered medications on file prior to visit.     Allergies   Allergen Reactions   • Milk-Related Compounds Diarrhea and Nausea And Vomiting     Social History     Socioeconomic History   • Marital status:    Tobacco Use   • Smoking status: Every Day     Types: Electronic Cigarette     Passive exposure: Current   • Smokeless tobacco: Never   Vaping Use   • Vaping Use: Every day   • Substances: Nicotine   Substance and Sexual Activity   • Alcohol use: Yes     Comment: socially   • Drug use: No   • Sexual activity: Yes     Partners: Male     Birth control/protection: None     Family History   Problem Relation Age of Onset   • Hypertension Mother    • Drug abuse Mother         Narcotic abuse and illegal substances.   • Hypertension Father    • Heart attack Father    • Drug abuse Father         Narcotic abuse and illegal substances.   • Cervical cancer Maternal Grandmother    • Cancer Maternal Grandmother         Cervical cancer   • Diabetes Paternal Grandfather    • Heart attack Paternal Grandmother 40   • Birth defects Sister         Clubbed foot.   • Birth defects Brother         Clubbed foot.       Review of Systems    Objective   /77 (BP Location: Right arm, Patient Position: Sitting, Cuff Size: Adult)   Pulse 66   Temp 97.3 °F (36.3 °C) (Infrared)   Resp 18   Ht 159.5 cm (62.8\")   Wt 87.6 kg (193 lb 3.2 oz)   LMP 04/01/2023 (Exact Date)   SpO2 97%   Breastfeeding No   BMI 34.45 kg/m²   Physical Exam  Constitutional:       General: She is not in acute distress.     Appearance: She is well-developed. She is not ill-appearing or " toxic-appearing.      Comments:      HENT:      Head: Normocephalic and atraumatic.   Eyes:      Conjunctiva/sclera: Conjunctivae normal.   Neck:      Thyroid: No thyroid mass, thyromegaly or thyroid tenderness.   Cardiovascular:      Rate and Rhythm: Normal rate and regular rhythm.      Heart sounds: Normal heart sounds. No murmur heard.  Pulmonary:      Effort: Pulmonary effort is normal. No respiratory distress.      Breath sounds: Normal breath sounds.   Musculoskeletal:         General: Normal range of motion.      Cervical back: Normal range of motion.      Right lower leg: No edema.      Left lower leg: No edema.   Skin:     General: Skin is warm and dry.      Findings: No rash.   Neurological:      General: No focal deficit present.      Mental Status: She is alert and oriented to person, place, and time.      Gait: Gait normal.   Psychiatric:         Mood and Affect: Mood normal.         Behavior: Behavior normal.           No visits with results within 4 Month(s) from this visit.   Latest known visit with results is:   Office Visit on 08/18/2022   Component Date Value Ref Range Status   • SARS Antigen 08/18/2022 Not Detected  Not Detected, Presumptive Negative Final   • Internal Control 08/18/2022 Passed  Passed Final   • Lot Number 08/18/2022 [id number]   Final   • Expiration Date 08/18/2022 [id number]   Final         Assessment & Plan   Diagnoses and all orders for this visit:    1. PCOS (polycystic ovarian syndrome) (Primary)  -     TSH  -     Insulin, Random  -     Comprehensive Metabolic Panel  -     CBC & Differential  -     Estrogens, Total  -     Progesterone    2. Hot flashes not due to menopause  -     TSH  -     Insulin, Random  -     Comprehensive Metabolic Panel  -     CBC & Differential  -     Estrogens, Total  -     Progesterone    3. Vasomotor flushing  -     TSH  -     Insulin, Random  -     Comprehensive Metabolic Panel  -     CBC & Differential  -     Estrogens, Total  -      Progesterone    Unclear diagnosis at the moment.  This appears to not have a tremendously negative impact on her daily life.  She is still able to work, function normally.  We will do a work-up as above.  She does have a history of polycystic ovary syndrome.  She may have some insulin resistance.  We will do some hormone levels, TSH, insulin level.  I will follow-up with her when these test are back and we will proceed accordingly.  Keep follow-up with her gynecologist for routine well woman care.        Call with any problems or concerns before next visit       Return if symptoms worsen or fail to improve.      Much of this report is an electronic transcription of spoken language to printed text using Dragon dictation software.  As such, the subtleties and finesse of spoken language may permit erroneous, or at times, nonsensical words or phrases to be inadvertently transcribed; thus changes may be made at a later date to rectify these errors.     She Pickett MD4/21/202312:15 EDT  This note has been electronically signed

## 2023-04-22 LAB
ALBUMIN SERPL-MCNC: 4.5 G/DL (ref 3.9–5)
ALBUMIN/GLOB SERPL: 1.6 {RATIO} (ref 1.2–2.2)
ALP SERPL-CCNC: 79 IU/L (ref 44–121)
ALT SERPL-CCNC: 12 IU/L (ref 0–32)
AST SERPL-CCNC: 15 IU/L (ref 0–40)
BASOPHILS # BLD AUTO: 0.1 X10E3/UL (ref 0–0.2)
BASOPHILS NFR BLD AUTO: 1 %
BILIRUB SERPL-MCNC: 0.3 MG/DL (ref 0–1.2)
BUN SERPL-MCNC: 10 MG/DL (ref 6–20)
BUN/CREAT SERPL: 13 (ref 9–23)
CALCIUM SERPL-MCNC: 9.5 MG/DL (ref 8.7–10.2)
CHLORIDE SERPL-SCNC: 100 MMOL/L (ref 96–106)
CO2 SERPL-SCNC: 23 MMOL/L (ref 20–29)
CREAT SERPL-MCNC: 0.8 MG/DL (ref 0.57–1)
EGFRCR SERPLBLD CKD-EPI 2021: 105 ML/MIN/1.73
EOSINOPHIL # BLD AUTO: 0.6 X10E3/UL (ref 0–0.4)
EOSINOPHIL NFR BLD AUTO: 8 %
ERYTHROCYTE [DISTWIDTH] IN BLOOD BY AUTOMATED COUNT: 11.6 % (ref 11.7–15.4)
GLOBULIN SER CALC-MCNC: 2.8 G/DL (ref 1.5–4.5)
GLUCOSE SERPL-MCNC: 78 MG/DL (ref 70–99)
HCT VFR BLD AUTO: 41.5 % (ref 34–46.6)
HGB BLD-MCNC: 14.5 G/DL (ref 11.1–15.9)
IMM GRANULOCYTES # BLD AUTO: 0 X10E3/UL (ref 0–0.1)
IMM GRANULOCYTES NFR BLD AUTO: 0 %
LYMPHOCYTES # BLD AUTO: 1.5 X10E3/UL (ref 0.7–3.1)
LYMPHOCYTES NFR BLD AUTO: 18 %
MCH RBC QN AUTO: 30.1 PG (ref 26.6–33)
MCHC RBC AUTO-ENTMCNC: 34.9 G/DL (ref 31.5–35.7)
MCV RBC AUTO: 86 FL (ref 79–97)
MONOCYTES # BLD AUTO: 0.5 X10E3/UL (ref 0.1–0.9)
MONOCYTES NFR BLD AUTO: 6 %
NEUTROPHILS # BLD AUTO: 5.6 X10E3/UL (ref 1.4–7)
NEUTROPHILS NFR BLD AUTO: 67 %
PLATELET # BLD AUTO: 271 X10E3/UL (ref 150–450)
POTASSIUM SERPL-SCNC: 4.3 MMOL/L (ref 3.5–5.2)
PROGEST SERPL-MCNC: 11 NG/ML
PROT SERPL-MCNC: 7.3 G/DL (ref 6–8.5)
RBC # BLD AUTO: 4.81 X10E6/UL (ref 3.77–5.28)
SODIUM SERPL-SCNC: 137 MMOL/L (ref 134–144)
TSH SERPL DL<=0.005 MIU/L-ACNC: 1.36 UIU/ML (ref 0.45–4.5)
WBC # BLD AUTO: 8.2 X10E3/UL (ref 3.4–10.8)

## 2023-04-27 LAB — INSULIN SERPL-ACNC: 5.3 UIU/ML

## 2023-04-28 ENCOUNTER — OFFICE VISIT (OUTPATIENT)
Dept: FAMILY MEDICINE CLINIC | Facility: CLINIC | Age: 23
End: 2023-04-28
Payer: MEDICAID

## 2023-04-28 VITALS
HEART RATE: 69 BPM | WEIGHT: 194 LBS | TEMPERATURE: 98.4 F | BODY MASS INDEX: 34.38 KG/M2 | DIASTOLIC BLOOD PRESSURE: 71 MMHG | HEIGHT: 63 IN | OXYGEN SATURATION: 98 % | SYSTOLIC BLOOD PRESSURE: 114 MMHG

## 2023-04-28 DIAGNOSIS — K21.9 GASTROESOPHAGEAL REFLUX DISEASE, UNSPECIFIED WHETHER ESOPHAGITIS PRESENT: Primary | ICD-10-CM

## 2023-04-28 LAB — ESTROGEN SERPL-MCNC: 305 PG/ML

## 2023-04-28 RX ORDER — PANTOPRAZOLE SODIUM 20 MG/1
20 TABLET, DELAYED RELEASE ORAL DAILY
Qty: 30 TABLET | Refills: 0 | Status: SHIPPED | OUTPATIENT
Start: 2023-04-28

## 2023-04-28 NOTE — PROGRESS NOTES
"Chief Complaint  Abdominal Pain    Subjective          Jeremiah Cade presents to [facility]      History of Present Illness    Jeremiah is a 23-year-old male patient who presents today for ER follow up.     On Monday after eating lunch was with abdominal pain. Got worse and ended up getting nasuetad and with diarrhea so she went to [facility] ER. She was dx with GI bug and was given Zofran. She wasn't able to  script as there is insurance issues. She has kept to a bland diet. She currently feels better and has been taking omeprazole OTC which has helped greatly. She is taking once a day before food. She is having some intermittent constipation. She has appt to see I May 16, 2023 ENRIQUE Bustillo. She has a hx of GERD.  Patient reports she has had an upper and lower GI in the past.                    Objective     Vital Signs:   /71 (BP Location: Right arm, Patient Position: Sitting, Cuff Size: Adult)   Pulse 69   Temp 98.4 °F (36.9 °C) (Infrared)   Ht 159.5 cm (62.8\")   Wt 88 kg (194 lb)   SpO2 98%   BMI 34.59 kg/m²           Physical Exam  Vitals reviewed.   Constitutional:       Appearance: She is well-developed.      Comments: Wearing a face mask     HENT:      Head: Normocephalic and atraumatic.   Eyes:      Conjunctiva/sclera: Conjunctivae normal.   Cardiovascular:      Rate and Rhythm: Normal rate and regular rhythm.   Pulmonary:      Effort: Pulmonary effort is normal.   Musculoskeletal:         General: Normal range of motion.      Cervical back: Normal range of motion.   Skin:     General: Skin is warm and dry.      Findings: No rash.   Neurological:      Mental Status: She is alert and oriented to person, place, and time.   Psychiatric:         Behavior: Behavior normal.                Result Review :                                 Assessment and Plan      Diagnoses and all orders for this visit:    1. Gastroesophageal reflux disease, " unspecified whether esophagitis present (Primary)  -     pantoprazole (Protonix) 20 MG EC tablet; Take 1 tablet by mouth Daily.  Dispense: 30 tablet; Refill: 0      Patient feeling better from earlier this week.  Has a history of GERD.  She has not been on PPI for quite some time.  She did buy OTC PPI which has improved symptoms.  Going to send her prescription of pantoprazole in 20 mg daily.  Advised patient to keep her appointment with [facility]. Will request records from [facility] as I do not see any currently in the chart.  Advised patient if abdominal pain returns to go to [facility].          Follow Up       No follow-ups on file.      Patient was given instructions and counseling regarding her condition or for health maintenance advice. Please see specific information pulled into the AVS if appropriate.     Yasmin Deluna, APRN4/28/202314:07 EDT  This note has been electronically signed

## 2023-05-04 ENCOUNTER — TELEPHONE (OUTPATIENT)
Dept: FAMILY MEDICINE CLINIC | Facility: CLINIC | Age: 23
End: 2023-05-04
Payer: MEDICAID

## 2023-05-04 NOTE — TELEPHONE ENCOUNTER
Caller: Jeremiah Cade    Relationship: Self    Best call back number: [phone]     What is the medical concern/diagnosis: STOMACH ISSUES / POSSIBLE ULCER    What specialty or service is being requested: GASTROENTEROLOGY     What is the provider, practice or medical service name: ANY    What is the office location: ANY    What is the office phone number: ANY    Any additional details: PATIENT'S PREVIOUS GASTROENTEROLOGIST NO LONGER ACCEPTS HER INSURANCE.

## 2023-05-08 NOTE — TELEPHONE ENCOUNTER
CALLED PATIENT SHE IS GOING TO CALL HER INSURANCE AND THEM MESSAGE US BACK ABOUT WHO IS IN NETWORK WITH HER INSURANCE

## 2023-06-10 ENCOUNTER — HOSPITAL ENCOUNTER (OUTPATIENT)
Facility: HOSPITAL | Age: 23
Discharge: HOME | End: 2023-06-10
Attending: EMERGENCY MEDICINE
Payer: MEDICAID

## 2023-06-10 VITALS
WEIGHT: 190 LBS | OXYGEN SATURATION: 99 % | HEART RATE: 77 BPM | BODY MASS INDEX: 34.96 KG/M2 | RESPIRATION RATE: 18 BRPM | HEIGHT: 62 IN | DIASTOLIC BLOOD PRESSURE: 66 MMHG | SYSTOLIC BLOOD PRESSURE: 117 MMHG | TEMPERATURE: 98.6 F

## 2023-06-10 DIAGNOSIS — J06.9 UPPER RESPIRATORY TRACT INFECTION, UNSPECIFIED TYPE: ICD-10-CM

## 2023-06-10 DIAGNOSIS — H65.01 NON-RECURRENT ACUTE SEROUS OTITIS MEDIA OF RIGHT EAR: Primary | ICD-10-CM

## 2023-06-10 LAB — STREP A PCR: NOT DETECTED

## 2023-06-10 PROCEDURE — G0463 HOSPITAL OUTPT CLINIC VISIT: HCPCS | Performed by: EMERGENCY MEDICINE

## 2023-06-10 PROCEDURE — 87651 STREP A DNA AMP PROBE: CPT | Performed by: EMERGENCY MEDICINE

## 2023-06-10 RX ORDER — ONDANSETRON 4 MG/1
TABLET, ORALLY DISINTEGRATING ORAL
COMMUNITY
Start: 2023-04-24 | End: 2023-06-12

## 2023-06-10 RX ORDER — AZITHROMYCIN 250 MG/1
TABLET, FILM COATED ORAL
Qty: 6 TABLET | Refills: 0 | Status: SHIPPED | OUTPATIENT
Start: 2023-06-10 | End: 2023-06-12

## 2023-06-10 RX ORDER — PREDNISONE 20 MG/1
20 TABLET ORAL 2 TIMES DAILY
Qty: 10 TABLET | Refills: 0 | Status: SHIPPED | OUTPATIENT
Start: 2023-06-10 | End: 2023-06-15

## 2023-06-10 RX ORDER — FLUTICASONE PROPIONATE 50 MCG
2 SPRAY, SUSPENSION (ML) NASAL DAILY
Qty: 18.2 ML | Refills: 0 | Status: SHIPPED | OUTPATIENT
Start: 2023-06-10

## 2023-06-10 RX ORDER — DICYCLOMINE HCL 20 MG
20 TABLET ORAL EVERY 6 HOURS PRN
COMMUNITY
Start: 2023-04-24 | End: 2023-06-12

## 2023-06-10 NOTE — DISCHARGE INSTRUCTIONS
Please be advised most ear infections are viral in nature and antibiotics not needed.  Please monitor for ear pain if symptoms persist consider starting azithromycin for possible early bacterial ear infection.  Please take Flonase and prednisone as prescribed.  Gargle with warm salt water.  Drink plenty fluids to stay hydrated.  Seek immediate medical attention having worsening symptoms or any concerns.

## 2023-06-10 NOTE — FSED PROVIDER NOTE
Subjective   History of Present Illness  Patient 23-year-old woman who presents complaining of URI symptoms described as nasal congestion and runny nose with sneezing which began approximately 5 to 6 days ago.  Patient then developed throat discomfort which is mild to moderate.  She is having occasional laryngitis type symptoms with raspy voice but no difficulty breathing.  Patient developed right ear discomfort today.  No drainage.  Patient has had occasional cough with occasional sputum production but no shortness of breath.  No chest pain or abdominal pain.  No leg pain or leg swelling.  Patient denies any fevers.    Review of Systems    Past Medical History:   Diagnosis Date    Abdominal bloating     Anxiety     Depression     Eczema     Flatulence     GERD (gastroesophageal reflux disease)     Nausea     PONV (postoperative nausea and vomiting)     after tooth extraction       Allergies   Allergen Reactions    Milk-Related Compounds Diarrhea and Nausea And Vomiting       Past Surgical History:   Procedure Laterality Date    COLONOSCOPY      COLONOSCOPY W/ BIOPSIES  07/29/2020    Dr. Agudelo - bx c/w erosions    ENDOSCOPY  05/20/2021    Dr. Agudelo -ring at GE junction-dilated.  Gastric congestion consistent with gastritis.  Gastric / esophageal biopsies negative    TOOTH EXTRACTION      x3       Family History   Problem Relation Age of Onset    Hypertension Mother     Drug abuse Mother         Narcotic abuse and illegal substances.    Hypertension Father     Heart attack Father     Drug abuse Father         Narcotic abuse and illegal substances.    Cervical cancer Maternal Grandmother     Cancer Maternal Grandmother         Cervical cancer    Diabetes Paternal Grandfather     Heart attack Paternal Grandmother 40    Birth defects Sister         Clubbed foot.    Birth defects Brother         Clubbed foot.       Social History     Socioeconomic History    Marital status:    Tobacco Use    Smoking status:  Every Day     Types: Electronic Cigarette     Passive exposure: Current    Smokeless tobacco: Never   Vaping Use    Vaping Use: Every day    Substances: Nicotine    Devices: Disposable   Substance and Sexual Activity    Alcohol use: Yes     Comment: socially    Drug use: No    Sexual activity: Yes     Partners: Male     Birth control/protection: None           Objective   Physical Exam  Vitals and nursing note reviewed.   Constitutional:       General: She is not in acute distress.     Appearance: Normal appearance. She is not ill-appearing or toxic-appearing.   HENT:      Head: Normocephalic and atraumatic.      Right Ear: Ear canal and external ear normal.      Left Ear: Tympanic membrane, ear canal and external ear normal.      Ears:      Comments: Right ear examination with serous fluid without erythema to the tympanic membrane.  No tragal tenderness bilaterally.     Nose: Rhinorrhea present. No congestion.      Mouth/Throat:      Mouth: Mucous membranes are moist.      Pharynx: Oropharynx is clear. Posterior oropharyngeal erythema present. No oropharyngeal exudate.   Eyes:      Extraocular Movements: Extraocular movements intact.      Conjunctiva/sclera: Conjunctivae normal.      Pupils: Pupils are equal, round, and reactive to light.   Cardiovascular:      Rate and Rhythm: Normal rate and regular rhythm.      Pulses: Normal pulses.      Heart sounds: Normal heart sounds.   Pulmonary:      Effort: Pulmonary effort is normal. No respiratory distress.      Breath sounds: Normal breath sounds.   Abdominal:      General: Bowel sounds are normal.      Palpations: Abdomen is soft.      Tenderness: There is no abdominal tenderness.   Musculoskeletal:         General: No tenderness. Normal range of motion.      Cervical back: Normal range of motion and neck supple.   Lymphadenopathy:      Cervical: No cervical adenopathy.   Skin:     General: Skin is warm and dry.      Capillary Refill: Capillary refill takes less than  2 seconds.   Neurological:      General: No focal deficit present.      Mental Status: She is alert.      Sensory: No sensory deficit.      Motor: No weakness.       Procedures           ED Course                                           Medical Decision Making    Patient 23-year-old woman who presents complaining of URI symptoms which began approximate 5 to 6 days ago.  Symptoms all began with the patient describes as allergies with runny nose and nasal congestion and sneezing.  She then developed throat discomfort approximately 3 to 4 days ago.  She has been tolerating solids and liquids and her saliva.  She occasionally does have raspy voice consistent with laryngitis but not at this time.  She has had no difficulty breathing no shortness of breath.  Patient also developed right ear pain today.  No discharge.  Patient on examination does have rhinorrhea and there is pharyngeal erythema without exudates or swelling or no cervical lymphadenopathy.  No stridor.  Right ear examination does reveal serous fluid without erythema.  Lungs are clear to auscultation room air O2 sat 99% and pulse of 77.  The patient had a rapid strep test obtained.  Strep test is negative patient been advised throat discomfort is viral in nature.  Patient will be placed on prednisone.  Patient also be placed on Flonase to help with her nasal rhinorrhea symptoms.    Regarding right ear discomfort patient was advised that most ear infections are viral in nature.  There is serous fluid without erythema.  Patient will use a wait-and-see approach and has been provided with a prescription for azithromycin.  In the event that symptoms persist she may consider starting azithromycin for possible bacterial otitis media.  Patient voiced understanding this plan.        Final diagnoses:   Non-recurrent acute serous otitis media of right ear   Upper respiratory tract infection, unspecified type       ED Disposition  ED Disposition       ED Disposition    Discharge    Condition   Stable    Comment   --               She Pickett MD  [address]  [address]  [place] [address] [phone]    In 1 week      [place] [address] [address] [place] [id number] [phone]    If symptoms worsen         Medication List        New Prescriptions      azithromycin 250 MG tablet  Commonly known as: ZITHROMAX  Take 2 tabs on day 1, then 1 tab once a day days 2 through 5.     fluticasone 50 MCG/ACT nasal spray  Commonly known as: FLONASE  2 sprays into the nostril(s) as directed by provider Daily.     predniSONE 20 MG tablet  Commonly known as: DELTASONE  Take 1 tablet by mouth 2 (Two) Times a Day for 5 days.               Where to Get Your Medications        These medications were sent to [facility] [address] [place] [address], [place] - [address] [facility] - [phone]  - [phone] FX  [address] [facility] [place] [address]      Phone: [phone]   fluticasone 50 MCG/ACT nasal spray  predniSONE 20 MG tablet       You can get these medications from any pharmacy    Bring a paper prescription for each of these medications  azithromycin 250 MG tablet

## 2023-06-12 ENCOUNTER — OFFICE VISIT (OUTPATIENT)
Dept: FAMILY MEDICINE CLINIC | Facility: CLINIC | Age: 23
End: 2023-06-12
Payer: MEDICAID

## 2023-06-12 VITALS
BODY MASS INDEX: 35.7 KG/M2 | SYSTOLIC BLOOD PRESSURE: 111 MMHG | TEMPERATURE: 98.4 F | OXYGEN SATURATION: 98 % | WEIGHT: 194 LBS | DIASTOLIC BLOOD PRESSURE: 75 MMHG | HEART RATE: 72 BPM | HEIGHT: 62 IN

## 2023-06-12 DIAGNOSIS — R05.1 ACUTE COUGH: ICD-10-CM

## 2023-06-12 DIAGNOSIS — H65.111 NON-RECURRENT ACUTE ALLERGIC OTITIS MEDIA OF RIGHT EAR: Primary | ICD-10-CM

## 2023-06-12 PROCEDURE — 99213 OFFICE O/P EST LOW 20 MIN: CPT | Performed by: NURSE PRACTITIONER

## 2023-06-12 PROCEDURE — 1160F RVW MEDS BY RX/DR IN RCRD: CPT | Performed by: NURSE PRACTITIONER

## 2023-06-12 PROCEDURE — 1159F MED LIST DOCD IN RCRD: CPT | Performed by: NURSE PRACTITIONER

## 2023-06-12 PROCEDURE — T1015 CLINIC SERVICE: HCPCS | Performed by: NURSE PRACTITIONER

## 2023-06-12 RX ORDER — BROMPHENIRAMINE MALEATE, PSEUDOEPHEDRINE HYDROCHLORIDE, AND DEXTROMETHORPHAN HYDROBROMIDE 2; 30; 10 MG/5ML; MG/5ML; MG/5ML
5 SYRUP ORAL 4 TIMES DAILY PRN
Qty: 100 ML | Refills: 0 | Status: SHIPPED | OUTPATIENT
Start: 2023-06-12

## 2023-06-12 RX ORDER — DOXYCYCLINE HYCLATE 100 MG/1
100 CAPSULE ORAL 2 TIMES DAILY
Qty: 14 CAPSULE | Refills: 0 | Status: SHIPPED | OUTPATIENT
Start: 2023-06-12

## 2023-06-12 NOTE — PROGRESS NOTES
"Chief Complaint  Hoarse and Sore Throat    Subjective          Jeremiah Cade presents to [facility]      History of Present Illness    Jeremiah is a 23-year-old female patient of Dr. She Pickett who presents today with hoarseness and right otalgia.    She went to urgent care over the weekend and was prescribed azithromycin, Flonase, prednisone for URI and right otitis media. She did not  the atbx but did take the steroids has a few remaining.  She is less congested but still with nighttime cough, slightly fatigued, hoarse voice and right otalgia. She has been afebrile.  No N/V/D.      Objective     Vital Signs:   /75 (BP Location: Left arm, Patient Position: Sitting, Cuff Size: Adult)   Pulse 72   Temp 98.4 °F (36.9 °C) (Infrared)   Ht 157.5 cm (62.01\")   Wt 88 kg (194 lb)   SpO2 98%   BMI 35.47 kg/m²           Physical Exam  Vitals reviewed.   Constitutional:       Appearance: She is well-developed.      Comments: Wearing a face mask     HENT:      Head: Normocephalic and atraumatic.      Right Ear: Ear canal and external ear normal. Tenderness present. A middle ear effusion is present. Tympanic membrane is injected.      Left Ear: Hearing, tympanic membrane, ear canal and external ear normal. No tenderness.  No middle ear effusion. There is no impacted cerumen. Tympanic membrane is not injected.      Nose: Congestion and rhinorrhea present. Rhinorrhea is clear.      Mouth/Throat:      Lips: Pink. No lesions.      Mouth: Mucous membranes are moist.      Tongue: No lesions.      Palate: No lesions.      Pharynx: Oropharynx is clear. Uvula midline. Posterior oropharyngeal erythema present.      Tonsils: No tonsillar exudate or tonsillar abscesses.      Comments: Clear PND with cobblestoning  Eyes:      Conjunctiva/sclera: Conjunctivae normal.   Cardiovascular:      Rate and Rhythm: Normal rate and regular rhythm.      Pulses: Normal pulses.      Heart sounds: " Normal heart sounds.   Pulmonary:      Effort: Pulmonary effort is normal. No respiratory distress.      Breath sounds: Normal breath sounds.   Musculoskeletal:         General: Normal range of motion.      Cervical back: Normal range of motion.   Skin:     General: Skin is warm and dry.      Findings: No rash.   Neurological:      Mental Status: She is alert and oriented to person, place, and time.   Psychiatric:         Behavior: Behavior normal.                Result Review :                                   Assessment and Plan      Diagnoses and all orders for this visit:    1. Non-recurrent acute allergic otitis media of right ear (Primary)  -     doxycycline (VIBRAMYCIN) 100 MG capsule; Take 1 capsule by mouth 2 (Two) Times a Day.  Dispense: 14 capsule; Refill: 0    2. Acute cough  -     brompheniramine-pseudoephedrine-DM 30-2-10 MG/5ML syrup; Take 5 mL by mouth 4 (Four) Times a Day As Needed for Allergies, Cough or Congestion.  Dispense: 100 mL; Refill: 0      Will treat patient's otitis with doxycycline.  She does report she has had C. difficile before in the past with penicillin antibiotic.  For cough we will treat with Bromfed-DM.  Patient to continue steroid that she was prescribed.                Follow Up       No follow-ups on file.      Patient was given instructions and counseling regarding her condition or for health maintenance advice. Please see specific information pulled into the AVS if appropriate.     Yasmin Deluna, APRN6/12/202315:59 EDT  This note has been electronically signed

## 2023-07-19 ENCOUNTER — APPOINTMENT (OUTPATIENT)
Dept: ULTRASOUND IMAGING | Facility: HOSPITAL | Age: 23
End: 2023-07-19
Payer: MEDICAID

## 2023-07-19 ENCOUNTER — HOSPITAL ENCOUNTER (EMERGENCY)
Facility: HOSPITAL | Age: 23
Discharge: HOME | End: 2023-07-19
Attending: EMERGENCY MEDICINE | Admitting: EMERGENCY MEDICINE
Payer: MEDICAID

## 2023-07-19 VITALS
HEART RATE: 62 BPM | OXYGEN SATURATION: 100 % | TEMPERATURE: 99.7 F | DIASTOLIC BLOOD PRESSURE: 58 MMHG | BODY MASS INDEX: 35.46 KG/M2 | RESPIRATION RATE: 17 BRPM | SYSTOLIC BLOOD PRESSURE: 106 MMHG | WEIGHT: 192.68 LBS | HEIGHT: 62 IN

## 2023-07-19 DIAGNOSIS — O20.9 VAGINAL BLEEDING AFFECTING EARLY PREGNANCY: Primary | ICD-10-CM

## 2023-07-19 LAB
ALBUMIN SERPL-MCNC: 4.5 G/DL (ref 3.5–5.2)
ALBUMIN/GLOB SERPL: 1.7 G/DL
ALP SERPL-CCNC: 70 U/L (ref 39–117)
ALT SERPL W P-5'-P-CCNC: 12 U/L (ref 1–33)
ANION GAP SERPL CALCULATED.3IONS-SCNC: 13 MMOL/L (ref 5–15)
AST SERPL-CCNC: 15 U/L (ref 1–32)
BASOPHILS # BLD AUTO: 0.1 10*3/MM3 (ref 0–0.2)
BASOPHILS NFR BLD AUTO: 0.5 % (ref 0–1.5)
BILIRUB SERPL-MCNC: 0.4 MG/DL (ref 0–1.2)
BUN SERPL-MCNC: 7 MG/DL (ref 6–20)
BUN/CREAT SERPL: 12.1 (ref 7–25)
CALCIUM SPEC-SCNC: 9.2 MG/DL (ref 8.6–10.5)
CHLORIDE SERPL-SCNC: 102 MMOL/L (ref 98–107)
CO2 SERPL-SCNC: 21 MMOL/L (ref 22–29)
CREAT SERPL-MCNC: 0.58 MG/DL (ref 0.57–1)
DEPRECATED RDW RBC AUTO: 39.8 FL (ref 37–54)
EGFRCR SERPLBLD CKD-EPI 2021: 129 ML/MIN/1.73
EOSINOPHIL # BLD AUTO: 0.1 10*3/MM3 (ref 0–0.4)
EOSINOPHIL NFR BLD AUTO: 0.6 % (ref 0.3–6.2)
ERYTHROCYTE [DISTWIDTH] IN BLOOD BY AUTOMATED COUNT: 12.9 % (ref 12.3–15.4)
GLOBULIN UR ELPH-MCNC: 2.6 GM/DL
GLUCOSE SERPL-MCNC: 99 MG/DL (ref 65–99)
HCG INTACT+B SERPL-ACNC: NORMAL MIU/ML
HCT VFR BLD AUTO: 39.1 % (ref 34–46.6)
HGB BLD-MCNC: 13.3 G/DL (ref 12–15.9)
LYMPHOCYTES # BLD AUTO: 1.6 10*3/MM3 (ref 0.7–3.1)
LYMPHOCYTES NFR BLD AUTO: 15.5 % (ref 19.6–45.3)
MCH RBC QN AUTO: 30.5 PG (ref 26.6–33)
MCHC RBC AUTO-ENTMCNC: 34 G/DL (ref 31.5–35.7)
MCV RBC AUTO: 89.6 FL (ref 79–97)
MONOCYTES # BLD AUTO: 0.5 10*3/MM3 (ref 0.1–0.9)
MONOCYTES NFR BLD AUTO: 5.4 % (ref 5–12)
NEUTROPHILS NFR BLD AUTO: 7.9 10*3/MM3 (ref 1.7–7)
NEUTROPHILS NFR BLD AUTO: 78 % (ref 42.7–76)
NRBC BLD AUTO-RTO: 0 /100 WBC (ref 0–0.2)
PLATELET # BLD AUTO: 216 10*3/MM3 (ref 140–450)
PMV BLD AUTO: 10.4 FL (ref 6–12)
POTASSIUM SERPL-SCNC: 3.8 MMOL/L (ref 3.5–5.2)
PROT SERPL-MCNC: 7.1 G/DL (ref 6–8.5)
RBC # BLD AUTO: 4.37 10*6/MM3 (ref 3.77–5.28)
SODIUM SERPL-SCNC: 136 MMOL/L (ref 136–145)
WBC NRBC COR # BLD: 10.1 10*3/MM3 (ref 3.4–10.8)

## 2023-07-19 PROCEDURE — 85025 COMPLETE CBC W/AUTO DIFF WBC: CPT | Performed by: NURSE PRACTITIONER

## 2023-07-19 PROCEDURE — 84702 CHORIONIC GONADOTROPIN TEST: CPT | Performed by: NURSE PRACTITIONER

## 2023-07-19 PROCEDURE — 76801 OB US < 14 WKS SINGLE FETUS: CPT

## 2023-07-19 PROCEDURE — 80053 COMPREHEN METABOLIC PANEL: CPT | Performed by: NURSE PRACTITIONER

## 2023-07-19 PROCEDURE — 99283 EMERGENCY DEPT VISIT LOW MDM: CPT

## 2023-07-19 PROCEDURE — 76817 TRANSVAGINAL US OBSTETRIC: CPT

## 2023-07-19 PROCEDURE — 93976 VASCULAR STUDY: CPT

## 2023-07-19 RX ORDER — SODIUM CHLORIDE 0.9 % (FLUSH) 0.9 %
10 SYRINGE (ML) INJECTION AS NEEDED
Status: DISCONTINUED | OUTPATIENT
Start: 2023-07-19 | End: 2023-07-19 | Stop reason: HOSPADM

## 2023-07-19 NOTE — Clinical Note
[facility] EMERGENCY DEPARTMENT  [address] [facility] [address] [facility] [place] [address]  Phone: [phone]    Jeremiah Cade was seen and treated in our emergency department on 7/19/2023.  She may return to work on 07/20/2023.  Patient may lift nothing heavier than 10 pounds until cleared by the obstetrician       Thank you for choosing [facility].    Mendy Kapoor RN

## 2023-07-19 NOTE — DISCHARGE INSTRUCTIONS
Pelvic rest-do not put anything in the vagina no sex no toys no tampons    Push clear liquids    Do not lift anything greater than 10 pounds until cleared by the obstetrician    Return if worse

## 2023-07-19 NOTE — ED PROVIDER NOTES
Subjective   History of Present Illness  Patient is a 23-year-old obese female who comes in with vaginal bleeding in pregnancy she states that she was seen here last Friday she was given the RhoGAM shot at that time she states that she had a follow-up on Monday with Alex the nurse practitioner and Dr. Rogel's office she was put on pelvic rest at that time-she states that she was having dark blood in it became bright red blood today which brought her back to the emergency room.    Chart review shows that on 714 her hCG was 12,862 on Monday, July 17 it was 29,863 and today it was 37,680    G1, P0 Ab0    Review of Systems   Constitutional:  Negative for chills, fatigue and fever.   HENT:  Negative for congestion, tinnitus and trouble swallowing.    Eyes:  Negative for photophobia, discharge and redness.   Respiratory:  Negative for cough and shortness of breath.    Cardiovascular:  Negative for chest pain and palpitations.   Gastrointestinal:  Negative for abdominal pain, diarrhea, nausea and vomiting.   Genitourinary:  Positive for vaginal bleeding. Negative for dysuria, frequency and urgency.        Approximately 6 weeks pregnant   Musculoskeletal:  Negative for back pain, joint swelling and myalgias.   Skin:  Negative for rash.   Neurological:  Negative for dizziness and headaches.   Psychiatric/Behavioral:  Negative for confusion.    All other systems reviewed and are negative.    Past Medical History:   Diagnosis Date    Abdominal bloating     Anxiety     Depression     Eczema     Flatulence     GERD (gastroesophageal reflux disease)     Nausea     PONV (postoperative nausea and vomiting)     after tooth extraction       Allergies   Allergen Reactions    Milk-Related Compounds Diarrhea and Nausea And Vomiting       Past Surgical History:   Procedure Laterality Date    COLONOSCOPY      COLONOSCOPY W/ BIOPSIES  07/29/2020    Dr. Magnus matos c/w erosions    ENDOSCOPY  05/20/2021    Dr. Agudelo -nataliia at   junction-dilated.  Gastric congestion consistent with gastritis.  Gastric / esophageal biopsies negative    TOOTH EXTRACTION      x3       Family History   Problem Relation Age of Onset    Hypertension Mother     Drug abuse Mother         Narcotic abuse and illegal substances.    Hypertension Father     Heart attack Father     Drug abuse Father         Narcotic abuse and illegal substances.    Cervical cancer Maternal Grandmother     Cancer Maternal Grandmother         Cervical cancer    Diabetes Paternal Grandfather     Heart attack Paternal Grandmother 40    Birth defects Sister         Clubbed foot.    Birth defects Brother         Clubbed foot.       Social History     Socioeconomic History    Marital status:    Tobacco Use    Smoking status: Every Day     Types: Electronic Cigarette     Passive exposure: Current    Smokeless tobacco: Never   Vaping Use    Vaping Use: Every day    Substances: Nicotine    Devices: Disposable   Substance and Sexual Activity    Alcohol use: Yes     Comment: socially    Drug use: No    Sexual activity: Yes     Partners: Male     Birth control/protection: None           Objective   Physical Exam  Vitals reviewed.   Constitutional:       General: She is not in acute distress.     Appearance: Normal appearance. She is well-developed. She is obese. She is not ill-appearing, toxic-appearing or diaphoretic.   HENT:      Head: Normocephalic and atraumatic.   Eyes:      Conjunctiva/sclera: Conjunctivae normal.      Pupils: Pupils are equal, round, and reactive to light.   Cardiovascular:      Rate and Rhythm: Normal rate and regular rhythm.      Heart sounds: Normal heart sounds.   Pulmonary:      Effort: Pulmonary effort is normal. No respiratory distress.      Breath sounds: Normal breath sounds. No wheezing.   Abdominal:      General: Abdomen is protuberant. Bowel sounds are normal. There is no distension.      Palpations: Abdomen is soft. There is no mass.      Tenderness: There  "is no abdominal tenderness. There is no guarding or rebound.   Musculoskeletal:         General: No deformity. Normal range of motion.      Cervical back: Normal range of motion and neck supple.   Skin:     General: Skin is warm and dry.      Capillary Refill: Capillary refill takes less than 2 seconds.   Neurological:      General: No focal deficit present.      Mental Status: She is alert and oriented to person, place, and time.      GCS: GCS eye subscore is 4. GCS verbal subscore is 5. GCS motor subscore is 6.      Cranial Nerves: No cranial nerve deficit.      Sensory: No sensory deficit.      Deep Tendon Reflexes: Reflexes normal.   Psychiatric:         Attention and Perception: Attention normal.         Mood and Affect: Mood normal.         Speech: Speech normal.         Behavior: Behavior normal. Behavior is cooperative.       Procedures           ED Course  ED Course as of 07/19/23 1728   Wed Jul 19, 2023   1502 Patient going to ultrasound at this time [KW]   1659 Dr. Juan F day about 30 minutes ago waiting for return call [KW]      ED Course User Index  [KW] Sofia Coleman, APRN      /58 (BP Location: Left arm, Patient Position: Lying)   Pulse 62   Temp 99.7 °F (37.6 °C) (Oral)   Resp 17   Ht 157.5 cm (62\")   Wt 87.4 kg (192 lb 10.9 oz)   LMP 06/02/2023 (Exact Date)   SpO2 100%   BMI 35.24 kg/m²   Labs Reviewed   COMPREHENSIVE METABOLIC PANEL - Abnormal; Notable for the following components:       Result Value    CO2 21.0 (*)     All other components within normal limits    Narrative:     GFR Normal >60  Chronic Kidney Disease <60  Kidney Failure <15     CBC WITH AUTO DIFFERENTIAL - Abnormal; Notable for the following components:    Neutrophil % 78.0 (*)     Lymphocyte % 15.5 (*)     Neutrophils, Absolute 7.90 (*)     All other components within normal limits   HCG, QUANTITATIVE, PREGNANCY    Narrative:     HCG Ranges by Gestational Age    Females - non-pregnant premenopausal   </= " 1mIU/mL HCG  Females - postmenopausal               </= 7mIU/mL HCG    3 Weeks       5.4   -      72 mIU/mL  4 Weeks      10.2   -     708 mIU/mL  5 Weeks       217   -   8,245 mIU/mL  6 Weeks       152   -  32,177 mIU/mL  7 Weeks     4,059   - 153,767 mIU/mL  8 Weeks    31,366   - 149,094 mIU/mL  9 Weeks    59,109   - 135,901 mIU/mL  10 Weeks   44,186   - 170,409 mIU/mL  12 Weeks   27,107   - 201,615 mIU/mL  14 Weeks   24,302   -  93,646 mIU/mL  15 Weeks   12,540   -  69,747 mIU/mL  16 Weeks    8,904   -  55,332 mIU/mL  17 Weeks    8,240   -  51,793 mIU/mL  18 Weeks    9,649   -  55,271 mIU/mL     CBC AND DIFFERENTIAL    Narrative:     The following orders were created for panel order CBC & Differential.  Procedure                               Abnormality         Status                     ---------                               -----------         ------                     CBC Auto Differential[[id number]]        Abnormal            Final result                 Please view results for these tests on the individual orders.     US Ob Transvaginal   Final Result   1. Single living intrauterine pregnancy. Estimated gestational age 6 weeks 4 days +/-5 days. Recommend continued clinical follow-up.   2. Probable right corpus luteal cyst, otherwise unremarkable appearance of the ovary.   3. Nonvisualized left ovary due to overlying bowel gas.   4. Closed cervix.               Electronically Signed: Amadou Lagos MD     7/19/2023 3:57 PM EDT     Workstation ID: [id number]      US Ob < 14 Weeks Single or First Gestation   Final Result   1. Single living intrauterine pregnancy. Estimated gestational age 6 weeks 4 days +/-5 days. Recommend continued clinical follow-up.   2. Probable right corpus luteal cyst, otherwise unremarkable appearance of the ovary.   3. Nonvisualized left ovary due to overlying bowel gas.   4. Closed cervix.               Electronically Signed: Amadou Lagos MD     7/19/2023 3:57 PM EDT      Workstation ID: [id number]                                               Medical Decision Making  Patient is a 23-year-old obese female who comes in as a  Ab0 who has had continued vaginal bleeding in early pregnancy she is approximately 6 weeks 4 days pregnant and states she was seen here last Friday for vaginal bleeding and then received RhoGAM at that time she states that she then saw Alex the nurse practitioner for Dr. Rogel on Monday and had a reevaluation was told to have pelvic rest.  She states that the blood was brown and then became bright red again today which made her concerned and she called Dr. Rogel's office which was unable to get her in and they told her to come to the emergency room.  She states her bleeding is moderate she states she is used about 1 pad an hour and it is not saturated.  She denies any pain or cramping.  This is concerning for subchorionic hemorrhage or threatened  or molar pregnancy.  This is not an all-inclusive list.  The patient had IV established and blood work was obtained her hemoglobin and hematocrit were stable at 13.3 and 39.1.  Her CMP and CBC were all within normal limits.    The patient was found to have an elevating hCG today it was 37,680 and on Monday it was 29,863 the results of the ultrasound were interpreted by myself as well as the radiologist as being able to now see that there is an live intrauterine pregnancy with a fetal heart tones of 153 measuring about 6 weeks and 4 days which is consistent with the patient's dates.  The patient was told of the results of this I did initiate a call out to Dr. Rogel I called her twice with no return at this point I felt it was stable to discharge the patient home as she is going to see Dr. Rogel recheck on Friday which is 2 days from now for a another ultrasound and reevaluation we advised the patient to continue pelvic rest no lifting anything greater than 10 pounds and to make sure she sees Dr. Rogel on  Friday the patient verbalized understood discharge instructions and will taken home by her  at bedside.    Problems Addressed:  Vaginal bleeding affecting early pregnancy: complicated acute illness or injury    Amount and/or Complexity of Data Reviewed  External Data Reviewed: labs and notes.     Details: Reviewed previous notes labs in comparison to hCG levels today  Labs: ordered. Decision-making details documented in ED Course.  Radiology: ordered and independent interpretation performed. Decision-making details documented in ED Course.  ECG/medicine tests: ordered and independent interpretation performed. Decision-making details documented in ED Course.    Risk  OTC drugs.  Prescription drug management.        Final diagnoses:   Vaginal bleeding affecting early pregnancy       ED Disposition  ED Disposition       ED Disposition   Discharge    Condition   Stable    Comment   --               Ashley Rogel MD  [facility] [place] [facility]  [phone]      see Dr. Rogel on Friday as scheduled         Medication List        Stop      brompheniramine-pseudoephedrine-DM 30-2-10 MG/5ML syrup     doxycycline 100 MG capsule  Commonly known as: VIBRAMYCIN     fluticasone 50 MCG/ACT nasal spray  Commonly known as: Sofia Knutson, APRN  07/19/23 3779

## 2023-09-26 ENCOUNTER — REFERRAL TRIAGE (OUTPATIENT)
Dept: LABOR AND DELIVERY | Facility: HOSPITAL | Age: 23
End: 2023-09-26
Payer: MEDICAID

## 2023-09-26 ENCOUNTER — PATIENT OUTREACH (OUTPATIENT)
Dept: LABOR AND DELIVERY | Facility: HOSPITAL | Age: 23
End: 2023-09-26
Payer: MEDICAID

## 2023-10-03 ENCOUNTER — PATIENT OUTREACH (OUTPATIENT)
Dept: LABOR AND DELIVERY | Facility: HOSPITAL | Age: 23
End: 2023-10-03
Payer: MEDICAID

## 2023-10-03 NOTE — OUTREACH NOTE
[facility]  Unable to Reach       Questions/Answers      Flowsheet Row Responses   Pending Outreach Confirm Patient Interest   Call Attempt Second   Outcome Not available, Left message, CEYXhart message sent to patient   Next Call Attempt Date 10/06/23                Ashley Noriega RN  Maternity Nurse Navigator    10/3/2023, 17:54 EDT

## 2023-10-05 ENCOUNTER — PATIENT OUTREACH (OUTPATIENT)
Dept: LABOR AND DELIVERY | Facility: HOSPITAL | Age: 23
End: 2023-10-05
Payer: MEDICAID

## 2023-10-05 NOTE — OUTREACH NOTE
[facility]  Unable to Reach       Questions/Answers      Flowsheet Row Responses   Pending Outreach Confirm Patient Interest   Call Attempt Third   Outcome Left message            Contact information provided     Ashley Noriega RN  Maternity Nurse Navigator    10/5/2023, 16:31 EDT

## 2023-10-10 ENCOUNTER — HOSPITAL ENCOUNTER (EMERGENCY)
Facility: HOSPITAL | Age: 23
Discharge: HOME | End: 2023-10-11
Attending: EMERGENCY MEDICINE | Admitting: EMERGENCY MEDICINE
Payer: COMMERCIAL
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
## 2023-10-10 DIAGNOSIS — Z3A.19 19 WEEKS GESTATION OF PREGNANCY: ICD-10-CM

## 2023-10-10 DIAGNOSIS — R10.30 LOWER ABDOMINAL PAIN: ICD-10-CM

## 2023-10-10 LAB
ABO GROUP BLD: NORMAL
ALBUMIN SERPL-MCNC: 3.8 G/DL (ref 3.5–5.2)
ALBUMIN/GLOB SERPL: 1.1 G/DL
ALP SERPL-CCNC: 68 U/L (ref 39–117)
ALT SERPL W P-5'-P-CCNC: 9 U/L (ref 1–33)
ANION GAP SERPL CALCULATED.3IONS-SCNC: 8 MMOL/L (ref 5–15)
AST SERPL-CCNC: 12 U/L (ref 1–32)
BASOPHILS # BLD AUTO: 0.1 10*3/MM3 (ref 0–0.2)
BASOPHILS NFR BLD AUTO: 0.5 % (ref 0–1.5)
BILIRUB SERPL-MCNC: <0.2 MG/DL (ref 0–1.2)
BUN SERPL-MCNC: 8 MG/DL (ref 6–20)
BUN/CREAT SERPL: 15.4 (ref 7–25)
CALCIUM SPEC-SCNC: 9.6 MG/DL (ref 8.6–10.5)
CHLORIDE SERPL-SCNC: 102 MMOL/L (ref 98–107)
CO2 SERPL-SCNC: 25 MMOL/L (ref 22–29)
CREAT SERPL-MCNC: 0.52 MG/DL (ref 0.57–1)
DEPRECATED RDW RBC AUTO: 45.1 FL (ref 37–54)
EGFRCR SERPLBLD CKD-EPI 2021: 132.4 ML/MIN/1.73
EOSINOPHIL # BLD AUTO: 0.1 10*3/MM3 (ref 0–0.4)
EOSINOPHIL NFR BLD AUTO: 0.7 % (ref 0.3–6.2)
ERYTHROCYTE [DISTWIDTH] IN BLOOD BY AUTOMATED COUNT: 13.6 % (ref 12.3–15.4)
GLOBULIN UR ELPH-MCNC: 3.4 GM/DL
GLUCOSE SERPL-MCNC: 83 MG/DL (ref 65–99)
HCG INTACT+B SERPL-ACNC: NORMAL MIU/ML
HCT VFR BLD AUTO: 35.8 % (ref 34–46.6)
HGB BLD-MCNC: 12.2 G/DL (ref 12–15.9)
HGB F BLD QL KLEIH BETKE: NORMAL
LYMPHOCYTES # BLD AUTO: 1.4 10*3/MM3 (ref 0.7–3.1)
LYMPHOCYTES NFR BLD AUTO: 12 % (ref 19.6–45.3)
MCH RBC QN AUTO: 30.8 PG (ref 26.6–33)
MCHC RBC AUTO-ENTMCNC: 34.1 G/DL (ref 31.5–35.7)
MCV RBC AUTO: 90.2 FL (ref 79–97)
MONOCYTES # BLD AUTO: 0.6 10*3/MM3 (ref 0.1–0.9)
MONOCYTES NFR BLD AUTO: 5 % (ref 5–12)
NEUTROPHILS NFR BLD AUTO: 81.8 % (ref 42.7–76)
NEUTROPHILS NFR BLD AUTO: 9.6 10*3/MM3 (ref 1.7–7)
NRBC BLD AUTO-RTO: 0.1 /100 WBC (ref 0–0.2)
NUMBER OF DOSES: NORMAL
PLATELET # BLD AUTO: 199 10*3/MM3 (ref 140–450)
PMV BLD AUTO: 11.1 FL (ref 6–12)
POTASSIUM SERPL-SCNC: 4 MMOL/L (ref 3.5–5.2)
PROT SERPL-MCNC: 7.2 G/DL (ref 6–8.5)
RBC # BLD AUTO: 3.97 10*6/MM3 (ref 3.77–5.28)
RH BLD: NEGATIVE
SODIUM SERPL-SCNC: 135 MMOL/L (ref 136–145)
WBC NRBC COR # BLD: 11.7 10*3/MM3 (ref 3.4–10.8)

## 2023-10-10 PROCEDURE — 85460 HEMOGLOBIN FETAL: CPT | Performed by: PHYSICIAN ASSISTANT

## 2023-10-10 PROCEDURE — 86901 BLOOD TYPING SEROLOGIC RH(D): CPT | Performed by: PHYSICIAN ASSISTANT

## 2023-10-10 PROCEDURE — 99284 EMERGENCY DEPT VISIT MOD MDM: CPT

## 2023-10-10 PROCEDURE — 85025 COMPLETE CBC W/AUTO DIFF WBC: CPT | Performed by: PHYSICIAN ASSISTANT

## 2023-10-10 PROCEDURE — 84702 CHORIONIC GONADOTROPIN TEST: CPT | Performed by: PHYSICIAN ASSISTANT

## 2023-10-10 PROCEDURE — 80053 COMPREHEN METABOLIC PANEL: CPT | Performed by: PHYSICIAN ASSISTANT

## 2023-10-10 PROCEDURE — 86850 RBC ANTIBODY SCREEN: CPT | Performed by: PHYSICIAN ASSISTANT

## 2023-10-10 PROCEDURE — 86900 BLOOD TYPING SEROLOGIC ABO: CPT | Performed by: PHYSICIAN ASSISTANT

## 2023-10-10 PROCEDURE — 86870 RBC ANTIBODY IDENTIFICATION: CPT | Performed by: PHYSICIAN ASSISTANT

## 2023-10-10 RX ORDER — SODIUM CHLORIDE 0.9 % (FLUSH) 0.9 %
10 SYRINGE (ML) INJECTION AS NEEDED
Status: DISCONTINUED | OUTPATIENT
Start: 2023-10-10 | End: 2023-10-11 | Stop reason: HOSPADM

## 2023-10-10 NOTE — Clinical Note
[facility] EMERGENCY DEPARTMENT  [address] [place] [address]  Phone: [phone]    Jeremiah Cade was seen and treated in our emergency department on 10/10/2023.  She may return to work on 10/15/2023.         Thank you for choosing [facility].    Travis Cazares PA

## 2023-10-11 ENCOUNTER — APPOINTMENT (OUTPATIENT)
Dept: ULTRASOUND IMAGING | Facility: HOSPITAL | Age: 23
End: 2023-10-11
Payer: COMMERCIAL

## 2023-10-11 VITALS
OXYGEN SATURATION: 100 % | BODY MASS INDEX: 36.07 KG/M2 | SYSTOLIC BLOOD PRESSURE: 108 MMHG | WEIGHT: 196 LBS | DIASTOLIC BLOOD PRESSURE: 63 MMHG | TEMPERATURE: 98.2 F | HEIGHT: 62 IN | RESPIRATION RATE: 16 BRPM | HEART RATE: 67 BPM

## 2023-10-11 LAB
BLD GP AB SCN SERPL QL: POSITIVE
RESIDUAL RHIG DETECTED: NORMAL

## 2023-10-11 PROCEDURE — 76805 OB US >/= 14 WKS SNGL FETUS: CPT

## 2023-10-11 PROCEDURE — 96372 THER/PROPH/DIAG INJ SC/IM: CPT

## 2023-10-11 PROCEDURE — 25010000002 RHO D IMMUNE GLOBULIN 1500 UNIT/2ML SOLUTION PREFILLED SYRINGE: Performed by: PHYSICIAN ASSISTANT

## 2023-10-11 RX ORDER — ACETAMINOPHEN 500 MG
1000 TABLET ORAL ONCE
Status: COMPLETED | OUTPATIENT
Start: 2023-10-11 | End: 2023-10-11

## 2023-10-11 RX ADMIN — ACETAMINOPHEN 1000 MG: 500 TABLET, FILM COATED ORAL at 00:34

## 2023-10-11 RX ADMIN — HUMAN RHO(D) IMMUNE GLOBULIN 300 MCG: 1500 SOLUTION INTRAMUSCULAR; INTRAVENOUS at 01:15
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
## 2023-10-11 NOTE — DISCHARGE INSTRUCTIONS
Tylenol as needed for pain.  Plenty of rest.    Follow-up with your primary care provider in 3-5 days.  If you do not have a primary care provider call [phone] for help in finding one, or you may follow up with [facility] at [phone].    Follow-up with your OB/GYN for further management    Return to ED for any new or worsening symptoms

## 2024-01-17 ENCOUNTER — HOSPITAL ENCOUNTER (OUTPATIENT)
Facility: HOSPITAL | Age: 24
Discharge: HOME | End: 2024-01-17
Attending: EMERGENCY MEDICINE | Admitting: EMERGENCY MEDICINE
Payer: MEDICAID

## 2024-01-17 VITALS
HEIGHT: 62 IN | WEIGHT: 207 LBS | DIASTOLIC BLOOD PRESSURE: 79 MMHG | OXYGEN SATURATION: 98 % | TEMPERATURE: 98.3 F | RESPIRATION RATE: 20 BRPM | SYSTOLIC BLOOD PRESSURE: 134 MMHG | HEART RATE: 81 BPM | BODY MASS INDEX: 38.09 KG/M2

## 2024-01-17 DIAGNOSIS — U07.1 COVID-19: Primary | ICD-10-CM

## 2024-01-17 LAB
FLUAV SUBTYP SPEC NAA+PROBE: NOT DETECTED
FLUBV RNA ISLT QL NAA+PROBE: NOT DETECTED
SARS-COV-2 RNA RESP QL NAA+PROBE: DETECTED
STREP A PCR: NOT DETECTED

## 2024-01-17 PROCEDURE — 87651 STREP A DNA AMP PROBE: CPT | Performed by: EMERGENCY MEDICINE

## 2024-01-17 PROCEDURE — G0463 HOSPITAL OUTPT CLINIC VISIT: HCPCS | Performed by: EMERGENCY MEDICINE

## 2024-01-17 PROCEDURE — 87636 SARSCOV2 & INF A&B AMP PRB: CPT | Performed by: EMERGENCY MEDICINE

## 2024-01-17 RX ORDER — NIRMATRELVIR AND RITONAVIR 300-100 MG
3 KIT ORAL 2 TIMES DAILY
Qty: 30 TABLET | Refills: 0 | Status: SHIPPED | OUTPATIENT
Start: 2024-01-17 | End: 2024-01-22

## 2024-01-17 NOTE — FSED PROVIDER NOTE
Subjective   History of Present Illness  23-year-old  female presents to emergency department for cough, fever, congestion, ear pain.  Symptoms began approximately 2 days prior.  Patient denies any chest pain or shortness of breath.  Patient denies any nausea, vomiting, diarrhea.  No headache, neck pain, photophobia.  No altered mental status.  No joint pain.  No petechial rash.      Review of Systems   All other systems reviewed and are negative.      Past Medical History:   Diagnosis Date    Abdominal bloating     Anxiety     Depression     Eczema     Flatulence     GERD (gastroesophageal reflux disease)     Nausea     PONV (postoperative nausea and vomiting)     after tooth extraction       Allergies   Allergen Reactions    Milk-Related Compounds Diarrhea and Nausea And Vomiting       Past Surgical History:   Procedure Laterality Date    COLONOSCOPY      COLONOSCOPY W/ BIOPSIES  07/29/2020    Dr. Agudelo - bx c/w erosions    ENDOSCOPY  05/20/2021    Dr. Agudelo -ring at GE junction-dilated.  Gastric congestion consistent with gastritis.  Gastric / esophageal biopsies negative    TOOTH EXTRACTION      x3       Family History   Problem Relation Age of Onset    Hypertension Mother     Drug abuse Mother         Narcotic abuse and illegal substances.    Hypertension Father     Heart attack Father     Drug abuse Father         Narcotic abuse and illegal substances.    Cervical cancer Maternal Grandmother     Cancer Maternal Grandmother         Cervical cancer    Diabetes Paternal Grandfather     Heart attack Paternal Grandmother 40    Birth defects Sister         Clubbed foot.    Birth defects Brother         Clubbed foot.       Social History     Socioeconomic History    Marital status:    Tobacco Use    Smoking status: Every Day     Types: Electronic Cigarette     Passive exposure: Current    Smokeless tobacco: Never   Vaping Use    Vaping Use: Every day    Substances: Nicotine    Devices: Disposable    Substance and Sexual Activity    Alcohol use: Yes     Comment: socially    Drug use: No    Sexual activity: Yes     Partners: Male     Birth control/protection: None           Objective   Physical Exam  Vitals and nursing note reviewed.   Constitutional:       General: She is not in acute distress.     Appearance: Normal appearance. She is normal weight. She is not ill-appearing.   HENT:      Head: Normocephalic and atraumatic.      Right Ear: External ear normal.      Left Ear: External ear normal.      Nose: Nose normal. Congestion present.      Mouth/Throat:      Mouth: Mucous membranes are moist.      Pharynx: Oropharynx is clear.   Eyes:      Conjunctiva/sclera: Conjunctivae normal.      Pupils: Pupils are equal, round, and reactive to light.   Cardiovascular:      Rate and Rhythm: Normal rate.      Pulses: Normal pulses.   Pulmonary:      Effort: Pulmonary effort is normal.   Abdominal:      General: Abdomen is flat.   Musculoskeletal:         General: Normal range of motion.      Cervical back: Normal range of motion.   Skin:     General: Skin is warm.      Capillary Refill: Capillary refill takes less than 2 seconds.   Neurological:      General: No focal deficit present.      Mental Status: She is alert.         Procedures           ED Course                                           Medical Decision Making  Cough, congestion and a 23-year-old  female approximately 33 weeks gestation.  Patient tested positive for COVID-19.  Patient with no shortness of breath, chest pain, saturating 98%, completely clinically stable.  I reviewed ACOG recommendations with the patient and independently.  The recommendations are to treat with Paxlovid.    Problems Addressed:  COVID-19: complicated acute illness or injury    Risk  Prescription drug management.        Final diagnoses:   COVID-19       ED Disposition  ED Disposition       ED Disposition   Discharge    Condition   Stable    Comment   --                She Pickett MD  [address]  [place] [address]  [place] [address] [phone]    Schedule an appointment as soon as possible for a visit       [place] [address] [address] [place] [address] [phone]    If symptoms worsen         Medication List        New Prescriptions      Paxlovid (300/100) 20 x 150 MG & 10 x 100MG tablet therapy pack tablet  Generic drug: Nirmatrelvir & Ritonavir (300mg/100mg)  Take 3 tablets by mouth 2 (Two) Times a Day for 5 days.               Where to Get Your Medications        These medications were sent to [facility] [address] [place] [address], [place] - [address] [facility] - [phone]  - [phone] FX  [address] [facility] [place] [address]      Phone: [phone]   Paxlovid (300/100) 20 x 150 MG & 10 x 100MG tablet therapy pack tablet

## 2024-04-11 ENCOUNTER — TELEPHONE (OUTPATIENT)
Dept: FAMILY MEDICINE CLINIC | Facility: CLINIC | Age: 24
End: 2024-04-11
Payer: COMMERCIAL

## 2024-04-11 NOTE — TELEPHONE ENCOUNTER
Pt has been dizzy/light headed for two weeks since having her baby.  Had a postpartum visit with OB yesterday.  BP was low 107/75.  OB states that might be why she is dizzy.  They told her to f/u with PCP.  Pt is nursing, so she is drinking plenty of fluids.      Appt made with NP on 4/15/24

## 2024-04-15 ENCOUNTER — OFFICE VISIT (OUTPATIENT)
Dept: FAMILY MEDICINE CLINIC | Facility: CLINIC | Age: 24
End: 2024-04-15
Payer: MEDICAID

## 2024-04-15 VITALS
SYSTOLIC BLOOD PRESSURE: 106 MMHG | BODY MASS INDEX: 36.25 KG/M2 | DIASTOLIC BLOOD PRESSURE: 71 MMHG | TEMPERATURE: 97.7 F | HEART RATE: 63 BPM | WEIGHT: 197 LBS | HEIGHT: 62 IN | OXYGEN SATURATION: 97 %

## 2024-04-15 DIAGNOSIS — D50.9 IRON DEFICIENCY ANEMIA, UNSPECIFIED IRON DEFICIENCY ANEMIA TYPE: ICD-10-CM

## 2024-04-15 DIAGNOSIS — R42 DIZZY: Primary | ICD-10-CM

## 2024-04-15 PROCEDURE — 1160F RVW MEDS BY RX/DR IN RCRD: CPT | Performed by: NURSE PRACTITIONER

## 2024-04-15 PROCEDURE — T1015 CLINIC SERVICE: HCPCS | Performed by: NURSE PRACTITIONER

## 2024-04-15 PROCEDURE — 1159F MED LIST DOCD IN RCRD: CPT | Performed by: NURSE PRACTITIONER

## 2024-04-15 PROCEDURE — 99213 OFFICE O/P EST LOW 20 MIN: CPT | Performed by: NURSE PRACTITIONER

## 2024-04-15 NOTE — PROGRESS NOTES
"Chief Complaint  Hypotension and Dizziness        Jeremiah Cade presents to [facility] INTERNAL MEDICINE        Subjective        23-year-old female patient of Dr. She Pickett presents today with complaints of  dizziness postpartum.  Delivery 2/18/2024.    Feeling dizzy off and on as of two weeks ago. Lasts for about 1-2 minutes. Sometimes will occur with rest, with activity. No symptoms lying down. Denies visual changes, CP, headaches, SOB or tinnitus. Eating drinking normally. Blood pressure 106/71 - this is close to her baseline. No issues before or during delivery.  Occurs at least once daily. Last week followed up with OBGYN was with low iron in past and followed by OBGYN. Does feel fatigued but recently delivered. Could not tolerate iron nor could infant due to diarrhea it caused both.                     Objective         Vital Signs:     /71 (BP Location: Right arm, Patient Position: Sitting, Cuff Size: Adult)   Pulse 63   Temp 97.7 °F (36.5 °C) (Infrared)   Ht 157.5 cm (62.01\")   Wt 89.4 kg (197 lb)   SpO2 97%   BMI 36.02 kg/m²       Physical Exam  Vitals reviewed.   Constitutional:       Appearance: She is well-developed. She is obese.      Comments:      HENT:      Head: Normocephalic and atraumatic.      Nose: Nose normal.      Mouth/Throat:      Mouth: Mucous membranes are moist.      Pharynx: Oropharynx is clear.   Eyes:      Conjunctiva/sclera: Conjunctivae normal.      Pupils: Pupils are equal, round, and reactive to light.   Cardiovascular:      Rate and Rhythm: Normal rate and regular rhythm.      Pulses: Normal pulses.   Pulmonary:      Effort: Pulmonary effort is normal. No respiratory distress.      Breath sounds: Normal breath sounds. No stridor. No wheezing, rhonchi or rales.   Chest:      Chest wall: No tenderness.   Musculoskeletal:         General: Normal range of motion.      Cervical back: Normal range of motion.   Skin:     General: Skin is warm and " dry.      Coloration: Skin is pale.      Findings: No rash.   Neurological:      Mental Status: She is alert and oriented to person, place, and time.   Psychiatric:         Behavior: Behavior normal.                History of Present Illness      Patient Active Problem List   Diagnosis    Acne vulgaris    Allergic rhinitis    Depression    Anxiety    Insomnia disorder    PCOS (polycystic ovarian syndrome)    Hot flashes    History of Clostridium difficile infection    Dizzy    Gastroesophageal reflux disease    Non-recurrent acute allergic otitis media of right ear    Iron deficiency anemia         Past Medical History:   Diagnosis Date    Abdominal bloating     Anxiety     Depression     Eczema     Flatulence     GERD (gastroesophageal reflux disease)     Nausea     PONV (postoperative nausea and vomiting)     after tooth extraction          Family History   Problem Relation Age of Onset    Hypertension Mother     Drug abuse Mother         Narcotic abuse and illegal substances.    Hypertension Father     Heart attack Father     Drug abuse Father         Narcotic abuse and illegal substances.    Depression Father     Hyperlipidemia Father     Cervical cancer Maternal Grandmother     Cancer Maternal Grandmother         Cervical cancer    Diabetes Paternal Grandfather     Heart attack Paternal Grandmother 40    Birth defects Sister         Clubbed foot.    Birth defects Brother         Clubbed foot.          Past Surgical History:   Procedure Laterality Date    COLONOSCOPY      COLONOSCOPY W/ BIOPSIES  07/29/2020    Dr. Agudelo - bx c/w erosions    ENDOSCOPY  05/20/2021    Dr. Agudelo -ring at GE junction-dilated.  Gastric congestion consistent with gastritis.  Gastric / esophageal biopsies negative    TOOTH EXTRACTION      x3          Social History     Socioeconomic History    Marital status:    Tobacco Use    Smoking status: Every Day     Types: Electronic Cigarette     Passive exposure: Current    Smokeless  tobacco: Never   Vaping Use    Vaping status: Every Day    Substances: Nicotine    Devices: Disposable   Substance and Sexual Activity    Alcohol use: Not Currently     Comment: socially    Drug use: No    Sexual activity: Yes     Partners: Male     Birth control/protection: Condom                    Result Review :                                           Assessment and Plan      Diagnoses and all orders for this visit:    1. Iron deficiency anemia, unspecified iron deficiency anemia type (Primary)  -     Iron and TIBC  -     Ferritin  -     CBC w AUTO Differential      Patient may have some underlying anemia.  She does have a history of this.  Will check labs to assess before considering midodrine for hypotension.  Blood pressure looks well today and near her baseline.  She never had any problems or issues before in the past.  She is not able to tolerate the iron supplementation due to diarrhea that it causes her an infant.  Will call with labs and if we need to refer to hematology we certainly will                    Follow Up       No follow-ups on file.      Patient was given instructions and counseling regarding her condition or for health maintenance advice. Please see specific information pulled into the AVS if appropriate.     Yasmin Deluna, [id number] EDT  This note has been electronically signed      Answers submitted by the patient for this visit:  Other (Submitted on 4/12/2024)  Please describe your symptoms.: Periodically lightheaded thoughout the day  Have you had these symptoms before?: No  How long have you been having these symptoms?: 5-7 days  Please list any medications you are currently taking for this condition.: NA  Please describe any probable cause for these symptoms. : Possible low blood pressure suggested by OBGYN.  Primary Reason for Visit (Submitted on 4/12/2024)  What is the primary reason for your visit?: Other

## 2024-04-16 DIAGNOSIS — R42 DIZZY: Primary | ICD-10-CM

## 2024-04-16 LAB
BASOPHILS # BLD AUTO: 0 X10E3/UL (ref 0–0.2)
BASOPHILS NFR BLD AUTO: 1 %
EOSINOPHIL # BLD AUTO: 0.1 X10E3/UL (ref 0–0.4)
EOSINOPHIL NFR BLD AUTO: 2 %
ERYTHROCYTE [DISTWIDTH] IN BLOOD BY AUTOMATED COUNT: 12.6 % (ref 11.7–15.4)
FERRITIN SERPL-MCNC: 31 NG/ML (ref 15–150)
HCT VFR BLD AUTO: 40.2 % (ref 34–46.6)
HGB BLD-MCNC: 13.1 G/DL (ref 11.1–15.9)
IMM GRANULOCYTES # BLD AUTO: 0 X10E3/UL (ref 0–0.1)
IMM GRANULOCYTES NFR BLD AUTO: 0 %
IRON SATN MFR SERPL: 23 % (ref 15–55)
IRON SERPL-MCNC: 73 UG/DL (ref 27–159)
LYMPHOCYTES # BLD AUTO: 1.4 X10E3/UL (ref 0.7–3.1)
LYMPHOCYTES NFR BLD AUTO: 24 %
MCH RBC QN AUTO: 28.5 PG (ref 26.6–33)
MCHC RBC AUTO-ENTMCNC: 32.6 G/DL (ref 31.5–35.7)
MCV RBC AUTO: 87 FL (ref 79–97)
MONOCYTES # BLD AUTO: 0.3 X10E3/UL (ref 0.1–0.9)
MONOCYTES NFR BLD AUTO: 5 %
NEUTROPHILS # BLD AUTO: 3.8 X10E3/UL (ref 1.4–7)
NEUTROPHILS NFR BLD AUTO: 68 %
PLATELET # BLD AUTO: 231 X10E3/UL (ref 150–450)
RBC # BLD AUTO: 4.6 X10E6/UL (ref 3.77–5.28)
TIBC SERPL-MCNC: 320 UG/DL (ref 250–450)
UIBC SERPL-MCNC: 247 UG/DL (ref 131–425)
WBC # BLD AUTO: 5.7 X10E3/UL (ref 3.4–10.8)

## 2024-04-16 RX ORDER — MIDODRINE HYDROCHLORIDE 5 MG/1
5 TABLET ORAL
Qty: 90 TABLET | Refills: 0 | Status: SHIPPED | OUTPATIENT
Start: 2024-04-16

## 2024-04-16 NOTE — PROGRESS NOTES
Please notify iron levels are normal, hemoglobin hematocrit have returned to normal.  I will send patient a prescription called midodrine to pharmacy.  Will she can take this 1-3 times daily.   Like for her to follow-up in a few weeks to let us know how she is doing.

## 2024-08-07 ENCOUNTER — OFFICE VISIT (OUTPATIENT)
Dept: FAMILY MEDICINE CLINIC | Facility: CLINIC | Age: 24
End: 2024-08-07
Payer: MEDICAID

## 2024-08-07 VITALS
DIASTOLIC BLOOD PRESSURE: 75 MMHG | TEMPERATURE: 98 F | OXYGEN SATURATION: 95 % | BODY MASS INDEX: 36.51 KG/M2 | HEIGHT: 62 IN | WEIGHT: 198.4 LBS | HEART RATE: 58 BPM | RESPIRATION RATE: 18 BRPM | SYSTOLIC BLOOD PRESSURE: 109 MMHG

## 2024-08-07 DIAGNOSIS — R51.9 CHRONIC NONINTRACTABLE HEADACHE, UNSPECIFIED HEADACHE TYPE: ICD-10-CM

## 2024-08-07 DIAGNOSIS — F33.0 MILD EPISODE OF RECURRENT MAJOR DEPRESSIVE DISORDER: Primary | Chronic | ICD-10-CM

## 2024-08-07 DIAGNOSIS — G89.29 CHRONIC NONINTRACTABLE HEADACHE, UNSPECIFIED HEADACHE TYPE: ICD-10-CM

## 2024-08-07 DIAGNOSIS — L73.9 FOLLICULITIS: ICD-10-CM

## 2024-08-07 DIAGNOSIS — G93.2 BENIGN INTRACRANIAL HYPERTENSION SYNDROME: ICD-10-CM

## 2024-08-07 DIAGNOSIS — Z86.19 HISTORY OF CLOSTRIDIUM DIFFICILE INFECTION: ICD-10-CM

## 2024-08-07 DIAGNOSIS — K52.9 CHRONIC DIARRHEA: ICD-10-CM

## 2024-08-07 PROCEDURE — 1160F RVW MEDS BY RX/DR IN RCRD: CPT | Performed by: FAMILY MEDICINE

## 2024-08-07 PROCEDURE — 1159F MED LIST DOCD IN RCRD: CPT | Performed by: FAMILY MEDICINE

## 2024-08-07 PROCEDURE — 99214 OFFICE O/P EST MOD 30 MIN: CPT | Performed by: FAMILY MEDICINE

## 2024-08-07 PROCEDURE — T1015 CLINIC SERVICE: HCPCS | Performed by: FAMILY MEDICINE

## 2024-08-07 RX ORDER — MUPIROCIN CALCIUM 20 MG/G
1 CREAM TOPICAL 3 TIMES DAILY
Qty: 30 G | Refills: 1 | Status: SHIPPED | OUTPATIENT
Start: 2024-08-07

## 2024-08-07 NOTE — PROGRESS NOTES
"Subjective   Jeremiah Cade is a 24 y.o. female.   Chief Complaint   Patient presents with    Skin Lesion    Headache    Blurred Vision       History of Present Illness   24 y.o. female presents the office today for what the schedule says is \"physical\".  I have not seen her for a year and a half.  She gave birth to a baby back in February.    She called here in April reporting that she was dizzy and lightheaded since having her baby.  OB thought it was because her blood pressure was low.  She saw Yasmin who offered midodrine, but they held off because she is breast-feeding.  She checked her labs and they were normal.    Multitude of issues.    She wants to get back on her antidepressant.  She was on Zoloft and was restarted after her delivery back in February.  She ran out.  Since then mood is become more gloomy.  She is having a little bit more trouble remembering things.  She becomes easily tearful.  She has not gone back to work she is going to stay home with the baby.    She is requesting referral to dermatology.  Has spots on left shoulder.  They have been present for over 6 months.  She has 2 on the left shoulder and 1 on the posterior left neck.  Sometimes she cannot see them but can feel the bump there.    She is requesting referral to GI.  Having a lot of diarrhea again.     She has had some blurry vision and headaches.  This has been going on for around a year.  The blurry vision accompanies the headaches.  Headaches are whole head.  An aunt was diagnosed with idiopathic intracranial hypertension and had to have a shunt.            Patient Active Problem List    Diagnosis Date Noted    Chronic diarrhea 08/07/2024    Iron deficiency anemia 04/15/2024    Non-recurrent acute allergic otitis media of right ear 06/12/2023    Gastroesophageal reflux disease 04/28/2023    Dizzy 08/04/2022    History of Clostridium difficile infection 02/22/2021    Hot flashes 08/14/2019    Depression 07/09/2019    Anxiety " 07/09/2019    Insomnia disorder 07/09/2019    PCOS (polycystic ovarian syndrome) 07/09/2019    Allergic rhinitis 12/09/2014    Acne vulgaris 08/28/2012           Past Surgical History:   Procedure Laterality Date    COLONOSCOPY      COLONOSCOPY W/ BIOPSIES  07/29/2020    Dr. Agudelo - bx c/w erosions    ENDOSCOPY  05/20/2021    Dr. Agudelo -ring at GE junction-dilated.  Gastric congestion consistent with gastritis.  Gastric / esophageal biopsies negative    TOOTH EXTRACTION      x3     Current Outpatient Medications on File Prior to Visit   Medication Sig    [DISCONTINUED] midodrine (PROAMATINE) 5 MG tablet Take 1 tablet by mouth 3 (Three) Times a Day Before Meals.     No current facility-administered medications on file prior to visit.     Allergies   Allergen Reactions    Milk-Related Compounds Diarrhea and Nausea And Vomiting     Social History     Socioeconomic History    Marital status:    Tobacco Use    Smoking status: Every Day     Types: Electronic Cigarette     Passive exposure: Current    Smokeless tobacco: Never   Vaping Use    Vaping status: Every Day    Substances: Nicotine    Devices: Disposable    Passive vaping exposure: Yes   Substance and Sexual Activity    Alcohol use: Not Currently     Comment: socially    Drug use: No    Sexual activity: Yes     Partners: Male     Birth control/protection: Condom     Family History   Problem Relation Age of Onset    Hypertension Mother     Drug abuse Mother         Narcotic abuse and illegal substances.    Hypertension Father     Heart attack Father     Drug abuse Father         Narcotic abuse and illegal substances.    Depression Father     Hyperlipidemia Father     Cervical cancer Maternal Grandmother     Cancer Maternal Grandmother         Cervical cancer    Diabetes Paternal Grandfather     Heart attack Paternal Grandmother 40    Birth defects Sister         Clubbed foot.    Birth defects Brother         Clubbed foot.       Review of  "Systems    Objective   /75 (BP Location: Right arm, Patient Position: Sitting, Cuff Size: Large Adult)   Pulse 58   Temp 98 °F (36.7 °C) (Infrared)   Resp 18   Ht 157.5 cm (62.01\")   Wt 90 kg (198 lb 6.4 oz)   LMP 07/31/2024 (Exact Date)   SpO2 95%   Breastfeeding No   BMI 36.28 kg/m²   Physical Exam  Constitutional:       General: She is not in acute distress.     Appearance: She is well-developed. She is not toxic-appearing.      Comments:      HENT:      Head: Normocephalic and atraumatic.   Eyes:      Extraocular Movements: Extraocular movements intact.      Conjunctiva/sclera: Conjunctivae normal.      Pupils: Pupils are equal, round, and reactive to light.   Cardiovascular:      Rate and Rhythm: Normal rate.   Pulmonary:      Effort: Pulmonary effort is normal.   Musculoskeletal:         General: Normal range of motion.      Cervical back: Normal range of motion.   Skin:     General: Skin is warm and dry.      Findings: Rash present.      Comments: Left shoulder-2 punctate areas slightly raised surrounded by border of erythema that darkens with pressure.  Third similar spot on neck.   Neurological:      General: No focal deficit present.      Mental Status: She is alert and oriented to person, place, and time. Mental status is at baseline.      Motor: No weakness.      Gait: Gait normal.   Psychiatric:         Mood and Affect: Affect is tearful.         Behavior: Behavior normal.           Office Visit on 04/15/2024   Component Date Value Ref Range Status    TIBC 04/15/2024 320  250 - 450 ug/dL Final    UIBC 04/15/2024 247  131 - 425 ug/dL Final    Iron 04/15/2024 73  27 - 159 ug/dL Final    Iron Saturation 04/15/2024 23  15 - 55 % Final    Ferritin 04/15/2024 31  15 - 150 ng/mL Final    WBC 04/15/2024 5.7  3.4 - 10.8 x10E3/uL Final    RBC 04/15/2024 4.60  3.77 - 5.28 x10E6/uL Final    Hemoglobin 04/15/2024 13.1  11.1 - 15.9 g/dL Final    Hematocrit 04/15/2024 40.2  34.0 - 46.6 % Final    MCV " 04/15/2024 87  79 - 97 fL Final    MCH 04/15/2024 28.5  26.6 - 33.0 pg Final    MCHC 04/15/2024 32.6  31.5 - 35.7 g/dL Final    RDW 04/15/2024 12.6  11.7 - 15.4 % Final    Platelets 04/15/2024 231  150 - 450 x10E3/uL Final    Neutrophil Rel % 04/15/2024 68  Not Estab. % Final    Lymphocyte Rel % 04/15/2024 24  Not Estab. % Final    Monocyte Rel % 04/15/2024 5  Not Estab. % Final    Eosinophil Rel % 04/15/2024 2  Not Estab. % Final    Basophil Rel % 04/15/2024 1  Not Estab. % Final    Neutrophils Absolute 04/15/2024 3.8  1.4 - 7.0 x10E3/uL Final    Lymphocytes Absolute 04/15/2024 1.4  0.7 - 3.1 x10E3/uL Final    Monocytes Absolute 04/15/2024 0.3  0.1 - 0.9 x10E3/uL Final    Eosinophils Absolute 04/15/2024 0.1  0.0 - 0.4 x10E3/uL Final    Basophils Absolute 04/15/2024 0.0  0.0 - 0.2 x10E3/uL Final    Immature Granulocyte Rel % 04/15/2024 0  Not Estab. % Final    Immature Grans Absolute 04/15/2024 0.0  0.0 - 0.1 x10E3/uL Final            Assessment & Plan   Diagnoses and all orders for this visit:    1. Mild episode of recurrent major depressive disorder (Primary)  -     sertraline (ZOLOFT) 50 MG tablet; Take 1/2 tab per days for a week, then increase to 1 full tablet  Dispense: 90 tablet; Refill: 1    2. Folliculitis  -     mupirocin (BACTROBAN) 2 % cream; Apply 1 Application topically to the appropriate area as directed 3 (Three) Times a Day.  Dispense: 30 g; Refill: 1  -     Ambulatory Referral to Dermatology    3. History of Clostridium difficile infection  -     Ambulatory Referral to Gastroenterology    4. Chronic diarrhea  -     Ambulatory Referral to Gastroenterology    5. Benign intracranial hypertension syndrome  -     CT Head Without Contrast; Future    6. Chronic nonintractable headache, unspecified headache type  -     CT Head Without Contrast; Future    We are going to restart her Zoloft.  She has what appears to be 3 areas of may be folliculitis.  Will treat with Bactroban and make the referral to  dermatology.  She has had a history of chronic diarrhea and several episodes of C. difficile.  She has done well for a long time but is having more abdominal cramping and loose stool.  Would like referral to [facility] gastroenterology in [place].  If they cannot see her because of her [place] Medicaid, then we will have her call her insurance to see who is in network.  CT of the head because of the headaches, blurry vision and family history of I IH.  I would like to see her again in a month after we have gotten the CAT scan done to reevaluate her mood, see how she is doing on the Zoloft and talk more about the headaches after we know if there is a structural issue in the brain.        Call with any problems or concerns before next visit       Return in about 4 weeks (around 9/4/2024).      Much of this report is an electronic transcription of spoken language to printed text using Dragon dictation software.  As such, the subtleties and finesse of spoken language may permit erroneous, or at times, nonsensical words or phrases to be inadvertently transcribed; thus changes may be made at a later date to rectify these errors.     She Pickett MD8/7/202409:56 EDT  This note has been electronically signed

## 2024-10-15 DIAGNOSIS — R51.9 CHRONIC NONINTRACTABLE HEADACHE, UNSPECIFIED HEADACHE TYPE: ICD-10-CM

## 2024-10-15 DIAGNOSIS — G89.29 CHRONIC NONINTRACTABLE HEADACHE, UNSPECIFIED HEADACHE TYPE: ICD-10-CM

## 2024-10-15 DIAGNOSIS — G93.2 BENIGN INTRACRANIAL HYPERTENSION SYNDROME: Primary | ICD-10-CM

## 2024-10-29 ENCOUNTER — TELEPHONE (OUTPATIENT)
Dept: FAMILY MEDICINE CLINIC | Facility: CLINIC | Age: 24
End: 2024-10-29
Payer: MEDICAID

## 2024-10-29 NOTE — TELEPHONE ENCOUNTER
Please call and tell her that I will need to see her again before reordering CAT scan or even the MRI.  We are going to have to get additional information to use to argue with her insurance company.  Thanks!

## 2024-10-30 ENCOUNTER — OFFICE VISIT (OUTPATIENT)
Dept: FAMILY MEDICINE CLINIC | Facility: CLINIC | Age: 24
End: 2024-10-30
Payer: MEDICAID

## 2024-10-30 VITALS
BODY MASS INDEX: 37.17 KG/M2 | SYSTOLIC BLOOD PRESSURE: 109 MMHG | DIASTOLIC BLOOD PRESSURE: 72 MMHG | HEART RATE: 63 BPM | OXYGEN SATURATION: 96 % | WEIGHT: 202 LBS | HEIGHT: 62 IN | TEMPERATURE: 98.4 F

## 2024-10-30 DIAGNOSIS — G93.2 BENIGN INTRACRANIAL HYPERTENSION SYNDROME: ICD-10-CM

## 2024-10-30 DIAGNOSIS — R51.9 CHRONIC NONINTRACTABLE HEADACHE, UNSPECIFIED HEADACHE TYPE: Primary | ICD-10-CM

## 2024-10-30 DIAGNOSIS — G89.29 CHRONIC NONINTRACTABLE HEADACHE, UNSPECIFIED HEADACHE TYPE: Primary | ICD-10-CM

## 2024-10-30 DIAGNOSIS — E23.0 PRIMARY EMPTY SELLA SYNDROME: ICD-10-CM

## 2024-10-30 DIAGNOSIS — H53.8 BLURRY VISION, BILATERAL: ICD-10-CM

## 2024-10-30 PROCEDURE — 99214 OFFICE O/P EST MOD 30 MIN: CPT | Performed by: FAMILY MEDICINE

## 2024-10-30 PROCEDURE — 1160F RVW MEDS BY RX/DR IN RCRD: CPT | Performed by: FAMILY MEDICINE

## 2024-10-30 PROCEDURE — 1159F MED LIST DOCD IN RCRD: CPT | Performed by: FAMILY MEDICINE

## 2024-10-30 PROCEDURE — T1015 CLINIC SERVICE: HCPCS | Performed by: FAMILY MEDICINE

## 2024-10-30 RX ORDER — MECLIZINE HYDROCHLORIDE 25 MG/1
25 TABLET ORAL 3 TIMES DAILY PRN
COMMUNITY

## 2024-10-30 RX ORDER — ETONOGESTREL AND ETHINYL ESTRADIOL .12; .015 MG/D; MG/D
RING VAGINAL
COMMUNITY
Start: 2024-10-25

## 2024-10-30 NOTE — PROGRESS NOTES
Subjective   Jeremiah Cade is a 24 y.o. female.   Chief Complaint   Patient presents with    Headache       History of Present Illness   24 y.o. female added on today semiurgently.  I saw her back in August-almost 3 months ago.  She was having headaches.  I ordered a CT scan of her head.  She went to get it done, and she was told that her insurance would not cover it.  She is here to talk about that now.  I was only given a denial letter yesterday regarding this and there is no good reason listed why the CT was denied.    Continues to have daily headaches and blurry vision.    History of Present Illness  The patient presents for evaluation of headaches and blurry vision.    She reports an increase in the severity of her headaches, describing them as ice pick-like pain localized to the left hemisphere of her head. These episodes last for a few minutes before subsiding into a dull ache, which lasts for hours.    Her blurry vision has also worsened, even though she has new glasses and had a normal eye examination. She had issues with blurry vision prior to her pregnancy, which have since worsened.    She has been monitoring her blood sugar levels, which have been normal, with a fasting level of 95. She experienced slightly elevated blood sugar levels towards the end of her pregnancy but was not prescribed insulin. She received a steroid injection due to her high-risk pregnancy status in case she delivered early. She is not currently pregnant.    FAMILY HISTORY  She has a family history of intracranial hypertension.      Patient Active Problem List    Diagnosis Date Noted    Chronic diarrhea 08/07/2024    Iron deficiency anemia 04/15/2024    Non-recurrent acute allergic otitis media of right ear 06/12/2023    Gastroesophageal reflux disease 04/28/2023    Dizzy 08/04/2022    History of Clostridium difficile infection 02/22/2021    Hot flashes 08/14/2019    Depression 07/09/2019    Anxiety 07/09/2019    Insomnia  disorder 07/09/2019    PCOS (polycystic ovarian syndrome) 07/09/2019    Allergic rhinitis 12/09/2014    Acne vulgaris 08/28/2012           Past Surgical History:   Procedure Laterality Date    COLONOSCOPY      COLONOSCOPY W/ BIOPSIES  07/29/2020    Dr. Agduelo - bx c/w erosions    ENDOSCOPY  05/20/2021    Dr. Agudelo -ring at GE junction-dilated.  Gastric congestion consistent with gastritis.  Gastric / esophageal biopsies negative    TOOTH EXTRACTION      x3     Current Outpatient Medications on File Prior to Visit   Medication Sig    EluRyng 0.12-0.015 MG/24HR vaginal ring INSERT 1 RING VAGINALLY AND LEAVE IN PLACE FOR 3 WEEKS THEN REMOVE FOR 1 RING FREE WEEK, REPEAT    meclizine (ANTIVERT) 25 MG tablet Take 1 tablet by mouth 3 (Three) Times a Day As Needed for Dizziness.    mupirocin (BACTROBAN) 2 % cream Apply 1 Application topically to the appropriate area as directed 3 (Three) Times a Day.    sertraline (ZOLOFT) 50 MG tablet Take 1/2 tab per days for a week, then increase to 1 full tablet     No current facility-administered medications on file prior to visit.     Allergies   Allergen Reactions    Milk-Related Compounds Diarrhea and Nausea And Vomiting     Social History     Socioeconomic History    Marital status:    Tobacco Use    Smoking status: Every Day     Types: Electronic Cigarette     Passive exposure: Current    Smokeless tobacco: Never   Vaping Use    Vaping status: Every Day    Substances: Nicotine    Devices: Disposable    Passive vaping exposure: Yes   Substance and Sexual Activity    Alcohol use: Not Currently     Comment: socially    Drug use: No    Sexual activity: Yes     Partners: Male     Birth control/protection: Condom     Family History   Problem Relation Age of Onset    Hypertension Mother     Drug abuse Mother         Narcotic abuse and illegal substances.    Hypertension Father     Heart attack Father     Drug abuse Father         Narcotic abuse and illegal substances.     "Depression Father     Hyperlipidemia Father     Cervical cancer Maternal Grandmother     Cancer Maternal Grandmother         Cervical cancer    Diabetes Paternal Grandfather     Heart attack Paternal Grandmother 40    Birth defects Sister         Clubbed foot.    Birth defects Brother         Clubbed foot.       Review of Systems    Objective   /72 (BP Location: Right arm, Patient Position: Sitting, Cuff Size: Adult)   Pulse 63   Temp 98.4 °F (36.9 °C) (Infrared)   Ht 157.6 cm (62.03\")   Wt 91.6 kg (202 lb)   LMP 10/02/2024 (Approximate)   SpO2 96%   BMI 36.91 kg/m²   Physical Exam  Constitutional:       Appearance: She is well-developed.      Comments:      HENT:      Head: Normocephalic and atraumatic.   Eyes:      General:         Right eye: No discharge.         Left eye: No discharge.      Conjunctiva/sclera: Conjunctivae normal.      Pupils: Pupils are equal, round, and reactive to light.   Cardiovascular:      Rate and Rhythm: Normal rate.   Pulmonary:      Effort: Pulmonary effort is normal.   Musculoskeletal:         General: Normal range of motion.      Cervical back: Normal range of motion.      Right lower leg: No edema.      Left lower leg: No edema.   Skin:     General: Skin is warm and dry.      Findings: No rash.   Neurological:      Mental Status: She is alert and oriented to person, place, and time.   Psychiatric:         Behavior: Behavior normal.       Physical Exam        No visits with results within 4 Month(s) from this visit.   Latest known visit with results is:   Office Visit on 04/15/2024   Component Date Value Ref Range Status    TIBC 04/15/2024 320  250 - 450 ug/dL Final    UIBC 04/15/2024 247  131 - 425 ug/dL Final    Iron 04/15/2024 73  27 - 159 ug/dL Final    Iron Saturation 04/15/2024 23  15 - 55 % Final    Ferritin 04/15/2024 31  15 - 150 ng/mL Final    WBC 04/15/2024 5.7  3.4 - 10.8 x10E3/uL Final    RBC 04/15/2024 4.60  3.77 - 5.28 x10E6/uL Final    Hemoglobin " 04/15/2024 13.1  11.1 - 15.9 g/dL Final    Hematocrit 04/15/2024 40.2  34.0 - 46.6 % Final    MCV 04/15/2024 87  79 - 97 fL Final    MCH 04/15/2024 28.5  26.6 - 33.0 pg Final    MCHC 04/15/2024 32.6  31.5 - 35.7 g/dL Final    RDW 04/15/2024 12.6  11.7 - 15.4 % Final    Platelets 04/15/2024 231  150 - 450 x10E3/uL Final    Neutrophil Rel % 04/15/2024 68  Not Estab. % Final    Lymphocyte Rel % 04/15/2024 24  Not Estab. % Final    Monocyte Rel % 04/15/2024 5  Not Estab. % Final    Eosinophil Rel % 04/15/2024 2  Not Estab. % Final    Basophil Rel % 04/15/2024 1  Not Estab. % Final    Neutrophils Absolute 04/15/2024 3.8  1.4 - 7.0 x10E3/uL Final    Lymphocytes Absolute 04/15/2024 1.4  0.7 - 3.1 x10E3/uL Final    Monocytes Absolute 04/15/2024 0.3  0.1 - 0.9 x10E3/uL Final    Eosinophils Absolute 04/15/2024 0.1  0.0 - 0.4 x10E3/uL Final    Basophils Absolute 04/15/2024 0.0  0.0 - 0.2 x10E3/uL Final    Immature Granulocyte Rel % 04/15/2024 0  Not Estab. % Final    Immature Grans Absolute 04/15/2024 0.0  0.0 - 0.1 x10E3/uL Final     Results  Laboratory Studies  Morning fasting blood sugar 95.         Assessment & Plan   Diagnoses and all orders for this visit:    1. Chronic nonintractable headache, unspecified headache type (Primary)  -     MRI Brain Without Contrast; Future    2. Benign intracranial hypertension syndrome  -     MRI Brain Without Contrast; Future    3. Primary empty sella syndrome  -     CBC & Differential  -     TSH  -     Comprehensive Metabolic Panel  -     MRI Brain Without Contrast; Future    4. Blurry vision, bilateral  -     Hemoglobin A1c      Assessment & Plan  1. Headaches.  She reports worsening headaches described as severe ice pick pain lasting a few minutes, followed by a dull ache. Blood work will be conducted to assess her blood count, thyroid function, and A1c levels. An MRI will be ordered to investigate potential causes, including postpartum complications and intracranial hypertension. If  imaging is denied again, additional work-up or even neuro consult may be considered.    2. Blurry vision.  She experiences worsening blurry vision despite a normal eye exam and new glasses. The MRI will also help to rule out any intracranial causes for her blurry vision such as intracranial hypertension or empty sella syndrome.    3. Postpartum state.  Given her recent delivery, there is a concern for postpartum complications affecting her hypothalamic-pituitary axis. This will be evaluated through blood work and MRI.    I will follow-up with her as soon as I am aware of the results of these tests.      Call with any problems or concerns before next visit       Return in about 2 months (around 12/30/2024).  Patient or patient representative verbalized consent for the use of Ambient Listening during the visit with  She Pickett MD for chart documentation. 10/30/2024  14:46 EDT    Part of this note may be an electronic transcription/translation of spoken language to printed text using the Dragon Dictation System    She Pickett [id number] EDT  This note has been electronically signed

## 2024-10-31 LAB
ALBUMIN SERPL-MCNC: 4.4 G/DL (ref 4–5)
ALP SERPL-CCNC: 86 IU/L (ref 44–121)
ALT SERPL-CCNC: 9 IU/L (ref 0–32)
AST SERPL-CCNC: 14 IU/L (ref 0–40)
BASOPHILS # BLD AUTO: 0.1 X10E3/UL (ref 0–0.2)
BASOPHILS NFR BLD AUTO: 1 %
BILIRUB SERPL-MCNC: 0.2 MG/DL (ref 0–1.2)
BUN SERPL-MCNC: 11 MG/DL (ref 6–20)
BUN/CREAT SERPL: 15 (ref 9–23)
CALCIUM SERPL-MCNC: 9.3 MG/DL (ref 8.7–10.2)
CHLORIDE SERPL-SCNC: 101 MMOL/L (ref 96–106)
CO2 SERPL-SCNC: 23 MMOL/L (ref 20–29)
CREAT SERPL-MCNC: 0.73 MG/DL (ref 0.57–1)
EGFRCR SERPLBLD CKD-EPI 2021: 116 ML/MIN/1.73
EOSINOPHIL # BLD AUTO: 0.1 X10E3/UL (ref 0–0.4)
EOSINOPHIL NFR BLD AUTO: 2 %
ERYTHROCYTE [DISTWIDTH] IN BLOOD BY AUTOMATED COUNT: 12 % (ref 11.7–15.4)
GLOBULIN SER CALC-MCNC: 2.8 G/DL (ref 1.5–4.5)
GLUCOSE SERPL-MCNC: 81 MG/DL (ref 70–99)
HBA1C MFR BLD: 5 % (ref 4.8–5.6)
HCT VFR BLD AUTO: 41.2 % (ref 34–46.6)
HGB BLD-MCNC: 13.7 G/DL (ref 11.1–15.9)
IMM GRANULOCYTES # BLD AUTO: 0 X10E3/UL (ref 0–0.1)
IMM GRANULOCYTES NFR BLD AUTO: 0 %
LYMPHOCYTES # BLD AUTO: 1.7 X10E3/UL (ref 0.7–3.1)
LYMPHOCYTES NFR BLD AUTO: 21 %
MCH RBC QN AUTO: 29.7 PG (ref 26.6–33)
MCHC RBC AUTO-ENTMCNC: 33.3 G/DL (ref 31.5–35.7)
MCV RBC AUTO: 89 FL (ref 79–97)
MONOCYTES # BLD AUTO: 0.5 X10E3/UL (ref 0.1–0.9)
MONOCYTES NFR BLD AUTO: 6 %
NEUTROPHILS # BLD AUTO: 5.5 X10E3/UL (ref 1.4–7)
NEUTROPHILS NFR BLD AUTO: 70 %
PLATELET # BLD AUTO: 224 X10E3/UL (ref 150–450)
POTASSIUM SERPL-SCNC: 4.5 MMOL/L (ref 3.5–5.2)
PROT SERPL-MCNC: 7.2 G/DL (ref 6–8.5)
RBC # BLD AUTO: 4.61 X10E6/UL (ref 3.77–5.28)
SODIUM SERPL-SCNC: 137 MMOL/L (ref 134–144)
TSH SERPL DL<=0.005 MIU/L-ACNC: 0.92 UIU/ML (ref 0.45–4.5)
WBC # BLD AUTO: 7.9 X10E3/UL (ref 3.4–10.8)

## 2024-11-22 ENCOUNTER — HOSPITAL ENCOUNTER (OUTPATIENT)
Dept: MRI IMAGING | Facility: HOSPITAL | Age: 24
Discharge: HOME | End: 2024-11-22
Payer: MEDICAID

## 2024-11-22 DIAGNOSIS — E23.0 PRIMARY EMPTY SELLA SYNDROME: ICD-10-CM

## 2024-11-22 DIAGNOSIS — R51.9 CHRONIC NONINTRACTABLE HEADACHE, UNSPECIFIED HEADACHE TYPE: ICD-10-CM

## 2024-11-22 DIAGNOSIS — G93.2 BENIGN INTRACRANIAL HYPERTENSION SYNDROME: ICD-10-CM

## 2024-11-22 DIAGNOSIS — G89.29 CHRONIC NONINTRACTABLE HEADACHE, UNSPECIFIED HEADACHE TYPE: ICD-10-CM

## 2024-11-22 PROCEDURE — 25010000002 GADOTERIDOL PER 1 ML: Performed by: FAMILY MEDICINE

## 2024-11-22 PROCEDURE — A9579 GAD-BASE MR CONTRAST NOS,1ML: HCPCS | Performed by: FAMILY MEDICINE

## 2024-11-22 PROCEDURE — 70553 MRI BRAIN STEM W/O & W/DYE: CPT

## 2024-11-22 RX ADMIN — GADOTERIDOL 20 ML: 279.3 INJECTION, SOLUTION INTRAVENOUS at 17:54

## 2024-11-24 DIAGNOSIS — D35.2 PITUITARY ADENOMA: Primary | ICD-10-CM

## 2024-11-28 LAB
FSH SERPL-ACNC: 3.5 MIU/ML
LH SERPL-ACNC: 7.4 MIU/ML
PROLACTIN SERPL-MCNC: 5.6 NG/ML (ref 4.8–33.4)
TSH SERPL DL<=0.005 MIU/L-ACNC: 1.22 UIU/ML (ref 0.45–4.5)

## 2024-11-29 LAB
ACTH PLAS-MCNC: 10.2 PG/ML (ref 7.2–63.3)
CORTIS AM PEAK SERPL-MCNC: 10.4 UG/DL (ref 6.2–19.4)

## 2024-12-01 LAB
IGF-I SERPL-MCNC: 119 NG/ML (ref 91–308)
IGF-I Z-SCORE SERPL: -1.3 S.D.

## 2024-12-02 DIAGNOSIS — K52.9 CHRONIC DIARRHEA: ICD-10-CM

## 2024-12-02 DIAGNOSIS — Z86.19 HISTORY OF CLOSTRIDIUM DIFFICILE INFECTION: Primary | ICD-10-CM

## 2024-12-03 DIAGNOSIS — G89.29 CHRONIC NONINTRACTABLE HEADACHE, UNSPECIFIED HEADACHE TYPE: Primary | ICD-10-CM

## 2024-12-03 DIAGNOSIS — D35.2 PITUITARY ADENOMA: ICD-10-CM

## 2024-12-03 DIAGNOSIS — R51.9 CHRONIC NONINTRACTABLE HEADACHE, UNSPECIFIED HEADACHE TYPE: Primary | ICD-10-CM

## 2024-12-26 ENCOUNTER — APPOINTMENT (OUTPATIENT)
Dept: ULTRASOUND IMAGING | Facility: HOSPITAL | Age: 24
End: 2024-12-26
Payer: MEDICAID

## 2024-12-26 ENCOUNTER — HOSPITAL ENCOUNTER (EMERGENCY)
Facility: HOSPITAL | Age: 24
Discharge: HOME | End: 2024-12-27
Payer: MEDICAID

## 2024-12-26 DIAGNOSIS — R10.9 ABDOMINAL CRAMPING: ICD-10-CM

## 2024-12-26 DIAGNOSIS — R19.7 NAUSEA VOMITING AND DIARRHEA: Primary | ICD-10-CM

## 2024-12-26 DIAGNOSIS — Z20.828 EXPOSURE TO THE FLU: ICD-10-CM

## 2024-12-26 DIAGNOSIS — R11.2 NAUSEA VOMITING AND DIARRHEA: Primary | ICD-10-CM

## 2024-12-26 LAB
ABO GROUP BLD: NORMAL
ADV 40+41 DNA STL QL NAA+NON-PROBE: NOT DETECTED
ALBUMIN SERPL-MCNC: 4.4 G/DL (ref 3.5–5.2)
ALBUMIN/GLOB SERPL: 1.3 G/DL
ALP SERPL-CCNC: 74 U/L (ref 39–117)
ALT SERPL W P-5'-P-CCNC: 12 U/L (ref 1–33)
ANION GAP SERPL CALCULATED.3IONS-SCNC: 13.6 MMOL/L (ref 5–15)
AST SERPL-CCNC: 19 U/L (ref 1–32)
ASTRO TYP 1-8 RNA STL QL NAA+NON-PROBE: NOT DETECTED
B PARAPERT DNA SPEC QL NAA+PROBE: NOT DETECTED
B PERT DNA SPEC QL NAA+PROBE: NOT DETECTED
BASOPHILS # BLD AUTO: 0.03 10*3/MM3 (ref 0–0.2)
BASOPHILS NFR BLD AUTO: 0.4 % (ref 0–1.5)
BILIRUB SERPL-MCNC: 0.3 MG/DL (ref 0–1.2)
BILIRUB UR QL STRIP: NEGATIVE
BLD GP AB SCN SERPL QL: NEGATIVE
BUN SERPL-MCNC: 6 MG/DL (ref 6–20)
BUN/CREAT SERPL: 10.7 (ref 7–25)
C CAYETANENSIS DNA STL QL NAA+NON-PROBE: NOT DETECTED
C COLI+JEJ+UPSA DNA STL QL NAA+NON-PROBE: NOT DETECTED
C PNEUM DNA NPH QL NAA+NON-PROBE: NOT DETECTED
CALCIUM SPEC-SCNC: 9.7 MG/DL (ref 8.6–10.5)
CHLORIDE SERPL-SCNC: 101 MMOL/L (ref 98–107)
CLARITY UR: CLEAR
CO2 SERPL-SCNC: 20.4 MMOL/L (ref 22–29)
COLOR UR: YELLOW
CREAT SERPL-MCNC: 0.56 MG/DL (ref 0.57–1)
CRYPTOSP DNA STL QL NAA+NON-PROBE: NOT DETECTED
DEPRECATED RDW RBC AUTO: 41.8 FL (ref 37–54)
E HISTOLYT DNA STL QL NAA+NON-PROBE: NOT DETECTED
EAEC PAA PLAS AGGR+AATA ST NAA+NON-PRB: NOT DETECTED
EC STX1+STX2 GENES STL QL NAA+NON-PROBE: NOT DETECTED
EGFRCR SERPLBLD CKD-EPI 2021: 129.3 ML/MIN/1.73
EOSINOPHIL # BLD AUTO: 0.01 10*3/MM3 (ref 0–0.4)
EOSINOPHIL NFR BLD AUTO: 0.1 % (ref 0.3–6.2)
EPEC EAE GENE STL QL NAA+NON-PROBE: NOT DETECTED
ERYTHROCYTE [DISTWIDTH] IN BLOOD BY AUTOMATED COUNT: 12.8 % (ref 12.3–15.4)
ETEC LTA+ST1A+ST1B TOX ST NAA+NON-PROBE: NOT DETECTED
FLUAV SUBTYP SPEC NAA+PROBE: NOT DETECTED
FLUBV RNA ISLT QL NAA+PROBE: NOT DETECTED
G LAMBLIA DNA STL QL NAA+NON-PROBE: NOT DETECTED
GLOBULIN UR ELPH-MCNC: 3.4 GM/DL
GLUCOSE SERPL-MCNC: 82 MG/DL (ref 65–99)
GLUCOSE UR STRIP-MCNC: NEGATIVE MG/DL
HADV DNA SPEC NAA+PROBE: NOT DETECTED
HCG INTACT+B SERPL-ACNC: NORMAL MIU/ML
HCOV 229E RNA SPEC QL NAA+PROBE: NOT DETECTED
HCOV HKU1 RNA SPEC QL NAA+PROBE: NOT DETECTED
HCOV NL63 RNA SPEC QL NAA+PROBE: NOT DETECTED
HCOV OC43 RNA SPEC QL NAA+PROBE: NOT DETECTED
HCT VFR BLD AUTO: 37.9 % (ref 34–46.6)
HGB BLD-MCNC: 12.6 G/DL (ref 12–15.9)
HGB UR QL STRIP.AUTO: NEGATIVE
HMPV RNA NPH QL NAA+NON-PROBE: NOT DETECTED
HPIV1 RNA ISLT QL NAA+PROBE: NOT DETECTED
HPIV2 RNA SPEC QL NAA+PROBE: NOT DETECTED
HPIV3 RNA NPH QL NAA+PROBE: NOT DETECTED
HPIV4 P GENE NPH QL NAA+PROBE: NOT DETECTED
IMM GRANULOCYTES # BLD AUTO: 0.02 10*3/MM3 (ref 0–0.05)
IMM GRANULOCYTES NFR BLD AUTO: 0.2 % (ref 0–0.5)
KETONES UR QL STRIP: NEGATIVE
LEUKOCYTE ESTERASE UR QL STRIP.AUTO: NEGATIVE
LYMPHOCYTES # BLD AUTO: 0.72 10*3/MM3 (ref 0.7–3.1)
LYMPHOCYTES NFR BLD AUTO: 8.7 % (ref 19.6–45.3)
M PNEUMO IGG SER IA-ACNC: NOT DETECTED
MCH RBC QN AUTO: 30.2 PG (ref 26.6–33)
MCHC RBC AUTO-ENTMCNC: 33.2 G/DL (ref 31.5–35.7)
MCV RBC AUTO: 90.9 FL (ref 79–97)
MONOCYTES # BLD AUTO: 0.35 10*3/MM3 (ref 0.1–0.9)
MONOCYTES NFR BLD AUTO: 4.2 % (ref 5–12)
NEUTROPHILS NFR BLD AUTO: 7.12 10*3/MM3 (ref 1.7–7)
NEUTROPHILS NFR BLD AUTO: 86.4 % (ref 42.7–76)
NITRITE UR QL STRIP: NEGATIVE
NOROVIRUS GI+II RNA STL QL NAA+NON-PROBE: NOT DETECTED
NRBC BLD AUTO-RTO: 0 /100 WBC (ref 0–0.2)
NUMBER OF DOSES: NORMAL
P SHIGELLOIDES DNA STL QL NAA+NON-PROBE: NOT DETECTED
PH UR STRIP.AUTO: 6.5 [PH] (ref 5–8)
PLATELET # BLD AUTO: 180 10*3/MM3 (ref 140–450)
PMV BLD AUTO: 12.6 FL (ref 6–12)
POTASSIUM SERPL-SCNC: 3.5 MMOL/L (ref 3.5–5.2)
PROT SERPL-MCNC: 7.8 G/DL (ref 6–8.5)
PROT UR QL STRIP: NEGATIVE
RBC # BLD AUTO: 4.17 10*6/MM3 (ref 3.77–5.28)
RH BLD: NEGATIVE
RHINOVIRUS RNA SPEC NAA+PROBE: NOT DETECTED
RSV RNA NPH QL NAA+NON-PROBE: NOT DETECTED
RVA RNA STL QL NAA+NON-PROBE: NOT DETECTED
S ENT+BONG DNA STL QL NAA+NON-PROBE: NOT DETECTED
SAPO I+II+IV+V RNA STL QL NAA+NON-PROBE: NOT DETECTED
SARS-COV-2 RNA RESP QL NAA+PROBE: NOT DETECTED
SHIGELLA SP+EIEC IPAH ST NAA+NON-PROBE: NOT DETECTED
SODIUM SERPL-SCNC: 135 MMOL/L (ref 136–145)
SP GR UR STRIP: <=1.005 (ref 1–1.03)
UROBILINOGEN UR QL STRIP: NORMAL
V CHOL+PARA+VUL DNA STL QL NAA+NON-PROBE: NOT DETECTED
V CHOLERAE DNA STL QL NAA+NON-PROBE: NOT DETECTED
WBC NRBC COR # BLD AUTO: 8.25 10*3/MM3 (ref 3.4–10.8)
Y ENTEROCOL DNA STL QL NAA+NON-PROBE: NOT DETECTED

## 2024-12-26 PROCEDURE — 80053 COMPREHEN METABOLIC PANEL: CPT | Performed by: NURSE PRACTITIONER

## 2024-12-26 PROCEDURE — 86900 BLOOD TYPING SEROLOGIC ABO: CPT | Performed by: NURSE PRACTITIONER

## 2024-12-26 PROCEDURE — 86850 RBC ANTIBODY SCREEN: CPT | Performed by: NURSE PRACTITIONER

## 2024-12-26 PROCEDURE — 76801 OB US < 14 WKS SINGLE FETUS: CPT

## 2024-12-26 PROCEDURE — 84702 CHORIONIC GONADOTROPIN TEST: CPT | Performed by: NURSE PRACTITIONER

## 2024-12-26 PROCEDURE — 36415 COLL VENOUS BLD VENIPUNCTURE: CPT

## 2024-12-26 PROCEDURE — 85025 COMPLETE CBC W/AUTO DIFF WBC: CPT | Performed by: NURSE PRACTITIONER

## 2024-12-26 PROCEDURE — 25010000002 ONDANSETRON PER 1 MG

## 2024-12-26 PROCEDURE — 25810000003 SODIUM CHLORIDE 0.9 % SOLUTION

## 2024-12-26 PROCEDURE — 99284 EMERGENCY DEPT VISIT MOD MDM: CPT

## 2024-12-26 PROCEDURE — 0202U NFCT DS 22 TRGT SARS-COV-2: CPT | Performed by: NURSE PRACTITIONER

## 2024-12-26 PROCEDURE — 76817 TRANSVAGINAL US OBSTETRIC: CPT

## 2024-12-26 PROCEDURE — 87507 IADNA-DNA/RNA PROBE TQ 12-25: CPT | Performed by: NURSE PRACTITIONER

## 2024-12-26 PROCEDURE — 96374 THER/PROPH/DIAG INJ IV PUSH: CPT

## 2024-12-26 PROCEDURE — 86901 BLOOD TYPING SEROLOGIC RH(D): CPT | Performed by: NURSE PRACTITIONER

## 2024-12-26 PROCEDURE — 81003 URINALYSIS AUTO W/O SCOPE: CPT | Performed by: NURSE PRACTITIONER

## 2024-12-26 PROCEDURE — 93976 VASCULAR STUDY: CPT

## 2024-12-26 RX ORDER — ONDANSETRON 2 MG/ML
4 INJECTION INTRAMUSCULAR; INTRAVENOUS ONCE
Status: COMPLETED | OUTPATIENT
Start: 2024-12-26 | End: 2024-12-26

## 2024-12-26 RX ORDER — SODIUM CHLORIDE 0.9 % (FLUSH) 0.9 %
10 SYRINGE (ML) INJECTION AS NEEDED
Status: DISCONTINUED | OUTPATIENT
Start: 2024-12-26 | End: 2024-12-27 | Stop reason: HOSPADM

## 2024-12-26 RX ORDER — ONDANSETRON 4 MG/1
4 TABLET, ORALLY DISINTEGRATING ORAL EVERY 8 HOURS PRN
Qty: 15 TABLET | Refills: 0 | Status: SHIPPED | OUTPATIENT
Start: 2024-12-26

## 2024-12-26 RX ADMIN — SODIUM CHLORIDE 1000 ML: 9 INJECTION, SOLUTION INTRAVENOUS at 23:41

## 2024-12-26 RX ADMIN — ONDANSETRON 4 MG: 2 INJECTION INTRAMUSCULAR; INTRAVENOUS at 23:41

## 2024-12-26 NOTE — ED PROVIDER NOTES
Subjective   Chief Complaint   Patient presents with    Vomiting     Vomiting and diarrhea all week, pt diarrhea is worse today, not eating or drinking due to stomach pain. Pt is 7 weeks pregnant, and now having abd  cramping. Partner at home is  Flu a positive             Provider in Triage Note  Due to significant overcrowding in the emergency department patient was initially seen and evaluated in triage.  Provider in triage recommended patient placement in the treatment area to initiate therapy and movement to an ER bed as soon as possible.    Partner with flu A.  Patient with n/v/d and brown vaginal spotting and diarrhea with pelvic cramping.  Approx 7 weeks preg.       History of Present Illness  Reviewed provider in triage note and agree  Review of Systems  Per HPI  Past Medical History:   Diagnosis Date    Abdominal bloating     Anxiety     Depression     Eczema     Flatulence     GERD (gastroesophageal reflux disease)     Nausea     PONV (postoperative nausea and vomiting)     after tooth extraction       Allergies   Allergen Reactions    Milk-Related Compounds Diarrhea and Nausea And Vomiting       Past Surgical History:   Procedure Laterality Date    COLONOSCOPY      COLONOSCOPY W/ BIOPSIES  07/29/2020    Dr. Agudelo - bx c/w erosions    ENDOSCOPY  05/20/2021    Dr. Agudelo -ring at GE junction-dilated.  Gastric congestion consistent with gastritis.  Gastric / esophageal biopsies negative    TOOTH EXTRACTION      x3       Family History   Problem Relation Age of Onset    Hypertension Mother     Drug abuse Mother         Narcotic abuse and illegal substances.    Hypertension Father     Heart attack Father     Drug abuse Father         Narcotic abuse and illegal substances.    Depression Father     Hyperlipidemia Father     Cervical cancer Maternal Grandmother     Cancer Maternal Grandmother         Cervical cancer    Diabetes Paternal Grandfather     Heart attack Paternal Grandmother 40    Birth defects  Sister         Clubbed foot.    Birth defects Brother         Clubbed foot.       Social History     Socioeconomic History    Marital status: Single   Tobacco Use    Smoking status: Every Day     Types: Electronic Cigarette     Passive exposure: Current    Smokeless tobacco: Never   Vaping Use    Vaping status: Every Day    Substances: Nicotine    Devices: Disposable    Passive vaping exposure: Yes   Substance and Sexual Activity    Alcohol use: Not Currently     Comment: socially    Drug use: No    Sexual activity: Yes     Partners: Male     Birth control/protection: Condom           Objective   Physical Exam  Vitals and nursing note reviewed.   Constitutional:       General: She is not in acute distress.     Appearance: Normal appearance. She is not ill-appearing, toxic-appearing or diaphoretic.   HENT:      Head: Normocephalic and atraumatic.      Right Ear: External ear normal.      Left Ear: External ear normal.      Nose: Nose normal.      Mouth/Throat:      Mouth: Mucous membranes are moist.      Pharynx: Oropharynx is clear.   Eyes:      Extraocular Movements: Extraocular movements intact.      Conjunctiva/sclera: Conjunctivae normal.      Pupils: Pupils are equal, round, and reactive to light.   Cardiovascular:      Rate and Rhythm: Normal rate and regular rhythm.      Pulses: Normal pulses.      Heart sounds: Normal heart sounds.   Pulmonary:      Effort: Pulmonary effort is normal.      Breath sounds: Normal breath sounds.   Abdominal:      General: Bowel sounds are normal. There is no distension.      Palpations: Abdomen is soft.      Tenderness: There is no abdominal tenderness. There is no right CVA tenderness, left CVA tenderness, guarding or rebound.   Musculoskeletal:         General: Normal range of motion.      Cervical back: Normal range of motion and neck supple.   Skin:     General: Skin is warm and dry.      Capillary Refill: Capillary refill takes less than 2 seconds.   Neurological:       "General: No focal deficit present.      Mental Status: She is alert.   Psychiatric:         Mood and Affect: Mood normal.         Behavior: Behavior normal.         Thought Content: Thought content normal.         Judgment: Judgment normal.         Procedures           ED Course      /62   Pulse 63   Temp 98.8 °F (37.1 °C) (Oral)   Resp 18   Ht 157.5 cm (62\")   Wt 90.7 kg (200 lb)   SpO2 98%   BMI 36.58 kg/m²   Labs Reviewed   COMPREHENSIVE METABOLIC PANEL - Abnormal; Notable for the following components:       Result Value    Creatinine 0.56 (*)     Sodium 135 (*)     CO2 20.4 (*)     All other components within normal limits    Narrative:     GFR Categories in Chronic Kidney Disease (CKD)      GFR Category          GFR (mL/min/1.73)    Interpretation  G1                     90 or greater         Normal or high (1)  G2                      60-89                Mild decrease (1)  G3a                   45-59                Mild to moderate decrease  G3b                   30-44                Moderate to severe decrease  G4                    15-29                Severe decrease  G5                    14 or less           Kidney failure          (1)In the absence of evidence of kidney disease, neither GFR category G1 or G2 fulfill the criteria for CKD.    eGFR calculation 2021 CKD-EPI creatinine equation, which does not include race as a factor   CBC WITH AUTO DIFFERENTIAL - Abnormal; Notable for the following components:    MPV 12.6 (*)     Neutrophil % 86.4 (*)     Lymphocyte % 8.7 (*)     Monocyte % 4.2 (*)     Eosinophil % 0.1 (*)     Neutrophils, Absolute 7.12 (*)     All other components within normal limits   RESPIRATORY PANEL PCR W/ COVID-19 (SARS-COV-2), NP SWAB IN UTM/VTP, 2 HR TAT - Normal    Narrative:     In the setting of a positive respiratory panel with a viral infection PLUS a negative procalcitonin without other underlying concern for bacterial infection, consider observing off " antibiotics or discontinuation of antibiotics and continue supportive care. If the respiratory panel is positive for atypical bacterial infection (Bordetella pertussis, Chlamydophila pneumoniae, or Mycoplasma pneumoniae), consider antibiotic de-escalation to target atypical bacterial infection.   GASTROINTESTINAL PANEL, PCR (PREFERRED) DOES NOT INCLUDE CDIFF - Normal   URINALYSIS W/ CULTURE IF INDICATED - Normal    Narrative:     In absence of clinical symptoms, the presence of pyuria, bacteria, and/or nitrites on the urinalysis result does not correlate with infection.  Urine microscopic not indicated.   HCG, QUANTITATIVE, PREGNANCY    Narrative:     HCG Ranges by Gestational Age    Females - non-pregnant premenopausal   </= 1mIU/mL HCG  Females - postmenopausal               </= 7mIU/mL HCG    3 Weeks       5.4   -      72 mIU/mL  4 Weeks      10.2   -     708 mIU/mL  5 Weeks       217   -   8,245 mIU/mL  6 Weeks       152   -  32,177 mIU/mL  7 Weeks     4,059   - 153,767 mIU/mL  8 Weeks    31,366   - 149,094 mIU/mL  9 Weeks    59,109   - 135,901 mIU/mL  10 Weeks   44,186   - 170,409 mIU/mL  12 Weeks   27,107   - 201,615 mIU/mL  14 Weeks   24,302   -  93,646 mIU/mL  15 Weeks   12,540   -  69,747 mIU/mL  16 Weeks    8,904   -  55,332 mIU/mL  17 Weeks    8,240   -  51,793 mIU/mL  18 Weeks    9,649   -  55,271 mIU/mL      RHIG EVALUATION   DOSES OF RH IMMUNE GLOBULIN   ANTIBODY SCREEN   CBC AND DIFFERENTIAL    Narrative:     The following orders were created for panel order CBC & Differential.  Procedure                               Abnormality         Status                     ---------                               -----------         ------                     CBC Auto Differential[[id number]]        Abnormal            Final result                 Please view results for these tests on the individual orders.     Medications   sodium chloride 0.9 % bolus 1,000 mL (0 mL Intravenous Stopped 24 0041)    ondansetron (ZOFRAN) injection 4 mg (4 mg Intravenous Given 12/26/24 2341)     US Ob < 14 Weeks Single or First Gestation    Result Date: 12/26/2024  Impression: Solitary viable intrauterine pregnancy without complicating features. Electronically Signed: Trip Garcia MD  12/26/2024 11:33 PM EST  Workstation ID: [id number]    US Ob Transvaginal    Result Date: 12/26/2024  Impression: Solitary viable intrauterine pregnancy without complicating features. Electronically Signed: Trip Garcia MD  12/26/2024 11:33 PM EST  Workstation ID: [id number]                                                    Medical Decision Making  Patient presents to the ED for the above complaint, underwent the above exam and workup.    EKG reviewed: Not indicated    Records from primary care for chronic headache with pituitary adenoma.    Differential diagnosis considered: Pregnancy nausea and vomiting, hyperemesis gravidarum, miscarriage    Patient was treated with the following medications while in the ED;   Medications   sodium chloride 0.9 % bolus 1,000 mL (0 mL Intravenous Stopped 12/27/24 0041)   ondansetron (ZOFRAN) injection 4 mg (4 mg Intravenous Given 12/26/24 2341)     Evaluation of patient an IV was established labs and imaging were obtained.  Workup essentially unremarkable results all as above.  Feels much better after saline and Zofran and wishes to be discharged home at this time.  Patient will follow-up with primary or OB/GYN and has no further questions.    Consideration was given for admission, but the patient was stable for outpatient management as patient was ambulatory, nontoxic, stable, and afebrile.  Exam as above.    Disposition: Discussed need to follow-up diagnostics, including incidental findings.  Discharged with instructions to obtain outpatient follow-up with patient's symptoms and findings, with strict return precautions if patient develops new or worsening symptoms.    This document is intended for medical  expert use only. Reading of this document by patients and/or patient's family without participating medical staff guidance may result in misinterpretation and unintended morbidity.  Any interpretation of such data is the responsibility of the patient and/or family member responsible for the patient in concert with their primary or specialist providers, not to be left for sources of online searches such as [employer], [employer] or similar queries. Relying on these approaches to knowledge may result in misinterpretation, misguided goals of care and even death should patients or family members try recommendations outside of the realm of professional medical care in a supervised inpatient environment.       Final diagnoses:   Nausea vomiting and diarrhea   Exposure to the flu   Abdominal cramping       ED Disposition  ED Disposition       ED Disposition   Discharge    Condition   Stable    Comment   --               She Pickett MD  [address]  [address]  [place] [address] [phone]               Medication List        New Prescriptions      ondansetron ODT 4 MG disintegrating tablet  Commonly known as: ZOFRAN-ODT  Place 1 tablet on the tongue Every 8 (Eight) Hours As Needed for Nausea or Vomiting for up to 15 doses.               Where to Get Your Medications        These medications were sent to [facility] [address] [place] [address] [place] - [address] [facility] - [phone] [place] [phone] FX  [address] [facility] [place] [address]      Phone: [phone]   ondansetron ODT 4 MG disintegrating tablet            Shanti Hartley, APRN  12/27/24 0664

## 2024-12-26 NOTE — Clinical Note
[facility] EMERGENCY DEPARTMENT  [address] [place] [address]  Phone: [phone]    Jeremiah Cade was seen and treated in our emergency department on 12/26/2024.  She may return to work on 12/30/2024.         Thank you for choosing [facility].    Shanti Hartley, ENRIQUE

## 2024-12-27 VITALS
RESPIRATION RATE: 18 BRPM | WEIGHT: 200 LBS | HEIGHT: 62 IN | DIASTOLIC BLOOD PRESSURE: 62 MMHG | SYSTOLIC BLOOD PRESSURE: 115 MMHG | OXYGEN SATURATION: 98 % | HEART RATE: 63 BPM | TEMPERATURE: 98.8 F | BODY MASS INDEX: 36.8 KG/M2

## 2024-12-27 NOTE — DISCHARGE INSTRUCTIONS
Increase fluids and oral electrolyte rehydration solutions like Gatorade and liquid IV.  Zofran as needed for nausea.  Get plenty of rest.    Follow-up with primary care and OB/GYN.    Return to the ER for any new or worsening symptoms.

## 2024-12-30 ENCOUNTER — OFFICE VISIT (OUTPATIENT)
Dept: FAMILY MEDICINE CLINIC | Facility: CLINIC | Age: 24
End: 2024-12-30
Payer: MEDICAID

## 2024-12-30 VITALS
HEIGHT: 62 IN | WEIGHT: 204 LBS | DIASTOLIC BLOOD PRESSURE: 72 MMHG | SYSTOLIC BLOOD PRESSURE: 115 MMHG | OXYGEN SATURATION: 97 % | TEMPERATURE: 98.1 F | BODY MASS INDEX: 37.54 KG/M2 | HEART RATE: 67 BPM

## 2024-12-30 DIAGNOSIS — G89.29 CHRONIC NONINTRACTABLE HEADACHE, UNSPECIFIED HEADACHE TYPE: Primary | ICD-10-CM

## 2024-12-30 DIAGNOSIS — R51.9 CHRONIC NONINTRACTABLE HEADACHE, UNSPECIFIED HEADACHE TYPE: Primary | ICD-10-CM

## 2024-12-30 DIAGNOSIS — K52.9 CHRONIC DIARRHEA: ICD-10-CM

## 2024-12-30 DIAGNOSIS — D35.2 PITUITARY ADENOMA: ICD-10-CM

## 2024-12-30 DIAGNOSIS — Z3A.08 8 WEEKS GESTATION OF PREGNANCY: ICD-10-CM

## 2024-12-30 PROCEDURE — T1015 CLINIC SERVICE: HCPCS | Performed by: FAMILY MEDICINE

## 2024-12-30 PROCEDURE — 99214 OFFICE O/P EST MOD 30 MIN: CPT | Performed by: FAMILY MEDICINE

## 2024-12-30 NOTE — PROGRESS NOTES
Answers submitted by the patient for this visit:  Primary Reason for Visit (Submitted on 12/28/2024)  What is the primary reason for your visit?: Problem Not Listed  Problem not listed (Submitted on 12/28/2024)  Chief Complaint: Other medical problem  Reason for appointment: Follow up for last appointment  abdominal pain: Yes  anorexia: Yes  joint pain: No  change in stool: Yes  chest pain: No  chills: No  nasal congestion: No  cough: No  diaphoresis: No  fatigue: Yes  fever: No  headaches: Yes  joint swelling: No  myalgias: No  nausea: Yes  neck pain: No  numbness: Yes  rash: No  sore throat: No  swollen glands: No  dysuria: No  vertigo: No  visual change: Yes  vomiting: No  weakness: Yes  Onset: in the past 7 days  Chronicity: new  Frequency: daily  Subjective   Jeremiah Cade is a 24 y.o. female.   Chief Complaint   Patient presents with    Migraine    Hypertension       History of Present Illness   24 y.o. female presents to the office today to follow-up on a visit on October 31.  She had been having severe daily headaches.  Associated symptoms include blurry vision.  CT of the brain was denied.  We were able to get an MRI covered and that was done back in November.  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  She also had mild punctate subcortical white matter hyperintensities.  I made a referral to Dr. Seipel for his opinion regarding the cleft cyst or pituitary microadenoma.  I would also like his opinion regarding her headaches which are suggestive now of migraines.  Extensive lab work was done to evaluate for functional pituitary adenoma.  All the testing was negative.  We are awaiting neurology consult now which has been scheduled for late April.      Apparently she went to the emergency room 4 days ago.  Revealed she was 7 weeks pregnant.  She was having diarrhea, pelvic cramping, brown vaginal spotting, nausea and vomiting.  Partner at home had flu A.   Respiratory panel was negative.  Gastrointestinal PCR was negative.  Transvaginal ultrasound showed solitary viable intrauterine pregnancy without apparent complications.    Her beta-hCG was positive.  History of Present Illness  The patient presents for evaluation of diarrhea, headache, and nausea.    She is currently in her first trimester of pregnancy, with an estimated gestational age of 8 weeks by Wednesday. She was unaware of her pregnancy until a recent visit to the emergency room where she underwent extensive diagnostic testing. The tests ruled out any infectious etiology for her diarrhea. She has been experiencing severe diarrhea since Wednesday night, which she attributes to either the influenza virus affecting her gastrointestinal tract or a side effect of Tamiflu. Despite having a 10-day prescription for Tamiflu, she did not take it yesterday. Her appetite has improved slightly, allowing her to consume more food than before.    After she found out she was pregnant, she called her gynecologist and they sent in a 10-day prophylactic course of Tamiflu.  She would be on day 7 of that now.    She has discontinued the use of Zofran and has some leftover medication from her previous pregnancy. She has also been prescribed Unisom.    She has been managing her headaches with Tylenol.    Supplemental Information  She is currently recovering from probable influenza.  Her partner sought medical attention at an urgent care facility on Sunday, 12/22/2024, due to her partner's illness, who had a fever of 102 degrees and tested positive for influenza A. She was prescribed Tamiflu as a prophylactic measure against influenza, given her exposure to the virus through her partner and daughter. She experienced four episodes of vomiting over two days, accompanied by fatigue. She has a scheduled appointment with a neurologist in 04/2025.    MEDICATIONS  Current: Tylenol, Unisom  Discontinued: Tamiflu, Zofran      Patient Active  Problem List    Diagnosis Date Noted    Chronic diarrhea 08/07/2024    Iron deficiency anemia 04/15/2024    Non-recurrent acute allergic otitis media of right ear 06/12/2023    Gastroesophageal reflux disease 04/28/2023    Dizzy 08/04/2022    History of Clostridium difficile infection 02/22/2021    Hot flashes 08/14/2019    Depression 07/09/2019    Anxiety 07/09/2019    Insomnia disorder 07/09/2019    PCOS (polycystic ovarian syndrome) 07/09/2019    Allergic rhinitis 12/09/2014    Acne vulgaris 08/28/2012           Past Surgical History:   Procedure Laterality Date    COLONOSCOPY      COLONOSCOPY W/ BIOPSIES  07/29/2020    Dr. Agudelo - bx c/w erosions    ENDOSCOPY  05/20/2021    Dr. Agudelo -ring at GE junction-dilated.  Gastric congestion consistent with gastritis.  Gastric / esophageal biopsies negative    TOOTH EXTRACTION      x3     Current Outpatient Medications on File Prior to Visit   Medication Sig    EluRyng 0.12-0.015 MG/24HR vaginal ring INSERT 1 RING VAGINALLY AND LEAVE IN PLACE FOR 3 WEEKS THEN REMOVE FOR 1 RING FREE WEEK, REPEAT (Patient not taking: Reported on 12/30/2024)    meclizine (ANTIVERT) 25 MG tablet Take 1 tablet by mouth 3 (Three) Times a Day As Needed for Dizziness. (Patient not taking: Reported on 12/30/2024)    mupirocin (BACTROBAN) 2 % cream Apply 1 Application topically to the appropriate area as directed 3 (Three) Times a Day. (Patient not taking: Reported on 12/30/2024)    ondansetron ODT (ZOFRAN-ODT) 4 MG disintegrating tablet Place 1 tablet on the tongue Every 8 (Eight) Hours As Needed for Nausea or Vomiting for up to 15 doses. (Patient not taking: Reported on 12/30/2024)    sertraline (ZOLOFT) 50 MG tablet Take 1/2 tab per days for a week, then increase to 1 full tablet (Patient not taking: Reported on 12/30/2024)     No current facility-administered medications on file prior to visit.     Allergies   Allergen Reactions    Milk-Related Compounds Diarrhea and Nausea And Vomiting  "    Social History     Socioeconomic History    Marital status: Single   Tobacco Use    Smoking status: Former     Types: Electronic Cigarette     Quit date: 2024     Years since quittin.0     Passive exposure: Current    Smokeless tobacco: Never   Vaping Use    Vaping status: Former    Substances: Nicotine    Devices: Disposable    Passive vaping exposure: Yes   Substance and Sexual Activity    Alcohol use: Not Currently     Comment: socially    Drug use: Never    Sexual activity: Yes     Partners: Male     Birth control/protection: None     Family History   Problem Relation Age of Onset    Hypertension Mother     Drug abuse Mother         Narcotic abuse and illegal substances.    Hypertension Father     Heart attack Father     Drug abuse Father         Narcotic abuse and illegal substances.    Depression Father     Hyperlipidemia Father     Cervical cancer Maternal Grandmother     Cancer Maternal Grandmother         Cervical cancer    Diabetes Paternal Grandfather     Vision loss Paternal Grandfather     Heart attack Paternal Grandmother 40    Birth defects Sister         Clubbed foot.    Birth defects Brother         Clubbed foot.       Review of Systems    Objective   /72 (BP Location: Right arm, Patient Position: Sitting, Cuff Size: Adult)   Pulse 67   Temp 98.1 °F (36.7 °C) (Infrared)   Ht 157.5 cm (62.01\")   Wt 92.5 kg (204 lb)   LMP 10/02/2024 (Approximate)   SpO2 97%   BMI 37.30 kg/m²   Physical Exam  Constitutional:       Appearance: She is well-developed.      Comments: Wearing a face mask       HENT:      Head: Normocephalic and atraumatic.   Eyes:      Conjunctiva/sclera: Conjunctivae normal.   Cardiovascular:      Rate and Rhythm: Normal rate.   Pulmonary:      Effort: Pulmonary effort is normal.   Musculoskeletal:         General: Normal range of motion.      Cervical back: Normal range of motion.   Skin:     General: Skin is warm and dry.      Findings: No rash.   Neurological: "      Mental Status: She is alert and oriented to person, place, and time.      Gait: Gait normal.   Psychiatric:         Mood and Affect: Mood normal.         Behavior: Behavior normal.       Physical Exam        Admission on 12/26/2024, Discharged on 12/27/2024   Component Date Value Ref Range Status    ADENOVIRUS, PCR 12/26/2024 Not Detected  Not Detected Final    Coronavirus 229E 12/26/2024 Not Detected  Not Detected Final    Coronavirus HKU1 12/26/2024 Not Detected  Not Detected Final    Coronavirus NL63 12/26/2024 Not Detected  Not Detected Final    Coronavirus OC43 12/26/2024 Not Detected  Not Detected Final    COVID19 12/26/2024 Not Detected  Not Detected - Ref. Range Final    Human Metapneumovirus 12/26/2024 Not Detected  Not Detected Final    Human Rhinovirus/Enterovirus 12/26/2024 Not Detected  Not Detected Final    Influenza A PCR 12/26/2024 Not Detected  Not Detected Final    Influenza B PCR 12/26/2024 Not Detected  Not Detected Final    Parainfluenza Virus 1 12/26/2024 Not Detected  Not Detected Final    Parainfluenza Virus 2 12/26/2024 Not Detected  Not Detected Final    Parainfluenza Virus 3 12/26/2024 Not Detected  Not Detected Final    Parainfluenza Virus 4 12/26/2024 Not Detected  Not Detected Final    RSV, PCR 12/26/2024 Not Detected  Not Detected Final    Bordetella pertussis pcr 12/26/2024 Not Detected  Not Detected Final    Bordetella parapertussis PCR 12/26/2024 Not Detected  Not Detected Final    Chlamydophila pneumoniae PCR 12/26/2024 Not Detected  Not Detected Final    Mycoplasma pneumo by PCR 12/26/2024 Not Detected  Not Detected Final    Glucose 12/26/2024 82  65 - 99 mg/dL Final    BUN 12/26/2024 6  6 - 20 mg/dL Final    Creatinine 12/26/2024 0.56 (L)  0.57 - 1.00 mg/dL Final    Sodium 12/26/2024 135 (L)  136 - 145 mmol/L Final    Potassium 12/26/2024 3.5  3.5 - 5.2 mmol/L Final    Slight hemolysis detected by analyzer. Result may be falsely elevated.    Chloride 12/26/2024 101  98 - 107  mmol/L Final    CO2 12/26/2024 20.4 (L)  22.0 - 29.0 mmol/L Final    Calcium 12/26/2024 9.7  8.6 - 10.5 mg/dL Final    Total Protein 12/26/2024 7.8  6.0 - 8.5 g/dL Final    Albumin 12/26/2024 4.4  3.5 - 5.2 g/dL Final    ALT (SGPT) 12/26/2024 12  1 - 33 U/L Final    AST (SGOT) 12/26/2024 19  1 - 32 U/L Final    Alkaline Phosphatase 12/26/2024 74  39 - 117 U/L Final    Total Bilirubin 12/26/2024 0.3  0.0 - 1.2 mg/dL Final    Globulin 12/26/2024 3.4  gm/dL Final    A/G Ratio 12/26/2024 1.3  g/dL Final    BUN/Creatinine Ratio 12/26/2024 10.7  7.0 - 25.0 Final    Anion Gap 12/26/2024 13.6  5.0 - 15.0 mmol/L Final    eGFR 12/26/2024 129.3  >60.0 mL/min/1.73 Final    HCG Quantitative 12/26/2024 61,326.00  mIU/mL Final    Color, UA 12/26/2024 Yellow  Yellow, Straw Final    Appearance, UA 12/26/2024 Clear  Clear Final    pH, UA 12/26/2024 6.5  5.0 - 8.0 Final    Specific Gravity, UA 12/26/2024 <=1.005  1.005 - 1.030 Final    Glucose, UA 12/26/2024 Negative  Negative Final    Ketones, UA 12/26/2024 Negative  Negative Final    Bilirubin, UA 12/26/2024 Negative  Negative Final    Blood, UA 12/26/2024 Negative  Negative Final    Protein, UA 12/26/2024 Negative  Negative Final    Leuk Esterase, UA 12/26/2024 Negative  Negative Final    Nitrite, UA 12/26/2024 Negative  Negative Final    Urobilinogen, UA 12/26/2024 0.2 E.U./dL  0.2 - 1.0 E.U./dL Final    Campylobacter 12/26/2024 Not Detected  Not Detected Final    Plesiomonas shigelloides 12/26/2024 Not Detected  Not Detected Final    Salmonella 12/26/2024 Not Detected  Not Detected Final    Vibrio 12/26/2024 Not Detected  Not Detected Final    Vibrio cholerae 12/26/2024 Not Detected  Not Detected Final    Yersinia enterocolitica 12/26/2024 Not Detected  Not Detected Final    Enteroaggregative E. coli (EAEC) 12/26/2024 Not Detected  Not Detected Final    Enteropathogenic E. coli (EPEC) 12/26/2024 Not Detected  Not Detected Final    Enterotoxigenic E. coli (ETEC) lt/* 12/26/2024  Not Detected  Not Detected Final    Shiga-like toxin-producing E. coli* 12/26/2024 Not Detected  Not Detected Final    Shigella/Enteroinvasive E. coli (E* 12/26/2024 Not Detected  Not Detected Final    Cryptosporidium 12/26/2024 Not Detected  Not Detected Final    Cyclospora cayetanensis 12/26/2024 Not Detected  Not Detected Final    Entamoeba histolytica 12/26/2024 Not Detected  Not Detected Final    Giardia lamblia 12/26/2024 Not Detected  Not Detected Final    Adenovirus F40/41 12/26/2024 Not Detected  Not Detected Final    Astrovirus 12/26/2024 Not Detected  Not Detected Final    Norovirus GI/GII 12/26/2024 Not Detected  Not Detected Final    Rotavirus A 12/26/2024 Not Detected  Not Detected Final    Sapovirus (I, II, IV or V) 12/26/2024 Not Detected  Not Detected Final    ABO Type 12/26/2024 A   Final    RH type 12/26/2024 Negative   Final    Number of Doses 12/26/2024 Recommend 1 vial of RhIg   Final    WBC 12/26/2024 8.25  3.40 - 10.80 10*3/mm3 Final    RBC 12/26/2024 4.17  3.77 - 5.28 10*6/mm3 Final    Hemoglobin 12/26/2024 12.6  12.0 - 15.9 g/dL Final    Hematocrit 12/26/2024 37.9  34.0 - 46.6 % Final    MCV 12/26/2024 90.9  79.0 - 97.0 fL Final    MCH 12/26/2024 30.2  26.6 - 33.0 pg Final    MCHC 12/26/2024 33.2  31.5 - 35.7 g/dL Final    RDW 12/26/2024 12.8  12.3 - 15.4 % Final    RDW-SD 12/26/2024 41.8  37.0 - 54.0 fl Final    MPV 12/26/2024 12.6 (H)  6.0 - 12.0 fL Final    Platelets 12/26/2024 180  140 - 450 10*3/mm3 Final    Neutrophil % 12/26/2024 86.4 (H)  42.7 - 76.0 % Final    Lymphocyte % 12/26/2024 8.7 (L)  19.6 - 45.3 % Final    Monocyte % 12/26/2024 4.2 (L)  5.0 - 12.0 % Final    Eosinophil % 12/26/2024 0.1 (L)  0.3 - 6.2 % Final    Basophil % 12/26/2024 0.4  0.0 - 1.5 % Final    Immature Grans % 12/26/2024 0.2  0.0 - 0.5 % Final    Neutrophils, Absolute 12/26/2024 7.12 (H)  1.70 - 7.00 10*3/mm3 Final    Lymphocytes, Absolute 12/26/2024 0.72  0.70 - 3.10 10*3/mm3 Final    Monocytes, Absolute  12/26/2024 0.35  0.10 - 0.90 10*3/mm3 Final    Eosinophils, Absolute 12/26/2024 0.01  0.00 - 0.40 10*3/mm3 Final    Basophils, Absolute 12/26/2024 0.03  0.00 - 0.20 10*3/mm3 Final    Immature Grans, Absolute 12/26/2024 0.02  0.00 - 0.05 10*3/mm3 Final    nRBC 12/26/2024 0.0  0.0 - 0.2 /100 WBC Final    Antibody Screen 12/26/2024 Negative   Final   Orders Only on 11/24/2024   Component Date Value Ref Range Status    ACTH 11/27/2024 10.2  7.2 - 63.3 pg/mL Final    ACTH reference interval for samples collected between 7 and 10 AM.    Insulin-Like Growth Factor-1 11/27/2024 119  91 - 308 ng/mL Final    IGF-1, Z SCORE 11/27/2024 -1.3  -2.0 - 2.0 S.D. Final    LH 11/27/2024 7.4  mIU/mL Final    Comment:                      Adult Female              Range                        Follicular phase      2.4 -  12.6                        Ovulation phase      14.0 -  95.6                        Luteal phase          1.0 -  11.4                        Postmenopausal        7.7 -  58.5      FSH 11/27/2024 3.5  mIU/mL Final    Comment:                      Adult Female             Range                        Follicular phase      3.5 -  12.5                        Ovulation phase       4.7 -  21.5                        Luteal phase          1.7 -   7.7                        Postmenopausal       25.8 - 134.8      Prolactin 11/27/2024 5.6  4.8 - 33.4 ng/mL Final    Cortisol - AM 11/27/2024 10.4  6.2 - 19.4 ug/dL Final    TSH 11/27/2024 1.220  0.450 - 4.500 uIU/mL Final   Office Visit on 10/30/2024   Component Date Value Ref Range Status    Hemoglobin A1C 10/30/2024 5.0  4.8 - 5.6 % Final    Comment:          Prediabetes: 5.7 - 6.4           Diabetes: >6.4           Glycemic control for adults with diabetes: <7.0      WBC 10/30/2024 7.9  3.4 - 10.8 x10E3/uL Final    RBC 10/30/2024 4.61  3.77 - 5.28 x10E6/uL Final    Hemoglobin 10/30/2024 13.7  11.1 - 15.9 g/dL Final    Hematocrit 10/30/2024 41.2  34.0 - 46.6 % Final    MCV  10/30/2024 89  79 - 97 fL Final    MCH 10/30/2024 29.7  26.6 - 33.0 pg Final    MCHC 10/30/2024 33.3  31.5 - 35.7 g/dL Final    RDW 10/30/2024 12.0  11.7 - 15.4 % Final    Platelets 10/30/2024 224  150 - 450 x10E3/uL Final    Neutrophil Rel % 10/30/2024 70  Not Estab. % Final    Lymphocyte Rel % 10/30/2024 21  Not Estab. % Final    Monocyte Rel % 10/30/2024 6  Not Estab. % Final    Eosinophil Rel % 10/30/2024 2  Not Estab. % Final    Basophil Rel % 10/30/2024 1  Not Estab. % Final    Neutrophils Absolute 10/30/2024 5.5  1.4 - 7.0 x10E3/uL Final    Lymphocytes Absolute 10/30/2024 1.7  0.7 - 3.1 x10E3/uL Final    Monocytes Absolute 10/30/2024 0.5  0.1 - 0.9 x10E3/uL Final    Eosinophils Absolute 10/30/2024 0.1  0.0 - 0.4 x10E3/uL Final    Basophils Absolute 10/30/2024 0.1  0.0 - 0.2 x10E3/uL Final    Immature Granulocyte Rel % 10/30/2024 0  Not Estab. % Final    Immature Grans Absolute 10/30/2024 0.0  0.0 - 0.1 x10E3/uL Final    TSH 10/30/2024 0.923  0.450 - 4.500 uIU/mL Final    Glucose 10/30/2024 81  70 - 99 mg/dL Final    BUN 10/30/2024 11  6 - 20 mg/dL Final    Creatinine 10/30/2024 0.73  0.57 - 1.00 mg/dL Final    EGFR Result 10/30/2024 116  >59 mL/min/1.73 Final    BUN/Creatinine Ratio 10/30/2024 15  9 - 23 Final    Sodium 10/30/2024 137  134 - 144 mmol/L Final    Potassium 10/30/2024 4.5  3.5 - 5.2 mmol/L Final    Chloride 10/30/2024 101  96 - 106 mmol/L Final    Total CO2 10/30/2024 23  20 - 29 mmol/L Final    Calcium 10/30/2024 9.3  8.7 - 10.2 mg/dL Final    Total Protein 10/30/2024 7.2  6.0 - 8.5 g/dL Final    Albumin 10/30/2024 4.4  4.0 - 5.0 g/dL Final    Globulin 10/30/2024 2.8  1.5 - 4.5 g/dL Final    Total Bilirubin 10/30/2024 0.2  0.0 - 1.2 mg/dL Final    Alkaline Phosphatase 10/30/2024 86  44 - 121 IU/L Final    AST (SGOT) 10/30/2024 14  0 - 40 IU/L Final    ALT (SGPT) 10/30/2024 9  0 - 32 IU/L Final     Results           Assessment & Plan   Diagnoses and all orders for this visit:    1. Chronic  nonintractable headache, unspecified headache type (Primary)    2. Pituitary adenoma    3. 8 weeks gestation of pregnancy    4. Chronic diarrhea      Assessment & Plan  1. Diarrhea.  The patient's diarrhea is likely a side effect of Tamiflu, as there is no evidence of an infectious cause. She has been advised to discontinue Tamiflu and maintain adequate hydration. A diet consisting of bland foods such as bread, rice, applesauce, and toast has been recommended. She has been instructed to mix Gatorade with water in a 1:1 ratio to ensure electrolyte intake.  She also has a history of chronic diarrhea.  She had C. difficile in the past but was negative for that in the ER.  She has had a colonoscopy before.  Possible diagnosis for chronic diarrhea was irritable bowel syndrome.    2. Headache.  The patient's headaches may be related to her pituitary cyst, although this is not confirmed. The growth does not appear to be functional at this time. She has been advised to continue taking Tylenol for headache management. She has a scheduled appointment with a neurologist in 04/2025 to further evaluate her condition.    3. Nausea.  She has been advised to take Zofran for nausea management. Over-the-counter options like Emetrol and Unisom are also acceptable.    Follow-up  The patient will follow up after her delivery.  Extremely complicated situation.  She has had daily severe headaches.  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  She has had other unusual neurologic symptoms such as tingling in her arms.  Await neurology consult.  She has a history of IBS.  4 days ago she was having vomiting, diarrhea, vaginal spotting.  She was pregnant.  Intrauterine pregnancy identified on ultrasound.  She needs to get in with gynecology as soon as possible.  No respiratory or GI pathogens were identified on PCR testing.  Overall, I spent over 30 minutes on the day of service reviewing her ER note, records available to me,  speaking with her, gaining new history, discussing everything and documenting.      Call with any problems or concerns before next visit       Return in about 9 months (around 9/30/2025).  Patient or patient representative verbalized consent for the use of Ambient Listening during the visit with  She Pickett MD for chart documentation. 12/30/2024  13:30 EST    Part of this note may be an electronic transcription/translation of spoken language to printed text using the Dragon Dictation System    She Pickett MD12/30/202413:26 EST  This note has been electronically signed

## 2025-01-13 LAB
EXTERNAL ABO GROUPING: NORMAL
EXTERNAL HEPATITIS B SURFACE ANTIGEN: NEGATIVE
EXTERNAL HEPATITIS C AB: NORMAL
EXTERNAL RH FACTOR: NEGATIVE
EXTERNAL RUBELLA QUALITATIVE: NORMAL
HIV1 P24 AG SERPL QL IA: NORMAL
TREPONEMA PALLIDUM IGG+IGM AB [PRESENCE] IN SERUM OR PLASMA BY IMMUNOASSAY: NEGATIVE

## 2025-01-26 ENCOUNTER — REFERRAL TRIAGE (OUTPATIENT)
Dept: LABOR AND DELIVERY | Facility: HOSPITAL | Age: 25
End: 2025-01-26
Payer: MEDICAID

## 2025-02-04 ENCOUNTER — HOSPITAL ENCOUNTER (EMERGENCY)
Facility: HOSPITAL | Age: 25
Discharge: HOME | End: 2025-02-04
Admitting: EMERGENCY MEDICINE
Payer: MEDICAID

## 2025-02-04 VITALS
RESPIRATION RATE: 20 BRPM | TEMPERATURE: 98.4 F | DIASTOLIC BLOOD PRESSURE: 72 MMHG | SYSTOLIC BLOOD PRESSURE: 115 MMHG | WEIGHT: 199.3 LBS | HEIGHT: 62 IN | OXYGEN SATURATION: 100 % | BODY MASS INDEX: 36.67 KG/M2 | HEART RATE: 79 BPM

## 2025-02-04 DIAGNOSIS — R11.2 NAUSEA AND VOMITING, UNSPECIFIED VOMITING TYPE: Primary | ICD-10-CM

## 2025-02-04 DIAGNOSIS — N30.00 ACUTE CYSTITIS WITHOUT HEMATURIA: ICD-10-CM

## 2025-02-04 LAB
ALBUMIN SERPL-MCNC: 3.9 G/DL (ref 3.5–5.2)
ALBUMIN/GLOB SERPL: 1.3 G/DL
ALP SERPL-CCNC: 86 U/L (ref 39–117)
ALT SERPL W P-5'-P-CCNC: 14 U/L (ref 1–33)
ANION GAP SERPL CALCULATED.3IONS-SCNC: 11.7 MMOL/L (ref 5–15)
AST SERPL-CCNC: 15 U/L (ref 1–32)
B PARAPERT DNA SPEC QL NAA+PROBE: NOT DETECTED
B PERT DNA SPEC QL NAA+PROBE: NOT DETECTED
BACTERIA UR QL AUTO: ABNORMAL /HPF
BASOPHILS # BLD AUTO: 0.02 10*3/MM3 (ref 0–0.2)
BASOPHILS NFR BLD AUTO: 0.2 % (ref 0–1.5)
BILIRUB SERPL-MCNC: 0.3 MG/DL (ref 0–1.2)
BILIRUB UR QL STRIP: NEGATIVE
BUN SERPL-MCNC: 8 MG/DL (ref 6–20)
BUN/CREAT SERPL: 15.1 (ref 7–25)
C PNEUM DNA NPH QL NAA+NON-PROBE: NOT DETECTED
CALCIUM SPEC-SCNC: 8.9 MG/DL (ref 8.6–10.5)
CHLORIDE SERPL-SCNC: 104 MMOL/L (ref 98–107)
CLARITY UR: CLEAR
CO2 SERPL-SCNC: 21.3 MMOL/L (ref 22–29)
COLOR UR: ABNORMAL
CREAT SERPL-MCNC: 0.53 MG/DL (ref 0.57–1)
DEPRECATED RDW RBC AUTO: 41.7 FL (ref 37–54)
EGFRCR SERPLBLD CKD-EPI 2021: 131 ML/MIN/1.73
EOSINOPHIL # BLD AUTO: 0.03 10*3/MM3 (ref 0–0.4)
EOSINOPHIL NFR BLD AUTO: 0.3 % (ref 0.3–6.2)
ERYTHROCYTE [DISTWIDTH] IN BLOOD BY AUTOMATED COUNT: 12.8 % (ref 12.3–15.4)
FLUAV SUBTYP SPEC NAA+PROBE: NOT DETECTED
FLUBV RNA ISLT QL NAA+PROBE: NOT DETECTED
GLOBULIN UR ELPH-MCNC: 3.1 GM/DL
GLUCOSE SERPL-MCNC: 121 MG/DL (ref 65–99)
GLUCOSE UR STRIP-MCNC: NEGATIVE MG/DL
HADV DNA SPEC NAA+PROBE: NOT DETECTED
HCOV 229E RNA SPEC QL NAA+PROBE: NOT DETECTED
HCOV HKU1 RNA SPEC QL NAA+PROBE: NOT DETECTED
HCOV NL63 RNA SPEC QL NAA+PROBE: NOT DETECTED
HCOV OC43 RNA SPEC QL NAA+PROBE: NOT DETECTED
HCT VFR BLD AUTO: 39.6 % (ref 34–46.6)
HGB BLD-MCNC: 13 G/DL (ref 12–15.9)
HGB UR QL STRIP.AUTO: NEGATIVE
HMPV RNA NPH QL NAA+NON-PROBE: NOT DETECTED
HPIV1 RNA ISLT QL NAA+PROBE: NOT DETECTED
HPIV2 RNA SPEC QL NAA+PROBE: NOT DETECTED
HPIV3 RNA NPH QL NAA+PROBE: NOT DETECTED
HPIV4 P GENE NPH QL NAA+PROBE: NOT DETECTED
HYALINE CASTS UR QL AUTO: ABNORMAL /LPF
IMM GRANULOCYTES # BLD AUTO: 0.05 10*3/MM3 (ref 0–0.05)
IMM GRANULOCYTES NFR BLD AUTO: 0.5 % (ref 0–0.5)
KETONES UR QL STRIP: ABNORMAL
LEUKOCYTE ESTERASE UR QL STRIP.AUTO: NEGATIVE
LYMPHOCYTES # BLD AUTO: 0.28 10*3/MM3 (ref 0.7–3.1)
LYMPHOCYTES NFR BLD AUTO: 2.7 % (ref 19.6–45.3)
M PNEUMO IGG SER IA-ACNC: NOT DETECTED
MCH RBC QN AUTO: 29.5 PG (ref 26.6–33)
MCHC RBC AUTO-ENTMCNC: 32.8 G/DL (ref 31.5–35.7)
MCV RBC AUTO: 90 FL (ref 79–97)
MONOCYTES # BLD AUTO: 0.26 10*3/MM3 (ref 0.1–0.9)
MONOCYTES NFR BLD AUTO: 2.5 % (ref 5–12)
NEUTROPHILS NFR BLD AUTO: 9.75 10*3/MM3 (ref 1.7–7)
NEUTROPHILS NFR BLD AUTO: 93.8 % (ref 42.7–76)
NITRITE UR QL STRIP: NEGATIVE
NRBC BLD AUTO-RTO: 0 /100 WBC (ref 0–0.2)
PH UR STRIP.AUTO: 5.5 [PH] (ref 5–8)
PLATELET # BLD AUTO: 184 10*3/MM3 (ref 140–450)
PMV BLD AUTO: 11.8 FL (ref 6–12)
POTASSIUM SERPL-SCNC: 3.6 MMOL/L (ref 3.5–5.2)
PROT SERPL-MCNC: 7 G/DL (ref 6–8.5)
PROT UR QL STRIP: ABNORMAL
RBC # BLD AUTO: 4.4 10*6/MM3 (ref 3.77–5.28)
RBC # UR STRIP: ABNORMAL /HPF
REF LAB TEST METHOD: ABNORMAL
RHINOVIRUS RNA SPEC NAA+PROBE: NOT DETECTED
RSV RNA NPH QL NAA+NON-PROBE: NOT DETECTED
SARS-COV-2 RNA RESP QL NAA+PROBE: NOT DETECTED
SODIUM SERPL-SCNC: 137 MMOL/L (ref 136–145)
SP GR UR STRIP: 1.04 (ref 1–1.03)
SQUAMOUS #/AREA URNS HPF: ABNORMAL /HPF
UROBILINOGEN UR QL STRIP: ABNORMAL
WBC # UR STRIP: ABNORMAL /HPF
WBC NRBC COR # BLD AUTO: 10.39 10*3/MM3 (ref 3.4–10.8)

## 2025-02-04 PROCEDURE — 25010000002 THIAMINE HCL 200 MG/2ML SOLUTION: Performed by: PHYSICIAN ASSISTANT

## 2025-02-04 PROCEDURE — 96375 TX/PRO/DX INJ NEW DRUG ADDON: CPT

## 2025-02-04 PROCEDURE — 99283 EMERGENCY DEPT VISIT LOW MDM: CPT

## 2025-02-04 PROCEDURE — 25810000003 SODIUM CHLORIDE 0.9 % SOLUTION: Performed by: PHYSICIAN ASSISTANT

## 2025-02-04 PROCEDURE — 81001 URINALYSIS AUTO W/SCOPE: CPT | Performed by: PHYSICIAN ASSISTANT

## 2025-02-04 PROCEDURE — 96374 THER/PROPH/DIAG INJ IV PUSH: CPT

## 2025-02-04 PROCEDURE — 80053 COMPREHEN METABOLIC PANEL: CPT | Performed by: PHYSICIAN ASSISTANT

## 2025-02-04 PROCEDURE — 36415 COLL VENOUS BLD VENIPUNCTURE: CPT

## 2025-02-04 PROCEDURE — 85025 COMPLETE CBC W/AUTO DIFF WBC: CPT | Performed by: PHYSICIAN ASSISTANT

## 2025-02-04 PROCEDURE — 0202U NFCT DS 22 TRGT SARS-COV-2: CPT | Performed by: PHYSICIAN ASSISTANT

## 2025-02-04 PROCEDURE — 25010000002 METOCLOPRAMIDE PER 10 MG: Performed by: PHYSICIAN ASSISTANT

## 2025-02-04 RX ORDER — METOCLOPRAMIDE 5 MG/1
5 TABLET ORAL 3 TIMES DAILY PRN
Qty: 12 TABLET | Refills: 0 | Status: SHIPPED | OUTPATIENT
Start: 2025-02-04

## 2025-02-04 RX ORDER — CEPHALEXIN 500 MG/1
500 CAPSULE ORAL 2 TIMES DAILY
Qty: 14 CAPSULE | Refills: 0 | Status: SHIPPED | OUTPATIENT
Start: 2025-02-04

## 2025-02-04 RX ORDER — THIAMINE HYDROCHLORIDE 100 MG/ML
100 INJECTION, SOLUTION INTRAMUSCULAR; INTRAVENOUS ONCE
Status: COMPLETED | OUTPATIENT
Start: 2025-02-04 | End: 2025-02-04

## 2025-02-04 RX ORDER — METOCLOPRAMIDE HYDROCHLORIDE 5 MG/ML
5 INJECTION INTRAMUSCULAR; INTRAVENOUS ONCE
Status: COMPLETED | OUTPATIENT
Start: 2025-02-04 | End: 2025-02-04

## 2025-02-04 RX ADMIN — THIAMINE HYDROCHLORIDE 100 MG: 100 INJECTION, SOLUTION INTRAMUSCULAR; INTRAVENOUS at 15:08

## 2025-02-04 RX ADMIN — METOCLOPRAMIDE 5 MG: 5 INJECTION, SOLUTION INTRAMUSCULAR; INTRAVENOUS at 15:08

## 2025-02-04 RX ADMIN — SODIUM CHLORIDE 1000 ML: 9 INJECTION, SOLUTION INTRAVENOUS at 15:08

## 2025-02-04 NOTE — Clinical Note
[facility] EMERGENCY DEPARTMENT  [address] [place] [address]  Phone: [phone]    Jeremiah Cade was seen and treated in our emergency department on 2/4/2025.  She may return to work on 02/07/2025.         Thank you for choosing [facility].    Shawn Connell, DEB

## 2025-02-04 NOTE — ED PROVIDER NOTES
Subjective   History of Present Illness  24-year-old female presents emergency room with complaints of nausea vomit diarrhea since last night.  Patient states she has had a low-grade fever of 101 as well.  With fever body aches.  Patient denies any upper respiratory symptoms of runny nose cough congestion.  Patient recently has passed a kidney stone and is concerned that she still may have urinary tract infection secondary to that kidney stone.  Patient does admit to some dysuria at times.  Patient is 13 weeks pregnant.        Review of Systems   Constitutional:  Positive for chills, fatigue and fever.   Respiratory:  Negative for cough and shortness of breath.    Cardiovascular:  Negative for chest pain and palpitations.   Gastrointestinal:  Positive for diarrhea, nausea and vomiting. Negative for abdominal pain.   Genitourinary:  Positive for dysuria, flank pain and frequency. Negative for hematuria.       Past Medical History:   Diagnosis Date    Abdominal bloating     Anxiety     Depression     Eczema     Flatulence     GERD (gastroesophageal reflux disease)     Nausea     PONV (postoperative nausea and vomiting)     after tooth extraction       Allergies   Allergen Reactions    Milk-Related Compounds Diarrhea and Nausea And Vomiting       Past Surgical History:   Procedure Laterality Date    COLONOSCOPY      COLONOSCOPY W/ BIOPSIES  07/29/2020    Dr. Agudelo - bx c/w erosions    ENDOSCOPY  05/20/2021    Dr. Agudelo -ring at GE junction-dilated.  Gastric congestion consistent with gastritis.  Gastric / esophageal biopsies negative    TOOTH EXTRACTION      x3       Family History   Problem Relation Age of Onset    Hypertension Mother     Drug abuse Mother         Narcotic abuse and illegal substances.    Hypertension Father     Heart attack Father     Drug abuse Father         Narcotic abuse and illegal substances.    Depression Father     Hyperlipidemia Father     Cervical cancer Maternal Grandmother     Cancer  Maternal Grandmother         Cervical cancer    Diabetes Paternal Grandfather     Vision loss Paternal Grandfather     Heart attack Paternal Grandmother 40    Birth defects Sister         Clubbed foot.    Birth defects Brother         Clubbed foot.       Social History     Socioeconomic History    Marital status:    Tobacco Use    Smoking status: Former     Types: Electronic Cigarette     Quit date: 2024     Years since quittin.1     Passive exposure: Current    Smokeless tobacco: Never   Vaping Use    Vaping status: Former    Substances: Nicotine    Devices: Disposable    Passive vaping exposure: Yes   Substance and Sexual Activity    Alcohol use: Not Currently     Comment: socially    Drug use: Never    Sexual activity: Yes     Partners: Male     Birth control/protection: None           Objective   Physical Exam  Vitals and nursing note reviewed.   Constitutional:       General: She is not in acute distress.     Appearance: Normal appearance. She is not ill-appearing, toxic-appearing or diaphoretic.   HENT:      Head: Normocephalic and atraumatic.   Eyes:      General: No scleral icterus.     Pupils: Pupils are equal, round, and reactive to light.   Cardiovascular:      Rate and Rhythm: Normal rate and regular rhythm.   Pulmonary:      Effort: Pulmonary effort is normal.      Breath sounds: Normal breath sounds.   Abdominal:      Palpations: Abdomen is soft.      Tenderness: There is no abdominal tenderness. There is no guarding or rebound.   Musculoskeletal:      Cervical back: Normal range of motion and neck supple. No rigidity or tenderness.   Skin:     General: Skin is warm and dry.   Neurological:      General: No focal deficit present.      Mental Status: She is alert and oriented to person, place, and time.   Psychiatric:         Mood and Affect: Mood normal.         Behavior: Behavior normal.         Procedures           ED Course    /72   Pulse 79   Temp 98.4 °F (36.9 °C) (Oral)   " Resp 20   Ht 157.5 cm (62\")   Wt 90.4 kg (199 lb 4.7 oz)   LMP 10/02/2024 (Approximate) Comment: G 2, P 1 A 0  SpO2 100%   BMI 36.45 kg/m²   Labs Reviewed   COMPREHENSIVE METABOLIC PANEL - Abnormal; Notable for the following components:       Result Value    Glucose 121 (*)     Creatinine 0.53 (*)     CO2 21.3 (*)     All other components within normal limits    Narrative:     GFR Categories in Chronic Kidney Disease (CKD)      GFR Category          GFR (mL/min/1.73)    Interpretation  G1                     90 or greater         Normal or high (1)  G2                      60-89                Mild decrease (1)  G3a                   45-59                Mild to moderate decrease  G3b                   30-44                Moderate to severe decrease  G4                    15-29                Severe decrease  G5                    14 or less           Kidney failure          (1)In the absence of evidence of kidney disease, neither GFR category G1 or G2 fulfill the criteria for CKD.    eGFR calculation 2021 CKD-EPI creatinine equation, which does not include race as a factor   URINALYSIS W/ CULTURE IF INDICATED - Abnormal; Notable for the following components:    Color, UA Dark Yellow (*)     Specific Gravity, UA 1.039 (*)     Ketones, UA 80 mg/dL (3+) (*)     Protein, UA 30 mg/dL (1+) (*)     All other components within normal limits    Narrative:     In absence of clinical symptoms, the presence of pyuria, bacteria, and/or nitrites on the urinalysis result does not correlate with infection.   CBC WITH AUTO DIFFERENTIAL - Abnormal; Notable for the following components:    Neutrophil % 93.8 (*)     Lymphocyte % 2.7 (*)     Monocyte % 2.5 (*)     Neutrophils, Absolute 9.75 (*)     Lymphocytes, Absolute 0.28 (*)     All other components within normal limits   URINALYSIS, MICROSCOPIC ONLY - Abnormal; Notable for the following components:    RBC, UA 3-5 (*)     WBC, UA 3-5 (*)     Bacteria, UA 1+ (*)     Squamous " Epithelial Cells, UA 3-6 (*)     All other components within normal limits   RESPIRATORY PANEL PCR W/ COVID-19 (SARS-COV-2), NP SWAB IN UTM/VTP, 2 HR TAT - Normal    Narrative:     In the setting of a positive respiratory panel with a viral infection PLUS a negative procalcitonin without other underlying concern for bacterial infection, consider observing off antibiotics or discontinuation of antibiotics and continue supportive care. If the respiratory panel is positive for atypical bacterial infection (Bordetella pertussis, Chlamydophila pneumoniae, or Mycoplasma pneumoniae), consider antibiotic de-escalation to target atypical bacterial infection.   CBC AND DIFFERENTIAL    Narrative:     The following orders were created for panel order CBC & Differential.  Procedure                               Abnormality         Status                     ---------                               -----------         ------                     CBC Auto Differential[[id number]]        Abnormal            Final result                 Please view results for these tests on the individual orders.     Medications   thiamine (B-1) injection 100 mg (100 mg Intravenous Given 2/4/25 1508)   metoclopramide (REGLAN) injection 5 mg (5 mg Intravenous Given 2/4/25 1508)   sodium chloride 0.9 % bolus 1,000 mL (0 mL Intravenous Stopped 2/4/25 1621)     No radiology results for the last day                                                     Medical Decision Making  24-year-old female presents emergency room with complaints of nausea and diarrhea that started last night.  Patient states she had a fever of 101 last night with bodyaches.  Physical exam HEENT is normocephalic and nontraumatic lungs are clear to auscultation bilateral heart regular and without murmur abdomen soft nontender normal bowel sound x 4 quad.  Skin is warm and dry with good turgor.  Extremities are noncyanotic nonedematous.  Vital signs BP is 115/72 temperature is 98.4 heart  rate 79 respirations 20 SpO2 room air is 100%.  ER course urinalysis was obtained 1+ bacteria 3-5 white blood cell 3-5 red blood cell we will treat for possible urinary tract infection due to pregnancy.  Respiratory panel was negative CBC WBC is 10.39 hemoglobin 13.0 hematocrit is 39.6 with platelets of 184.  CMP glucose is 121 BUN 8 creatinine is 0.53 and sodium of 137 with a potassium of 3.6.  Patient received Reglan 5 mg IV 1 L normal saline with good relief of nausea she is experienced no vomiting and in the office she drank an entire Sprite which she had for [employer] and tolerated that p.o. challenge.  She states she felt much better so she was discharged home with a prescription for Reglan for a few days advised to increase fluids with sports drinks antibiotics were sent to the pharmacy she was going to return to emergency room if any symptoms do worsen.  Patient verbalized understanding agree with treatment plan she was discharged home in stable condition.    Problems Addressed:  Acute cystitis without hematuria: complicated acute illness or injury  Nausea and vomiting, unspecified vomiting type: complicated acute illness or injury    Amount and/or Complexity of Data Reviewed  Labs: ordered.    Risk  Prescription drug management.        Final diagnoses:   Nausea and vomiting, unspecified vomiting type   Acute cystitis without hematuria       ED Disposition  ED Disposition       ED Disposition   Discharge    Condition   Stable    Comment   --               She Pickett MD  [address]  [address] [place] [address] [phone]    Schedule an appointment as soon as possible for a visit       [facility] EMERGENCY DEPARTMENT  [facility] [address] [phone]    If symptoms worsen         Medication List        New Prescriptions      cephalexin 500 MG capsule  Commonly known as: KEFLEX  Take 1 capsule by mouth 2 (Two) Times a Day.     metoclopramide 5 MG  tablet  Commonly known as: REGLAN  Take 1 tablet by mouth 3 (Three) Times a Day As Needed (Take only as needed for nausea vomiting).               Where to Get Your Medications        These medications were sent to [facility] [address] [place] [address] [place] - [address] [facility] - [phone]  - [phone]   [address] [facility] [place] [address]      Phone: [phone]   cephalexin 500 MG capsule  metoclopramide 5 MG tablet            Shawn Connell PA-C  02/04/25 7971

## 2025-02-06 DIAGNOSIS — R30.0 DYSURIA: Primary | ICD-10-CM

## 2025-02-18 ENCOUNTER — PATIENT OUTREACH (OUTPATIENT)
Dept: LABOR AND DELIVERY | Facility: HOSPITAL | Age: 25
End: 2025-02-18
Payer: MEDICAID

## 2025-02-18 NOTE — OUTREACH NOTE
[facility]  Enrollment    Current Estimated Gestational Age: 15w0d    Questions/Answers      Flowsheet Row Responses   Would like to participate? --  [welcime enrollment sent]                Ashley Noriega RN  Maternity Nurse Navigator    2/18/2025, 13:44 EST

## 2025-02-19 ENCOUNTER — PATIENT OUTREACH (OUTPATIENT)
Dept: LABOR AND DELIVERY | Facility: HOSPITAL | Age: 25
End: 2025-02-19
Payer: MEDICAID

## 2025-02-19 NOTE — OUTREACH NOTE
[facility]  Unable to Reach    Questions/Answers      Flowsheet Row Responses   Pending Outreach Confirm Patient Interest   Call Attempt Second   Outcome MyChart message sent to patient   Unable to reach comments: mailbox full, unable to leave message          Ashley Noriega RN  Maternity Nurse Navigator    2/19/2025, 18:28 EST

## 2025-02-21 ENCOUNTER — PATIENT OUTREACH (OUTPATIENT)
Dept: LABOR AND DELIVERY | Facility: HOSPITAL | Age: 25
End: 2025-02-21
Payer: MEDICAID

## 2025-02-21 NOTE — OUTREACH NOTE
[facility]  Intake    Current Estimated Gestational Age: 15w3d    Intake Assessment      Flowsheet Row Responses   Best Method for Contacting Cell   Currently Employed Yes  [full time ]   Gender(s) and Name(s) Boy   Do you have a dentist? No  [discussed contacting insurance for dental providers and scheduling healthy dental check up]   Resources Presently Utilizing: WIC (Women, Infant, Children), Other  [discussed at 1yr olds WIC appt to let them know she is now pregnant too]   Other Resources Utilizing: applied for SNAP this week awaiting results   Other: --  [sent in [employer]]   Other Education How to find a dentist            Learning Assessment      Flowsheet Row Responses   Relationship Patient   Learner Name Jeremiah   Does the learner have any barriers to learning? No Barriers   What is the preferred language of the learner for medical teaching? English   Is an  required? No   How does the learner prefer to learn new concepts? Listening, Reading, Pictures/Video          Tobacco, Alcohol, and Drug History     reports that she quit smoking about 1 months ago. Her smoking use included electronic cigarette. She has been exposed to tobacco smoke. She has never used smokeless tobacco.   reports that she does not currently use alcohol.   reports no history of drug use.    Ashley Noriega RN  Maternity Nurse Navigator    2/21/2025, 18:11 EST

## 2025-02-21 NOTE — OUTREACH NOTE
[facility]  Enrollment    Current Estimated Gestational Age: 15w3d    Questions/Answers      Flowsheet Row Responses   Would like to participate? Yes   Date of Intake Visit 02/21/25          Ashley Noriega RN  Maternity Nurse Navigator    2/21/2025, 18:19 EST

## 2025-03-20 ENCOUNTER — APPOINTMENT (OUTPATIENT)
Dept: ULTRASOUND IMAGING | Facility: HOSPITAL | Age: 25
End: 2025-03-20
Payer: MEDICAID

## 2025-03-20 ENCOUNTER — HOSPITAL ENCOUNTER (EMERGENCY)
Facility: HOSPITAL | Age: 25
Discharge: HOME | End: 2025-03-20
Attending: EMERGENCY MEDICINE
Payer: MEDICAID

## 2025-03-20 VITALS
HEIGHT: 62 IN | OXYGEN SATURATION: 99 % | TEMPERATURE: 98.4 F | SYSTOLIC BLOOD PRESSURE: 98 MMHG | DIASTOLIC BLOOD PRESSURE: 54 MMHG | HEART RATE: 74 BPM | WEIGHT: 197 LBS | RESPIRATION RATE: 16 BRPM | BODY MASS INDEX: 36.25 KG/M2

## 2025-03-20 DIAGNOSIS — Z3A.19 19 WEEKS GESTATION OF PREGNANCY: ICD-10-CM

## 2025-03-20 DIAGNOSIS — R10.84 GENERALIZED ABDOMINAL PAIN: Primary | ICD-10-CM

## 2025-03-20 LAB
ALBUMIN SERPL-MCNC: 3.6 G/DL (ref 3.5–5.2)
ALBUMIN/GLOB SERPL: 1.3 G/DL
ALP SERPL-CCNC: 176 U/L (ref 39–117)
ALT SERPL W P-5'-P-CCNC: 41 U/L (ref 1–33)
ANION GAP SERPL CALCULATED.3IONS-SCNC: 9.4 MMOL/L (ref 5–15)
AST SERPL-CCNC: 24 U/L (ref 1–32)
BASOPHILS # BLD AUTO: 0.06 10*3/MM3 (ref 0–0.2)
BASOPHILS NFR BLD AUTO: 0.5 % (ref 0–1.5)
BILIRUB SERPL-MCNC: 0.2 MG/DL (ref 0–1.2)
BILIRUB UR QL STRIP: NEGATIVE
BUN SERPL-MCNC: 4 MG/DL (ref 6–20)
BUN/CREAT SERPL: 9.1 (ref 7–25)
CALCIUM SPEC-SCNC: 8.8 MG/DL (ref 8.6–10.5)
CHLORIDE SERPL-SCNC: 104 MMOL/L (ref 98–107)
CLARITY UR: CLEAR
CO2 SERPL-SCNC: 23.6 MMOL/L (ref 22–29)
COLOR UR: YELLOW
CREAT SERPL-MCNC: 0.44 MG/DL (ref 0.57–1)
DEPRECATED RDW RBC AUTO: 41.2 FL (ref 37–54)
EGFRCR SERPLBLD CKD-EPI 2021: 137 ML/MIN/1.73
EOSINOPHIL # BLD AUTO: 0.08 10*3/MM3 (ref 0–0.4)
EOSINOPHIL NFR BLD AUTO: 0.7 % (ref 0.3–6.2)
ERYTHROCYTE [DISTWIDTH] IN BLOOD BY AUTOMATED COUNT: 12.6 % (ref 12.3–15.4)
GLOBULIN UR ELPH-MCNC: 2.8 GM/DL
GLUCOSE SERPL-MCNC: 108 MG/DL (ref 65–99)
GLUCOSE UR STRIP-MCNC: NEGATIVE MG/DL
HCT VFR BLD AUTO: 30.8 % (ref 34–46.6)
HGB BLD-MCNC: 10.1 G/DL (ref 12–15.9)
HGB UR QL STRIP.AUTO: NEGATIVE
IMM GRANULOCYTES # BLD AUTO: 0.31 10*3/MM3 (ref 0–0.05)
IMM GRANULOCYTES NFR BLD AUTO: 2.8 % (ref 0–0.5)
KETONES UR QL STRIP: NEGATIVE
LEUKOCYTE ESTERASE UR QL STRIP.AUTO: NEGATIVE
LIPASE SERPL-CCNC: 22 U/L (ref 13–60)
LYMPHOCYTES # BLD AUTO: 1.22 10*3/MM3 (ref 0.7–3.1)
LYMPHOCYTES NFR BLD AUTO: 11 % (ref 19.6–45.3)
MCH RBC QN AUTO: 29.5 PG (ref 26.6–33)
MCHC RBC AUTO-ENTMCNC: 32.8 G/DL (ref 31.5–35.7)
MCV RBC AUTO: 90.1 FL (ref 79–97)
MONOCYTES # BLD AUTO: 0.48 10*3/MM3 (ref 0.1–0.9)
MONOCYTES NFR BLD AUTO: 4.3 % (ref 5–12)
NEUTROPHILS NFR BLD AUTO: 8.95 10*3/MM3 (ref 1.7–7)
NEUTROPHILS NFR BLD AUTO: 80.7 % (ref 42.7–76)
NITRITE UR QL STRIP: NEGATIVE
NRBC BLD AUTO-RTO: 0.2 /100 WBC (ref 0–0.2)
PH UR STRIP.AUTO: 6.5 [PH] (ref 5–8)
PLATELET # BLD AUTO: 288 10*3/MM3 (ref 140–450)
PMV BLD AUTO: 11.3 FL (ref 6–12)
POTASSIUM SERPL-SCNC: 3.5 MMOL/L (ref 3.5–5.2)
PROT SERPL-MCNC: 6.4 G/DL (ref 6–8.5)
PROT UR QL STRIP: NEGATIVE
RBC # BLD AUTO: 3.42 10*6/MM3 (ref 3.77–5.28)
SODIUM SERPL-SCNC: 137 MMOL/L (ref 136–145)
SP GR UR STRIP: 1.01 (ref 1–1.03)
UROBILINOGEN UR QL STRIP: ABNORMAL
WBC NRBC COR # BLD AUTO: 11.1 10*3/MM3 (ref 3.4–10.8)

## 2025-03-20 PROCEDURE — 85025 COMPLETE CBC W/AUTO DIFF WBC: CPT | Performed by: EMERGENCY MEDICINE

## 2025-03-20 PROCEDURE — 96374 THER/PROPH/DIAG INJ IV PUSH: CPT

## 2025-03-20 PROCEDURE — 25010000002 ONDANSETRON PER 1 MG: Performed by: EMERGENCY MEDICINE

## 2025-03-20 PROCEDURE — 81003 URINALYSIS AUTO W/O SCOPE: CPT | Performed by: EMERGENCY MEDICINE

## 2025-03-20 PROCEDURE — 76805 OB US >/= 14 WKS SNGL FETUS: CPT

## 2025-03-20 PROCEDURE — 80053 COMPREHEN METABOLIC PANEL: CPT | Performed by: EMERGENCY MEDICINE

## 2025-03-20 PROCEDURE — 99284 EMERGENCY DEPT VISIT MOD MDM: CPT

## 2025-03-20 PROCEDURE — 83690 ASSAY OF LIPASE: CPT | Performed by: EMERGENCY MEDICINE

## 2025-03-20 PROCEDURE — 25810000003 LACTATED RINGERS SOLUTION: Performed by: EMERGENCY MEDICINE

## 2025-03-20 RX ORDER — ONDANSETRON 2 MG/ML
4 INJECTION INTRAMUSCULAR; INTRAVENOUS ONCE
Status: COMPLETED | OUTPATIENT
Start: 2025-03-20 | End: 2025-03-20

## 2025-03-20 RX ORDER — SODIUM CHLORIDE 0.9 % (FLUSH) 0.9 %
10 SYRINGE (ML) INJECTION AS NEEDED
Status: DISCONTINUED | OUTPATIENT
Start: 2025-03-20 | End: 2025-03-20 | Stop reason: HOSPADM

## 2025-03-20 RX ADMIN — ONDANSETRON 4 MG: 2 INJECTION, SOLUTION INTRAMUSCULAR; INTRAVENOUS at 16:43

## 2025-03-20 RX ADMIN — SODIUM CHLORIDE, SODIUM LACTATE, POTASSIUM CHLORIDE, CALCIUM CHLORIDE 1000 ML: 20; 30; 600; 310 INJECTION, SOLUTION INTRAVENOUS at 16:46

## 2025-03-20 NOTE — Clinical Note
[facility] EMERGENCY DEPARTMENT  [address] [facility] [place] [address]  Phone: [phone]    Jeremiah Cade was seen and treated in our emergency department on 3/20/2025.  She may return to work on 03/22/2025.         Thank you for choosing [facility].    Wenceslao Pineda MD       NA: pt. with no stairs at home

## 2025-03-20 NOTE — ED PROVIDER NOTES
Subjective   History of Present Illness  Chief complaint abdominal pain    History of present illness a 24-year-old female states she is 19 weeks pregnant has been having some intermittent abdominal pain since she was 10 weeks pregnant mostly on the left side she states it would come and go she has seen her OB but always figured out what it is.  She states that its gotten worse last couple days she vomited twice.  She has had no bleeding she denies any black or bloody stools she denies any dysuria frequency or trauma no chest pain neck arm jaw pain or shortness of breath.  She is able to eat and drink without difficulty nothing otherwise seems to trigger or make it better or worse it is cramping in nature nonradiating.  No previous abdominal surgeries.  She had a 1 previous pregnancy where she had some second miscarriage but ultimately delivered without problems.  No weight loss no one at home with similar illness.  No foreign travels antibiotic use or recent hospitalization      Review of Systems   Constitutional:  Negative for chills and fever.   Respiratory:  Negative for chest tightness and shortness of breath.    Cardiovascular:  Negative for chest pain.   Gastrointestinal:  Positive for abdominal pain and vomiting. Negative for blood in stool and diarrhea.   Genitourinary:  Negative for difficulty urinating, dysuria and vaginal bleeding.   Musculoskeletal:  Negative for back pain.   Skin:  Negative for rash.   Neurological:  Negative for dizziness and light-headedness.       Past Medical History:   Diagnosis Date    Abdominal bloating     Anxiety     Depression     Eczema     Flatulence     GERD (gastroesophageal reflux disease)     Nausea     PONV (postoperative nausea and vomiting)     after tooth extraction       Allergies   Allergen Reactions    Milk-Related Compounds Diarrhea and Nausea And Vomiting       Past Surgical History:   Procedure Laterality Date    COLONOSCOPY      COLONOSCOPY W/ BIOPSIES   2020    Dr. Agudelo - bx c/w erosions    ENDOSCOPY  2021    Dr. Agudelo -ring at GE junction-dilated.  Gastric congestion consistent with gastritis.  Gastric / esophageal biopsies negative    TOOTH EXTRACTION      x3       Family History   Problem Relation Age of Onset    Hypertension Mother     Drug abuse Mother         Narcotic abuse and illegal substances.    Hypertension Father     Heart attack Father     Drug abuse Father         Narcotic abuse and illegal substances.    Depression Father     Hyperlipidemia Father     Cervical cancer Maternal Grandmother     Cancer Maternal Grandmother         Cervical cancer    Diabetes Paternal Grandfather     Vision loss Paternal Grandfather     Heart attack Paternal Grandmother 40    Birth defects Sister         Clubbed foot.    Birth defects Brother         Clubbed foot.       Social History     Socioeconomic History    Marital status: Significant Other     Spouse name: Feng CISNEROS    Number of children: 1   Tobacco Use    Smoking status: Former     Types: Electronic Cigarette     Quit date: 2024     Years since quittin.2     Passive exposure: Current    Smokeless tobacco: Never   Vaping Use    Vaping status: Former    Devices: Disposable    Passive vaping exposure: Yes   Substance and Sexual Activity    Alcohol use: Not Currently     Comment: socially    Drug use: Never    Sexual activity: Yes     Partners: Male     Birth control/protection: None     Prior to Admission medications    Medication Sig Start Date End Date Taking? Authorizing Provider   cephalexin (KEFLEX) 500 MG capsule Take 1 capsule by mouth 2 (Two) Times a Day. 25   Shawn Connell, DEB   EluRyng 0.12-0.015 MG/24HR vaginal ring INSERT 1 RING VAGINALLY AND LEAVE IN PLACE FOR 3 WEEKS THEN REMOVE FOR 1 RING FREE WEEK, REPEAT  Patient not taking: Reported on 2024 10/25/24   Provider, MD Laila   meclizine (ANTIVERT) 25 MG tablet Take 1 tablet by mouth 3 (Three) Times a  Day As Needed for Dizziness.  Patient not taking: Reported on 12/30/2024    Provider, MD Laila   metoclopramide (REGLAN) 5 MG tablet Take 1 tablet by mouth 3 (Three) Times a Day As Needed (Take only as needed for nausea vomiting). 2/4/25   Shawn Connell PA-C   mupirocin (BACTROBAN) 2 % cream Apply 1 Application topically to the appropriate area as directed 3 (Three) Times a Day.  Patient not taking: Reported on 12/30/2024 8/7/24   She Pickett MD   ondansetron ODT (ZOFRAN-ODT) 4 MG disintegrating tablet Place 1 tablet on the tongue Every 8 (Eight) Hours As Needed for Nausea or Vomiting for up to 15 doses.  Patient not taking: Reported on 12/30/2024 12/26/24   Shanti Hartley APRN   sertraline (ZOLOFT) 50 MG tablet Take 1/2 tab per days for a week, then increase to 1 full tablet  Patient not taking: Reported on 12/30/2024 8/7/24   She Pickett MD          Objective   Physical Exam  Constitutional 24-year-old female awake alert nontoxic-appearing well-appearing triage vital signs reviewed HEENT extraocular muscles are intact pupils equal no photophobia sclera clear the mouth is clear moist no exudate neck supple no adenopathy no meningeal signs lungs were clear no retraction back no CVA tenderness heart regular without murmur rub abdomen soft uterus appropriate large there is no tenderness anywhere no rebound no guarding good bowel sounds throughout no other masses noted other than appropriate size uterus.  Extremities there is no edema anywhere.  No cords or Homans' sign pulses equal in all extremities skin warm dry without rash neurologic she is awake alert nontoxic-appearing well-appearing otherwise  Procedures           ED Course      Results for orders placed or performed during the hospital encounter of 03/20/25   Comprehensive Metabolic Panel    Collection Time: 03/20/25  4:46 PM    Specimen: Blood   Result Value Ref Range    Glucose 108 (H) 65 - 99 mg/dL    BUN 4 (L) 6 - 20 mg/dL     Creatinine 0.44 (L) 0.57 - 1.00 mg/dL    Sodium 137 136 - 145 mmol/L    Potassium 3.5 3.5 - 5.2 mmol/L    Chloride 104 98 - 107 mmol/L    CO2 23.6 22.0 - 29.0 mmol/L    Calcium 8.8 8.6 - 10.5 mg/dL    Total Protein 6.4 6.0 - 8.5 g/dL    Albumin 3.6 3.5 - 5.2 g/dL    ALT (SGPT) 41 (H) 1 - 33 U/L    AST (SGOT) 24 1 - 32 U/L    Alkaline Phosphatase 176 (H) 39 - 117 U/L    Total Bilirubin 0.2 0.0 - 1.2 mg/dL    Globulin 2.8 gm/dL    A/G Ratio 1.3 g/dL    BUN/Creatinine Ratio 9.1 7.0 - 25.0    Anion Gap 9.4 5.0 - 15.0 mmol/L    eGFR 137.0 >60.0 mL/min/1.73   Lipase    Collection Time: 03/20/25  4:46 PM    Specimen: Blood   Result Value Ref Range    Lipase 22 13 - 60 U/L   Urinalysis With Culture If Indicated - Urine, Clean Catch    Collection Time: 03/20/25  4:46 PM    Specimen: Urine, Clean Catch   Result Value Ref Range    Color, UA Yellow Yellow, Straw    Appearance, UA Clear Clear    pH, UA 6.5 5.0 - 8.0    Specific Gravity, UA 1.012 1.005 - 1.030    Glucose, UA Negative Negative    Ketones, UA Negative Negative    Bilirubin, UA Negative Negative    Blood, UA Negative Negative    Protein, UA Negative Negative    Leuk Esterase, UA Negative Negative    Nitrite, UA Negative Negative    Urobilinogen, UA 2.0 E.U./dL (A) 0.2 - 1.0 E.U./dL   CBC Auto Differential    Collection Time: 03/20/25  4:46 PM    Specimen: Blood   Result Value Ref Range    WBC 11.10 (H) 3.40 - 10.80 10*3/mm3    RBC 3.42 (L) 3.77 - 5.28 10*6/mm3    Hemoglobin 10.1 (L) 12.0 - 15.9 g/dL    Hematocrit 30.8 (L) 34.0 - 46.6 %    MCV 90.1 79.0 - 97.0 fL    MCH 29.5 26.6 - 33.0 pg    MCHC 32.8 31.5 - 35.7 g/dL    RDW 12.6 12.3 - 15.4 %    RDW-SD 41.2 37.0 - 54.0 fl    MPV 11.3 6.0 - 12.0 fL    Platelets 288 140 - 450 10*3/mm3    Neutrophil % 80.7 (H) 42.7 - 76.0 %    Lymphocyte % 11.0 (L) 19.6 - 45.3 %    Monocyte % 4.3 (L) 5.0 - 12.0 %    Eosinophil % 0.7 0.3 - 6.2 %    Basophil % 0.5 0.0 - 1.5 %    Immature Grans % 2.8 (H) 0.0 - 0.5 %    Neutrophils,  Absolute 8.95 (H) 1.70 - 7.00 10*3/mm3    Lymphocytes, Absolute 1.22 0.70 - 3.10 10*3/mm3    Monocytes, Absolute 0.48 0.10 - 0.90 10*3/mm3    Eosinophils, Absolute 0.08 0.00 - 0.40 10*3/mm3    Basophils, Absolute 0.06 0.00 - 0.20 10*3/mm3    Immature Grans, Absolute 0.31 (H) 0.00 - 0.05 10*3/mm3    nRBC 0.2 0.0 - 0.2 /100 WBC     US Ob 14 + Weeks Single or First Gestation  Result Date: 3/20/2025  Impression: Single intrauterine pregnancy with fetal heart rate detected at 137 bpm. Estimated gestational age based on ultrasound measurements of 19 weeks, 2 days corresponds with clinical gestational age of 18 weeks, 5 days. Electronically Signed: Karlene Maldonado  3/20/2025 7:08 PM EDT  Workstation ID: [id number]    Medications   sodium chloride 0.9 % flush 10 mL (has no administration in time range)   lactated ringers bolus 1,000 mL (0 mL Intravenous Stopped 3/20/25 1911)   ondansetron (ZOFRAN) injection 4 mg (4 mg Intravenous Given 3/20/25 1643)                                                        Medical Decision Making  Medical decision making.  IV established monitor placement review of sinus rhythm.  Patient in great fetal heart tones but was given a liter of lactated Ringer's Zofran 4 mg IV.  Labs obtained by independent review comprehensive metabolic profile was unremarkable.  Liver lipase normal CBC unremarkable other hemoglobin 10.1 white count 11.1 urine was negative.  Ultrasound obtained read by radiology report reviewed by me normal living intrauterine pregnancy at 19 weeks 2 days and a heart rate of 137 no other complications noted currently breech presentation.  The patient repeat examination at 7:20 PM abdomen soft nontender good bowel sounds no peritoneal findings or other masses.  Patient looks well vital signs stable I do not see any suggest acute appendicitis acute cholecystitis ischemic bowel bowel obstruction miscarriage ectopic pregnancy ruptured placenta pneumonia DVT pulmonary embolism based on my  history and physical clinical signs although not a complete list of all possibilities.  Patient is had this for several weeks.  She has appoint with her OB/GYN on Monday.  I do not see any need for any further imaging at this point.  We talked about the findings what to return for and was discharged home in stable unremarkable ER course.    Problems Addressed:  19 weeks gestation of pregnancy: complicated acute illness or injury  Generalized abdominal pain: complicated acute illness or injury    Amount and/or Complexity of Data Reviewed  Labs: ordered. Decision-making details documented in ED Course.  Radiology: ordered.    Risk  Prescription drug management.        Final diagnoses:   Generalized abdominal pain   19 weeks gestation of pregnancy       ED Disposition  ED Disposition       ED Disposition   Discharge    Condition   Stable    Comment   --               She Pickett MD  [address]  [address]  [place] [id number] [phone]    In 1 day           Medication List      No changes were made to your prescriptions during this visit.            Wenceslao Pineda MD  03/20/25 [id number]

## 2025-03-20 NOTE — DISCHARGE INSTRUCTIONS
Rest plenty of fluids Tylenol for pain  Follow-up with your OB/GYN tomorrow.  Return for increasing pain vomiting vomiting blood black or bloody stool vaginal bleeding dizziness passing out chest pain neck arm jaw pain shortness of breath or any other new or worsening problems or concerns return immediately to the ER.

## 2025-04-24 NOTE — PROGRESS NOTES
Subjective: Abnormal Pituitary     Patient ID: Jeremiah Cade is a 24 y.o. female.    CHIEF COMPLAINT:- Pituitary adenoma     History of Present Illness Ms Cade is Pregnant and states that she is having no headaches.  She reports some episodes of blurry vision in the past but has not seen ophthalmology.  She states that she has been having some small headaches but states they are minimal and infrequent.    2023 she started having random small headaches, in 11--24 she had MRI done and found small mass on her pituitary gland.       She was referred by Dr. Pickett for chronic intractable headaches however the patient states she is not having chronic intractable headaches.      She also refused to undergo any neuro exam.    I notified the patient I could refer her to ophthalmology for her episodes of blurry vision.  I notified the patient I could refer her to neurosurgery who could follow her for pituitary adenoma.       MRI PITUITARY W WO CONTRAST   Date of Exam: 11/22/2024 5:21 PM EST   Indication: suspect empty sella syndrome.erin:   1. Mild punctate subcortical white matter hyperintensities are nonspecific but may represent the sequela of migraine headaches  2. Small T2 hyperintensity behind the anterior pituitary gland without enhancement has the appearance suggestive of a small Rathke cleft cyst. Differential diagnosis would include a small cystic pituitary microadenoma. Clinical and laboratory correlation   are recommended.     Electronically Signed: Alexandra Moreau MD    11/23/2024 10:04 AM EST     The following portions of the patient's history were reviewed and updated as appropriate: allergies, current medications, past family history, past medical history, past social history, past surgical history and problem list.      Family History   Problem Relation Age of Onset    Hypertension Mother     Drug abuse Mother         Narcotic abuse and illegal substances.    Hypertension Father     Heart attack Father      Drug abuse Father         Narcotic abuse and illegal substances.    Depression Father     Hyperlipidemia Father     Cervical cancer Maternal Grandmother     Cancer Maternal Grandmother         Cervical cancer    Diabetes Paternal Grandfather     Vision loss Paternal Grandfather     Heart attack Paternal Grandmother 40    Birth defects Sister         Clubbed foot.    Birth defects Brother         Clubbed foot.       Past Medical History:   Diagnosis Date    Abdominal bloating     Anxiety     Depression     Eczema     Flatulence     GERD (gastroesophageal reflux disease)     Nausea     PONV (postoperative nausea and vomiting)     after tooth extraction       Social History     Socioeconomic History    Marital status: Significant Other     Spouse name: Feng CISNEROS    Number of children: 1   Tobacco Use    Smoking status: Former     Types: Electronic Cigarette     Quit date: 2024     Years since quittin.3     Passive exposure: Current    Smokeless tobacco: Never   Vaping Use    Vaping status: Former    Devices: Disposable    Passive vaping exposure: Yes   Substance and Sexual Activity    Alcohol use: Not Currently     Comment: socially    Drug use: Never    Sexual activity: Yes     Partners: Male     Birth control/protection: None         Current Outpatient Medications:     prenatal vitamin (prenatal, CLASSIC, vitamin) tablet, Take  by mouth Daily., Disp: , Rfl:     cephalexin (KEFLEX) 500 MG capsule, Take 1 capsule by mouth 2 (Two) Times a Day. (Patient not taking: Reported on 2025), Disp: 14 capsule, Rfl: 0    Diclegis 10-10 MG tablet delayed-release EC tablet, TAKE 1 TABLET BY MOUTH IN THE MORNING AND IN THE EVENING AS NEEDED (Patient not taking: Reported on 2025), Disp: , Rfl:     EluRyng 0.12-0.015 MG/24HR vaginal ring, , Disp: , Rfl:     meclizine (ANTIVERT) 25 MG tablet, Take 1 tablet by mouth 3 (Three) Times a Day As Needed for Dizziness. (Patient not taking: Reported on 2025),  Disp: , Rfl:     metoclopramide (REGLAN) 5 MG tablet, Take 1 tablet by mouth 3 (Three) Times a Day As Needed (Take only as needed for nausea vomiting). (Patient not taking: Reported on 4/28/2025), Disp: 12 tablet, Rfl: 0    mupirocin (BACTROBAN) 2 % cream, Apply 1 Application topically to the appropriate area as directed 3 (Three) Times a Day. (Patient not taking: Reported on 4/28/2025), Disp: 30 g, Rfl: 1    ondansetron ODT (ZOFRAN-ODT) 4 MG disintegrating tablet, Place 1 tablet on the tongue Every 8 (Eight) Hours As Needed for Nausea or Vomiting for up to 15 doses. (Patient not taking: Reported on 4/28/2025), Disp: 15 tablet, Rfl: 0    sertraline (ZOLOFT) 50 MG tablet, Take 1/2 tab per days for a week, then increase to 1 full tablet (Patient not taking: Reported on 4/28/2025), Disp: 90 tablet, Rfl: 1    Review of Systems   Constitutional:  Positive for fatigue.   HENT:  Positive for dental problem.    Gastrointestinal:  Positive for constipation and diarrhea.   Endocrine: Positive for cold intolerance and heat intolerance.   Allergic/Immunologic: Positive for food allergies.   Neurological:  Positive for speech difficulty, light-headedness, numbness and headaches.   Psychiatric/Behavioral:  Positive for agitation and sleep disturbance. The patient is nervous/anxious.    All other systems reviewed and are negative.       I have reviewed ROS completed by medical assistant.     Objective: Blurred vision pituitary adenoma    Neurological Exam The patient refused any neuroexam      Assessment/Plan:     Discussion: The patient will be referred to ophthalmology for her blurred vision.  For the pituitary adenoma I will refer her to neurosurgery.  For her chronic headaches that have resolved I will refer her back to Dr. Pickett.      If she should need to come back to neurology for full evaluation she will need to be prepared to undergo a neuro examination which is part of a normal neurology consult and  workup.    Diagnoses and all orders for this visit:    1. Visual changes (Primary)  -     Ambulatory Referral to Ophthalmology    2. Pituitary adenoma  -     Ambulatory Referral to Neurosurgery        Return if symptoms worsen or fail to improve, for Would need a full NEW Patient Exam .    I spent 25 minutes caring for Jeremiah on this date of service. This time includes time spent by me in the following activities: preparing for the visit, reviewing tests, obtaining and/or reviewing a separately obtained history, counseling and educating the patient/family/caregiver, referring and communicating with other health care professionals, and documenting information in the medical record.      This document has been electronically signed by Kate KHAN on April 28, 2025 14:35 EDT

## 2025-04-28 ENCOUNTER — OFFICE VISIT (OUTPATIENT)
Dept: NEUROLOGY | Facility: CLINIC | Age: 25
End: 2025-04-28
Payer: MEDICAID

## 2025-04-28 VITALS
DIASTOLIC BLOOD PRESSURE: 71 MMHG | BODY MASS INDEX: 37.36 KG/M2 | HEART RATE: 75 BPM | WEIGHT: 203 LBS | SYSTOLIC BLOOD PRESSURE: 106 MMHG | HEIGHT: 62 IN

## 2025-04-28 DIAGNOSIS — H53.9 VISUAL CHANGES: Primary | ICD-10-CM

## 2025-04-28 DIAGNOSIS — D35.2 PITUITARY ADENOMA: ICD-10-CM

## 2025-04-28 PROCEDURE — 99213 OFFICE O/P EST LOW 20 MIN: CPT | Performed by: NURSE PRACTITIONER

## 2025-04-28 PROCEDURE — 1159F MED LIST DOCD IN RCRD: CPT | Performed by: NURSE PRACTITIONER

## 2025-04-28 PROCEDURE — 1160F RVW MEDS BY RX/DR IN RCRD: CPT | Performed by: NURSE PRACTITIONER

## 2025-04-28 RX ORDER — DOXYLAMINE SUCCINATE AND PYRIDOXINE HYDROCHLORIDE 10; 10 MG/1; MG/1
TABLET, DELAYED RELEASE ORAL
COMMUNITY
Start: 2025-01-14

## 2025-04-28 RX ORDER — PRENATAL VIT NO.126/IRON/FOLIC 28MG-0.8MG
TABLET ORAL DAILY
COMMUNITY

## 2025-04-30 ENCOUNTER — PATIENT ROUNDING (BHMG ONLY) (OUTPATIENT)
Dept: NEUROLOGY | Facility: CLINIC | Age: 25
End: 2025-04-30
Payer: MEDICAID

## 2025-05-01 NOTE — PROGRESS NOTES
"Subjective   History of Present Illness: Jeremiah Cade is a 24 y.o. female is being seen for consultation today at the request of Kate Truong, * for pituitary adenoma.  Patient is 26 weeks pregnant.  Patient describes a 3-year history of random headaches, random blurry vision, and at times some facial tingling that is not consistently on 1 side but switches sides.  She also feels like she is confused at times and does not have good memory.  She is not describing any acute neurologic deficits.  She did undergo MRI last year with possible pituitary lesion and been evaluated with neurology for infrequent small headaches.  Patient has undergone endocrine testing also back in November of last year with no abnormalities.  She is not diabetic.      History of Present Illness    The following portions of the patient's history were reviewed and updated as appropriate: allergies, current medications, past family history, past medical history, past social history, past surgical history, and problem list.    Review of Systems   Constitutional:  Positive for fatigue.   Eyes:  Positive for visual disturbance (blurry/pressure in head).   Cardiovascular: Negative.    Gastrointestinal: Negative.    Endocrine: Negative.    Genitourinary: Negative.    Musculoskeletal: Negative.    Skin: Negative.    Allergic/Immunologic: Negative.    Neurological:  Positive for headaches (mild).   Psychiatric/Behavioral:  Positive for agitation, confusion (word articulation), decreased concentration and sleep disturbance.    All other systems reviewed and are negative.      Objective     ./70 (BP Location: Right arm, Patient Position: Sitting)   Pulse 78   Ht 157.5 cm (62\")   Wt 93.4 kg (206 lb)   LMP 10/02/2024 (Approximate) Comment: G 2, P 1 A 0  SpO2 97%   BMI 37.68 kg/m²    Body mass index is 37.68 kg/m².    Vitals:    05/06/25 0859   PainSc: 3   Comment: Headaches/ increase to 5 sometimes          Physical " Exam  Neurological Exam  Alert and oriented by 3  Speech is intact and coherent articulate with good content and production  Cranial nerves III through XII are grossly intact with pupils symmetric and reactive and no gaze paresis or nystagmus  Sensation is intact to soft touch throughout   Motor strength is 5/5 in both upper and lower extremities with no focal motor deficits  Gait is nonantalgic  Heel toe walking is intact  No dysmetria or dysdiadochokinesia      Assessment & Plan   Independent Review of Radiographic Studies:      I personally reviewed the images from the following studies.    MRI pituitary 11/22/24    Small T2 hyperintensity behind the anterior pituitary gland without enhancement has the appearance suggestive of a small Rathke cleft cyst. Differential diagnosis would include a small cystic pituitary microadenoma.  No suprasellar or parasellar mass.  Optic chiasm appears normal.    Medical Decision Making:      Jeremiah Castle a 24 y.o. female being seen today as a new patient for small nonenhancing lesion around the pituitary gland.  Differential diagnosis small cystic pituitary microadenoma versus small Rathke cleft cyst.  Her physical exam was reassuring with no concerning neurologic deficits.  She has undergone endocrine testing back in November with no overt endocrine dysfunction.  She does have ophthalmologic referral pending.  This is likely an incidental finding but do feel that patient needs to be followed with some serial imaging and would recommend obtaining MRI in September after the birth of her child.  We could consider repeating endocrine labs at that time and have requested patient to notify the office when her ophthalmologic testing has been completed.  We will follow-up after MRI for any further recommendations.  I encouraged to call the office in the interim with any questions or concerns.  Patient is agreeable to plan of care and wishes to proceed.  Advanced imaging (i.e. MRI,  CT scan, or CT myelogram) was ordered today during your office visit. Once this order is approved by insurance, our office will call you ASAP in regards to appointment details for your test. If there is a waiting time on this, it is because we are waiting on approval from your insurance.     Please schedule a follow-up appointment to discuss your test results in the office.    For the fastest turn around time for your advanced imaging to be reviewed by a provider and uploaded into our system, pick a [facility] facility.     If you choose non-[facility] facility, you will be responsible for bringing the images on a CD to your follow-up appointment. If you forget the CD at the time of your appointment, you may be asked to reschedule.             Diagnoses and all orders for this visit:    1. Pituitary lesion (Primary)  -     MRI Pituitary With & Without Contrast; Future      Return in about 4 months (around 9/6/2025) for Recheck.    This patient was examined wearing appropriate personal protective equipment.     Jeremiahnicolas Cade  reports that she quit smoking about 4 months ago. Her smoking use included electronic cigarette and cigarettes. She started smoking about 7 years ago. She has been exposed to tobacco smoke. She has never used smokeless tobacco.     Body mass index is 37.68 kg/m².      Patient's blood pressure was reviewed.  Recommendations for  a low-salt diet and exercise to maintain/improve BP in addition to taking any presribed medications.             Maty Durbin DNP, APRN  05/06/25  09:43 EDT

## 2025-05-06 ENCOUNTER — OFFICE VISIT (OUTPATIENT)
Dept: NEUROSURGERY | Facility: CLINIC | Age: 25
End: 2025-05-06
Payer: MEDICAID

## 2025-05-06 VITALS
HEIGHT: 62 IN | DIASTOLIC BLOOD PRESSURE: 70 MMHG | OXYGEN SATURATION: 97 % | WEIGHT: 206 LBS | BODY MASS INDEX: 37.91 KG/M2 | HEART RATE: 78 BPM | SYSTOLIC BLOOD PRESSURE: 116 MMHG

## 2025-05-06 DIAGNOSIS — E23.7 PITUITARY LESION: Primary | ICD-10-CM

## 2025-05-06 RX ORDER — FERROUS SULFATE 324(65)MG
324 TABLET, DELAYED RELEASE (ENTERIC COATED) ORAL
COMMUNITY

## 2025-05-09 ENCOUNTER — PATIENT ROUNDING (BHMG ONLY) (OUTPATIENT)
Dept: NEUROSURGERY | Facility: CLINIC | Age: 25
End: 2025-05-09
Payer: MEDICAID

## 2025-05-21 ENCOUNTER — PATIENT OUTREACH (OUTPATIENT)
Dept: LABOR AND DELIVERY | Facility: HOSPITAL | Age: 25
End: 2025-05-21
Payer: MEDICAID

## 2025-05-21 NOTE — OUTREACH NOTE
[facility]  Check-In    Current Estimated Gestational Age: 28w1d      Questions/Answers      Flowsheet Row Responses   Best Method for Contacting Cell   Demographics Reviewed Yes   Currently Employed Yes  [reduced hours to part time]   Able to keep appointments as scheduled Yes   Gender(s) and Name(s) Boy   Baby Active/Feeling Fetal Movemen Yes   How are you presently feeling? good          States she wants to schedule virtual tour but wants to wait a few more weeks, she will sent [employer] message when she is ready to schedule  Ashley Noriega RN  Maternity Nurse Navigator    5/21/2025, 17:40 EDT

## 2025-06-24 ENCOUNTER — PATIENT OUTREACH (OUTPATIENT)
Dept: LABOR AND DELIVERY | Facility: HOSPITAL | Age: 25
End: 2025-06-24
Payer: MEDICAID

## 2025-06-24 NOTE — OUTREACH NOTE
[facility]  Check-In    Current Estimated Gestational Age: 33w0d      Questions/Answers      Flowsheet Row Responses   Best Method for Contacting Cell   Demographics Reviewed Yes   Currently Employed Yes   Do you have any questions related to your care experience, your pregnancy, plans for delivery, any concerns, etc? Yes   Question schedule antonella Noriega RN  Maternity Nurse Navigator    6/24/2025, 14:59 EDT

## 2025-06-25 ENCOUNTER — PATIENT OUTREACH (OUTPATIENT)
Dept: LABOR AND DELIVERY | Facility: HOSPITAL | Age: 25
End: 2025-06-25
Payer: MEDICAID

## 2025-06-25 NOTE — OUTREACH NOTE
[facility]  Check-In    Current Estimated Gestational Age: 33w1d      Questions/Answers      Flowsheet Row Responses   Best Method for Contacting Cell   Demographics Reviewed Yes   Currently Employed Yes   Able to keep appointments as scheduled Yes   Gender(s) and Name(s) boy   Baby Active/Feeling Fetal Movemen Yes   How are you presently feeling? good   Supplies ready for baby Breast Pump, Car Seat, Clothing, Crib, Diapers, Feeding Supplies   Resource/Environmental Concerns None   Other Education Other   Other Education Comment virtual tour, coming to hospital in labor through ED            Ashley Noriega RN  Maternity Nurse Navigator    6/25/2025, 13:19 EDT

## 2025-07-07 ENCOUNTER — HOSPITAL ENCOUNTER (OUTPATIENT)
Facility: HOSPITAL | Age: 25
Discharge: HOME | End: 2025-07-08
Attending: OBSTETRICS & GYNECOLOGY | Admitting: STUDENT IN AN ORGANIZED HEALTH CARE EDUCATION/TRAINING PROGRAM
Payer: MEDICAID

## 2025-07-07 PROCEDURE — G0463 HOSPITAL OUTPT CLINIC VISIT: HCPCS

## 2025-07-08 VITALS
SYSTOLIC BLOOD PRESSURE: 121 MMHG | RESPIRATION RATE: 16 BRPM | DIASTOLIC BLOOD PRESSURE: 68 MMHG | HEART RATE: 77 BPM | TEMPERATURE: 98.9 F | HEIGHT: 62 IN | WEIGHT: 209.44 LBS | BODY MASS INDEX: 38.54 KG/M2

## 2025-07-08 LAB
BILIRUB UR QL STRIP: NEGATIVE
CLARITY UR: CLEAR
COLOR UR: YELLOW
GLUCOSE UR STRIP-MCNC: NEGATIVE MG/DL
HGB UR QL STRIP.AUTO: NEGATIVE
KETONES UR QL STRIP: NEGATIVE
LEUKOCYTE ESTERASE UR QL STRIP.AUTO: NEGATIVE
NITRITE UR QL STRIP: NEGATIVE
PH UR STRIP.AUTO: 7 [PH] (ref 5–8)
PROT UR QL STRIP: NEGATIVE
SP GR UR STRIP: 1.01 (ref 1–1.03)
UROBILINOGEN UR QL STRIP: NORMAL

## 2025-07-08 PROCEDURE — 59025 FETAL NON-STRESS TEST: CPT

## 2025-07-08 PROCEDURE — 87086 URINE CULTURE/COLONY COUNT: CPT | Performed by: STUDENT IN AN ORGANIZED HEALTH CARE EDUCATION/TRAINING PROGRAM

## 2025-07-08 PROCEDURE — 81003 URINALYSIS AUTO W/O SCOPE: CPT | Performed by: STUDENT IN AN ORGANIZED HEALTH CARE EDUCATION/TRAINING PROGRAM

## 2025-07-08 RX ORDER — PANTOPRAZOLE SODIUM 40 MG/1
40 TABLET, DELAYED RELEASE ORAL ONCE
Status: COMPLETED | OUTPATIENT
Start: 2025-07-08 | End: 2025-07-08

## 2025-07-08 RX ADMIN — PANTOPRAZOLE SODIUM 40 MG: 40 TABLET, DELAYED RELEASE ORAL at 00:50

## 2025-07-08 NOTE — NURSING NOTE
Pt presented to OB with complaints of elevated BP's recorded (161/95, and 149/89 with wrist cuff), and blurred vision at home.  34 6/7weeks , denies blurred vision, SOA, increased swelling, RUQ pain, CTXs, LOF, vaginal bleeding, HA, pain, or N/V.  States she has had epigastric soreness, and diarrhea x 2 days, hx GERD.  Pt placed on monitor, rest for 10-15 min prior to BP recorded.  States active fetal movemet, and positive results with Fetal Kick Counts.  Plan of care continued.

## 2025-07-08 NOTE — NON STRESS TEST
Obstetrical Non-stress Test Interpretation     Name:  Jeremiah Cade  MRN: [id number]    25 y.o. female  at 35w0d    Indication: pregnant, increased BP       Baseline: Normal 110-160 bpm  Variability:   Moderate/Normal (amplitude 6-25 bpm)  Accelerations: Present (32 weeks+) 15 x 15 bpm  Decelerations: Absent   Contractions:  irritability      Impression:  Category I       Juju Riley RN  2025  00:37 EDT

## 2025-07-08 NOTE — DISCHARGE INSTR - APPOINTMENTS
Continue to follow up with Dr Rogel on Monday 7/14/25 at 1pm.  Bring BP cuff to appointment  Bring BP's from primary provider to OB provider

## 2025-07-09 LAB — BACTERIA SPEC AEROBE CULT: NO GROWTH

## 2025-07-14 LAB — EXTERNAL GROUP B STREP ANTIGEN: NEGATIVE

## 2025-07-16 ENCOUNTER — APPOINTMENT (OUTPATIENT)
Dept: ULTRASOUND IMAGING | Facility: HOSPITAL | Age: 25
End: 2025-07-16
Payer: MEDICAID

## 2025-07-16 ENCOUNTER — HOSPITAL ENCOUNTER (OUTPATIENT)
Facility: HOSPITAL | Age: 25
Discharge: HOME | End: 2025-07-16
Attending: OBSTETRICS & GYNECOLOGY | Admitting: STUDENT IN AN ORGANIZED HEALTH CARE EDUCATION/TRAINING PROGRAM
Payer: MEDICAID

## 2025-07-16 VITALS
RESPIRATION RATE: 17 BRPM | HEART RATE: 91 BPM | OXYGEN SATURATION: 96 % | DIASTOLIC BLOOD PRESSURE: 62 MMHG | SYSTOLIC BLOOD PRESSURE: 109 MMHG

## 2025-07-16 PROBLEM — Z34.90 CURRENTLY PREGNANT: Status: ACTIVE | Noted: 2025-07-16

## 2025-07-16 LAB
BACTERIA UR QL AUTO: NORMAL /HPF
BILIRUB UR QL STRIP: NEGATIVE
CLARITY UR: CLEAR
COLOR UR: YELLOW
GLUCOSE UR STRIP-MCNC: NEGATIVE MG/DL
HGB UR QL STRIP.AUTO: NEGATIVE
HYALINE CASTS UR QL AUTO: NORMAL /LPF
KETONES UR QL STRIP: NEGATIVE
LEUKOCYTE ESTERASE UR QL STRIP.AUTO: ABNORMAL
NITRITE UR QL STRIP: NEGATIVE
PH UR STRIP.AUTO: 7.5 [PH] (ref 5–8)
PROT UR QL STRIP: NEGATIVE
RBC # UR STRIP: NORMAL /HPF
REF LAB TEST METHOD: NORMAL
SP GR UR STRIP: <=1.005 (ref 1–1.03)
SQUAMOUS #/AREA URNS HPF: NORMAL /HPF
UROBILINOGEN UR QL STRIP: ABNORMAL
WBC # UR STRIP: NORMAL /HPF

## 2025-07-16 PROCEDURE — 87086 URINE CULTURE/COLONY COUNT: CPT | Performed by: STUDENT IN AN ORGANIZED HEALTH CARE EDUCATION/TRAINING PROGRAM

## 2025-07-16 PROCEDURE — 76819 FETAL BIOPHYS PROFIL W/O NST: CPT

## 2025-07-16 PROCEDURE — G0463 HOSPITAL OUTPT CLINIC VISIT: HCPCS

## 2025-07-16 PROCEDURE — 81001 URINALYSIS AUTO W/SCOPE: CPT | Performed by: STUDENT IN AN ORGANIZED HEALTH CARE EDUCATION/TRAINING PROGRAM

## 2025-07-17 ENCOUNTER — PATIENT OUTREACH (OUTPATIENT)
Dept: LABOR AND DELIVERY | Facility: HOSPITAL | Age: 25
End: 2025-07-17
Payer: MEDICAID

## 2025-07-17 NOTE — NON STRESS TEST
Obstetrical Non-stress Test Interpretation     Name:  Jeremiah Cade  MRN: [id number]    25 y.o. female  at 36w1d    Indication: NST      Fetal Assessment  Fetal Movement: active  Fetal HR Assessment Method: external  Fetal HR (beats/min): 135  Fetal HR Baseline: normal range  Fetal HR Variability: moderate (amplitude range 6 to 25 bpm)  Fetal HR Accelerations: greater than/equal to 15 bpm, lasting at least 15 seconds  Fetal HR Decelerations: absent  Sinusoidal Pattern Present: absent  Fetal HR Tracing Category: Category I    /62   Pulse 91   Resp 17   LMP 10/02/2024 (Approximate) Comment: G 2, P 1 A 0  SpO2 96%     Reason for test:    Date of Test: 2025  Time frame of test: 6147-9134  RN NST Interpretation: Reactive       Na Sheomaker RN  2025

## 2025-07-17 NOTE — SIGNIFICANT NOTE
Spoke with Dr. Sanchez at this time regarding patient. Pt  36/1 weeks came in with decreased fetal movement. Patient states she hasn't felt baby move much today, but has felt baby since being placed on monitor. Patient denies any contractions. Baby category 1 on monitor. Cervix 1-2cm 50%. Orders given for bpp and UA.

## 2025-07-18 LAB — BACTERIA SPEC AEROBE CULT: NO GROWTH

## 2025-07-23 ENCOUNTER — PATIENT OUTREACH (OUTPATIENT)
Dept: LABOR AND DELIVERY | Facility: HOSPITAL | Age: 25
End: 2025-07-23
Payer: MEDICAID

## 2025-07-23 NOTE — OUTREACH NOTE
[facility]  Check-In    Current Estimated Gestational Age: 37w1d      Questions/Answers      Flowsheet Row Responses   Best Method for Contacting Cell   Demographics Reviewed Yes   Gender(s) and Name(s) boy- deciding on name   Baby Active/Feeling Fetal Movemen Yes   How are you presently feeling? good, ready   Questions regarding prenatal visits or tests to be ordered? No   Supplies ready for baby Breast Pump, Car Seat, Clothing, Crib, Diapers, Feeding Supplies   Do you have any questions related to your care experience, your pregnancy, plans for delivery, any concerns, etc? No          States doing well.   May be induced 8/8/25 but no confirmed date yet  Reviewed if ctx, LOF, bleeding, decrease fm come to hospital ED asap she VU, Reviewed coming to hospital in labor through ED    Ashley Noriega RN  Maternity Nurse Navigator    7/23/2025, 17:06 EDT

## 2025-08-07 ENCOUNTER — HOSPITAL ENCOUNTER (OUTPATIENT)
Dept: LABOR AND DELIVERY | Facility: HOSPITAL | Age: 25
Discharge: HOME | End: 2025-08-07
Payer: MEDICAID

## 2025-08-07 ENCOUNTER — PREP FOR SURGERY (OUTPATIENT)
Dept: OTHER | Facility: HOSPITAL | Age: 25
End: 2025-08-07
Payer: MEDICAID

## 2025-08-07 ENCOUNTER — HOSPITAL ENCOUNTER (INPATIENT)
Facility: HOSPITAL | Age: 25
LOS: 2 days | Discharge: HOME | End: 2025-08-09
Attending: OBSTETRICS & GYNECOLOGY | Admitting: OBSTETRICS & GYNECOLOGY
Payer: MEDICAID

## 2025-08-07 PROBLEM — Z34.90 ENCOUNTER FOR INDUCTION OF LABOR: Status: ACTIVE | Noted: 2025-08-07

## 2025-08-07 LAB
BASOPHILS # BLD AUTO: 0.02 10*3/MM3 (ref 0–0.2)
BASOPHILS NFR BLD AUTO: 0.2 % (ref 0–1.5)
DEPRECATED RDW RBC AUTO: 49.1 FL (ref 37–54)
EOSINOPHIL # BLD AUTO: 0.05 10*3/MM3 (ref 0–0.4)
EOSINOPHIL NFR BLD AUTO: 0.6 % (ref 0.3–6.2)
ERYTHROCYTE [DISTWIDTH] IN BLOOD BY AUTOMATED COUNT: 15.2 % (ref 12.3–15.4)
HCT VFR BLD AUTO: 35.5 % (ref 34–46.6)
HGB BLD-MCNC: 11.2 G/DL (ref 12–15.9)
IMM GRANULOCYTES # BLD AUTO: 0.05 10*3/MM3 (ref 0–0.05)
IMM GRANULOCYTES NFR BLD AUTO: 0.6 % (ref 0–0.5)
LYMPHOCYTES # BLD AUTO: 1.34 10*3/MM3 (ref 0.7–3.1)
LYMPHOCYTES NFR BLD AUTO: 14.9 % (ref 19.6–45.3)
MCH RBC QN AUTO: 27.9 PG (ref 26.6–33)
MCHC RBC AUTO-ENTMCNC: 31.5 G/DL (ref 31.5–35.7)
MCV RBC AUTO: 88.5 FL (ref 79–97)
MONOCYTES # BLD AUTO: 0.52 10*3/MM3 (ref 0.1–0.9)
MONOCYTES NFR BLD AUTO: 5.8 % (ref 5–12)
NEUTROPHILS NFR BLD AUTO: 7.03 10*3/MM3 (ref 1.7–7)
NEUTROPHILS NFR BLD AUTO: 77.9 % (ref 42.7–76)
NRBC BLD AUTO-RTO: 0 /100 WBC (ref 0–0.2)
PLATELET # BLD AUTO: 176 10*3/MM3 (ref 140–450)
PMV BLD AUTO: 12.9 FL (ref 6–12)
RBC # BLD AUTO: 4.01 10*6/MM3 (ref 3.77–5.28)
WBC NRBC COR # BLD AUTO: 9.01 10*3/MM3 (ref 3.4–10.8)

## 2025-08-07 PROCEDURE — 86901 BLOOD TYPING SEROLOGIC RH(D): CPT | Performed by: OBSTETRICS & GYNECOLOGY

## 2025-08-07 PROCEDURE — 85025 COMPLETE CBC W/AUTO DIFF WBC: CPT | Performed by: OBSTETRICS & GYNECOLOGY

## 2025-08-07 PROCEDURE — 86780 TREPONEMA PALLIDUM: CPT | Performed by: OBSTETRICS & GYNECOLOGY

## 2025-08-07 PROCEDURE — 3E0P7VZ INTRODUCTION OF HORMONE INTO FEMALE REPRODUCTIVE, VIA NATURAL OR ARTIFICIAL OPENING: ICD-10-PCS | Performed by: OBSTETRICS & GYNECOLOGY

## 2025-08-07 PROCEDURE — 86850 RBC ANTIBODY SCREEN: CPT | Performed by: OBSTETRICS & GYNECOLOGY

## 2025-08-07 PROCEDURE — 86900 BLOOD TYPING SEROLOGIC ABO: CPT | Performed by: OBSTETRICS & GYNECOLOGY

## 2025-08-07 RX ORDER — SODIUM CHLORIDE 0.9 % (FLUSH) 0.9 %
10 SYRINGE (ML) INJECTION AS NEEDED
Status: DISCONTINUED | OUTPATIENT
Start: 2025-08-07 | End: 2025-08-08 | Stop reason: HOSPADM

## 2025-08-07 RX ORDER — ACETAMINOPHEN 325 MG/1
650 TABLET ORAL EVERY 4 HOURS PRN
Status: DISCONTINUED | OUTPATIENT
Start: 2025-08-07 | End: 2025-08-08

## 2025-08-07 RX ORDER — LIDOCAINE HYDROCHLORIDE 10 MG/ML
0.5 INJECTION, SOLUTION EPIDURAL; INFILTRATION; INTRACAUDAL; PERINEURAL ONCE AS NEEDED
Status: CANCELLED | OUTPATIENT
Start: 2025-08-07

## 2025-08-07 RX ORDER — ONDANSETRON 4 MG/1
4 TABLET, ORALLY DISINTEGRATING ORAL EVERY 6 HOURS PRN
Status: CANCELLED | OUTPATIENT
Start: 2025-08-07

## 2025-08-07 RX ORDER — SODIUM CHLORIDE 0.9 % (FLUSH) 0.9 %
10 SYRINGE (ML) INJECTION EVERY 12 HOURS SCHEDULED
Status: CANCELLED | OUTPATIENT
Start: 2025-08-07

## 2025-08-07 RX ORDER — ONDANSETRON 2 MG/ML
4 INJECTION INTRAMUSCULAR; INTRAVENOUS EVERY 6 HOURS PRN
Status: DISCONTINUED | OUTPATIENT
Start: 2025-08-07 | End: 2025-08-08 | Stop reason: HOSPADM

## 2025-08-07 RX ORDER — IBUPROFEN 600 MG/1
600 TABLET, FILM COATED ORAL EVERY 6 HOURS PRN
Status: CANCELLED | OUTPATIENT
Start: 2025-08-07

## 2025-08-07 RX ORDER — ACETAMINOPHEN 325 MG/1
650 TABLET ORAL EVERY 4 HOURS PRN
Status: CANCELLED | OUTPATIENT
Start: 2025-08-07

## 2025-08-07 RX ORDER — OXYTOCIN/0.9 % SODIUM CHLORIDE 30/500 ML
250 PLASTIC BAG, INJECTION (ML) INTRAVENOUS CONTINUOUS
Status: CANCELLED | OUTPATIENT
Start: 2025-08-07 | End: 2025-08-07

## 2025-08-07 RX ORDER — SODIUM CHLORIDE 0.9 % (FLUSH) 0.9 %
10 SYRINGE (ML) INJECTION EVERY 12 HOURS SCHEDULED
Status: DISCONTINUED | OUTPATIENT
Start: 2025-08-07 | End: 2025-08-08

## 2025-08-07 RX ORDER — SODIUM CHLORIDE, SODIUM LACTATE, POTASSIUM CHLORIDE, CALCIUM CHLORIDE 600; 310; 30; 20 MG/100ML; MG/100ML; MG/100ML; MG/100ML
125 INJECTION, SOLUTION INTRAVENOUS CONTINUOUS
Status: CANCELLED | OUTPATIENT
Start: 2025-08-07 | End: 2025-08-10

## 2025-08-07 RX ORDER — ONDANSETRON 4 MG/1
4 TABLET, ORALLY DISINTEGRATING ORAL EVERY 6 HOURS PRN
Status: DISCONTINUED | OUTPATIENT
Start: 2025-08-07 | End: 2025-08-08 | Stop reason: HOSPADM

## 2025-08-07 RX ORDER — ONDANSETRON 2 MG/ML
4 INJECTION INTRAMUSCULAR; INTRAVENOUS EVERY 6 HOURS PRN
Status: CANCELLED | OUTPATIENT
Start: 2025-08-07

## 2025-08-07 RX ORDER — SODIUM CHLORIDE 0.9 % (FLUSH) 0.9 %
10 SYRINGE (ML) INJECTION AS NEEDED
Status: CANCELLED | OUTPATIENT
Start: 2025-08-07

## 2025-08-07 RX ORDER — LIDOCAINE HYDROCHLORIDE 10 MG/ML
0.5 INJECTION, SOLUTION EPIDURAL; INFILTRATION; INTRACAUDAL; PERINEURAL ONCE AS NEEDED
Status: DISCONTINUED | OUTPATIENT
Start: 2025-08-07 | End: 2025-08-08 | Stop reason: HOSPADM

## 2025-08-07 RX ORDER — SODIUM CHLORIDE 9 MG/ML
40 INJECTION, SOLUTION INTRAVENOUS AS NEEDED
Status: DISCONTINUED | OUTPATIENT
Start: 2025-08-07 | End: 2025-08-08

## 2025-08-07 RX ORDER — OXYTOCIN/0.9 % SODIUM CHLORIDE 30/500 ML
999 PLASTIC BAG, INJECTION (ML) INTRAVENOUS ONCE
Status: CANCELLED | OUTPATIENT
Start: 2025-08-07 | End: 2025-08-07

## 2025-08-07 RX ORDER — CARBOPROST TROMETHAMINE 250 UG/ML
250 INJECTION, SOLUTION INTRAMUSCULAR AS NEEDED
Status: CANCELLED | OUTPATIENT
Start: 2025-08-07

## 2025-08-07 RX ORDER — SODIUM CHLORIDE 9 MG/ML
40 INJECTION, SOLUTION INTRAVENOUS AS NEEDED
Status: CANCELLED | OUTPATIENT
Start: 2025-08-07

## 2025-08-07 RX ORDER — SODIUM CHLORIDE, SODIUM LACTATE, POTASSIUM CHLORIDE, CALCIUM CHLORIDE 600; 310; 30; 20 MG/100ML; MG/100ML; MG/100ML; MG/100ML
125 INJECTION, SOLUTION INTRAVENOUS CONTINUOUS
Status: DISCONTINUED | OUTPATIENT
Start: 2025-08-07 | End: 2025-08-08

## 2025-08-07 RX ORDER — MISOPROSTOL 200 UG/1
800 TABLET ORAL AS NEEDED
Status: CANCELLED | OUTPATIENT
Start: 2025-08-07

## 2025-08-07 RX ORDER — METHYLERGONOVINE MALEATE 0.2 MG/ML
200 INJECTION INTRAVENOUS ONCE AS NEEDED
Status: CANCELLED | OUTPATIENT
Start: 2025-08-07

## 2025-08-07 RX ADMIN — DINOPROSTONE 10 MG: 10 INSERT VAGINAL at 23:26

## 2025-08-08 ENCOUNTER — ANESTHESIA EVENT (OUTPATIENT)
Dept: LABOR AND DELIVERY | Facility: HOSPITAL | Age: 25
End: 2025-08-08
Payer: MEDICAID

## 2025-08-08 ENCOUNTER — ANESTHESIA (OUTPATIENT)
Dept: LABOR AND DELIVERY | Facility: HOSPITAL | Age: 25
End: 2025-08-08
Payer: MEDICAID

## 2025-08-08 LAB
ABO GROUP BLD: NORMAL
ABO GROUP BLD: NORMAL
BLD GP AB SCN SERPL QL: NEGATIVE
FETAL BLEED: NEGATIVE
NUMBER OF DOSES: NORMAL
RH BLD: NEGATIVE
RH BLD: NEGATIVE
T&S EXPIRATION DATE: NORMAL
TREPONEMA PALLIDUM IGG+IGM AB [PRESENCE] IN SERUM OR PLASMA BY IMMUNOASSAY: NORMAL

## 2025-08-08 PROCEDURE — 25010000002 RHO D IMMUNE GLOBULIN 1500 UNIT/2ML SOLUTION PREFILLED SYRINGE: Performed by: OBSTETRICS & GYNECOLOGY

## 2025-08-08 PROCEDURE — 85461 HEMOGLOBIN FETAL: CPT | Performed by: OBSTETRICS & GYNECOLOGY

## 2025-08-08 PROCEDURE — 25810000003 LACTATED RINGERS SOLUTION: Performed by: OBSTETRICS & GYNECOLOGY

## 2025-08-08 PROCEDURE — 86900 BLOOD TYPING SEROLOGIC ABO: CPT | Performed by: OBSTETRICS & GYNECOLOGY

## 2025-08-08 PROCEDURE — 86901 BLOOD TYPING SEROLOGIC RH(D): CPT | Performed by: OBSTETRICS & GYNECOLOGY

## 2025-08-08 PROCEDURE — 25010000002 LIDOCAINE-EPINEPHRINE (PF) 1.5 %-1:200000 SOLUTION: Performed by: ANESTHESIOLOGY

## 2025-08-08 PROCEDURE — 25010000002 ONDANSETRON PER 1 MG: Performed by: OBSTETRICS & GYNECOLOGY

## 2025-08-08 PROCEDURE — C1755 CATHETER, INTRASPINAL: HCPCS | Performed by: ANESTHESIOLOGY

## 2025-08-08 PROCEDURE — 25010000002 MORPHINE PER 10 MG: Performed by: OBSTETRICS & GYNECOLOGY

## 2025-08-08 RX ORDER — ONDANSETRON 4 MG/1
4 TABLET, ORALLY DISINTEGRATING ORAL EVERY 6 HOURS PRN
Status: DISCONTINUED | OUTPATIENT
Start: 2025-08-08 | End: 2025-08-08 | Stop reason: HOSPADM

## 2025-08-08 RX ORDER — BISACODYL 10 MG
10 SUPPOSITORY, RECTAL RECTAL DAILY PRN
Status: DISCONTINUED | OUTPATIENT
Start: 2025-08-09 | End: 2025-08-09 | Stop reason: HOSPADM

## 2025-08-08 RX ORDER — IBUPROFEN 600 MG/1
600 TABLET, FILM COATED ORAL EVERY 6 HOURS PRN
Status: DISCONTINUED | OUTPATIENT
Start: 2025-08-08 | End: 2025-08-08 | Stop reason: HOSPADM

## 2025-08-08 RX ORDER — PRENATAL VIT/IRON FUM/FOLIC AC 27MG-0.8MG
1 TABLET ORAL DAILY
Status: DISCONTINUED | OUTPATIENT
Start: 2025-08-08 | End: 2025-08-09 | Stop reason: HOSPADM

## 2025-08-08 RX ORDER — SODIUM CHLORIDE 0.9 % (FLUSH) 0.9 %
1-10 SYRINGE (ML) INJECTION AS NEEDED
Status: DISCONTINUED | OUTPATIENT
Start: 2025-08-08 | End: 2025-08-09 | Stop reason: HOSPADM

## 2025-08-08 RX ORDER — FENTANYL/BUPIVACAINE/NS/PF 2-1250MCG
PLASTIC BAG, INJECTION (ML) INJECTION CONTINUOUS
Refills: 0 | Status: DISCONTINUED | OUTPATIENT
Start: 2025-08-08 | End: 2025-08-08

## 2025-08-08 RX ORDER — METHYLERGONOVINE MALEATE 0.2 MG/ML
200 INJECTION INTRAVENOUS ONCE AS NEEDED
Status: DISCONTINUED | OUTPATIENT
Start: 2025-08-08 | End: 2025-08-08 | Stop reason: HOSPADM

## 2025-08-08 RX ORDER — DOCUSATE SODIUM 100 MG/1
100 CAPSULE, LIQUID FILLED ORAL 2 TIMES DAILY
Status: DISCONTINUED | OUTPATIENT
Start: 2025-08-08 | End: 2025-08-09 | Stop reason: HOSPADM

## 2025-08-08 RX ORDER — HYDROCORTISONE ACETATE PRAMOXINE HCL 2.5; 1 G/100G; G/100G
1 CREAM TOPICAL AS NEEDED
Status: DISCONTINUED | OUTPATIENT
Start: 2025-08-08 | End: 2025-08-09 | Stop reason: HOSPADM

## 2025-08-08 RX ORDER — HYDROCODONE BITARTRATE AND ACETAMINOPHEN 5; 325 MG/1; MG/1
1 TABLET ORAL EVERY 4 HOURS PRN
Status: DISCONTINUED | OUTPATIENT
Start: 2025-08-08 | End: 2025-08-09 | Stop reason: HOSPADM

## 2025-08-08 RX ORDER — OXYTOCIN/0.9 % SODIUM CHLORIDE 30/500 ML
999 PLASTIC BAG, INJECTION (ML) INTRAVENOUS ONCE
Status: COMPLETED | OUTPATIENT
Start: 2025-08-08 | End: 2025-08-08

## 2025-08-08 RX ORDER — ACETAMINOPHEN 325 MG/1
650 TABLET ORAL EVERY 6 HOURS PRN
Status: DISCONTINUED | OUTPATIENT
Start: 2025-08-08 | End: 2025-08-09 | Stop reason: HOSPADM

## 2025-08-08 RX ORDER — FENTANYL/BUPIVACAINE/NS/PF 2-1250MCG
PLASTIC BAG, INJECTION (ML) INJECTION
Status: COMPLETED
Start: 2025-08-08 | End: 2025-08-08

## 2025-08-08 RX ORDER — HYDROCODONE BITARTRATE AND ACETAMINOPHEN 10; 325 MG/1; MG/1
1 TABLET ORAL EVERY 4 HOURS PRN
Status: DISCONTINUED | OUTPATIENT
Start: 2025-08-08 | End: 2025-08-09 | Stop reason: HOSPADM

## 2025-08-08 RX ORDER — LIDOCAINE HYDROCHLORIDE AND EPINEPHRINE 15; 5 MG/ML; UG/ML
INJECTION, SOLUTION EPIDURAL
Status: COMPLETED
Start: 2025-08-08 | End: 2025-08-08

## 2025-08-08 RX ORDER — DIPHENHYDRAMINE HCL 25 MG
25 CAPSULE ORAL NIGHTLY PRN
Status: DISCONTINUED | OUTPATIENT
Start: 2025-08-08 | End: 2025-08-09 | Stop reason: HOSPADM

## 2025-08-08 RX ORDER — ONDANSETRON 2 MG/ML
4 INJECTION INTRAMUSCULAR; INTRAVENOUS EVERY 6 HOURS PRN
Status: DISCONTINUED | OUTPATIENT
Start: 2025-08-08 | End: 2025-08-08 | Stop reason: HOSPADM

## 2025-08-08 RX ORDER — IBUPROFEN 600 MG/1
600 TABLET, FILM COATED ORAL EVERY 6 HOURS PRN
Status: DISCONTINUED | OUTPATIENT
Start: 2025-08-08 | End: 2025-08-09 | Stop reason: HOSPADM

## 2025-08-08 RX ORDER — MISOPROSTOL 200 UG/1
800 TABLET ORAL AS NEEDED
Status: DISCONTINUED | OUTPATIENT
Start: 2025-08-08 | End: 2025-08-08 | Stop reason: HOSPADM

## 2025-08-08 RX ORDER — ACETAMINOPHEN 325 MG/1
650 TABLET ORAL EVERY 4 HOURS PRN
Status: DISCONTINUED | OUTPATIENT
Start: 2025-08-08 | End: 2025-08-08

## 2025-08-08 RX ORDER — OXYTOCIN/0.9 % SODIUM CHLORIDE 30/500 ML
125 PLASTIC BAG, INJECTION (ML) INTRAVENOUS ONCE AS NEEDED
Status: DISCONTINUED | OUTPATIENT
Start: 2025-08-08 | End: 2025-08-09 | Stop reason: HOSPADM

## 2025-08-08 RX ORDER — EPHEDRINE SULFATE 5 MG/ML
10 INJECTION INTRAVENOUS
Status: DISCONTINUED | OUTPATIENT
Start: 2025-08-08 | End: 2025-08-08

## 2025-08-08 RX ORDER — FENTANYL/BUPIVACAINE/NS/PF 2-1250MCG
PLASTIC BAG, INJECTION (ML) INJECTION CONTINUOUS PRN
Status: DISCONTINUED | OUTPATIENT
Start: 2025-08-08 | End: 2025-08-08 | Stop reason: SURG

## 2025-08-08 RX ORDER — ONDANSETRON 2 MG/ML
4 INJECTION INTRAMUSCULAR; INTRAVENOUS EVERY 6 HOURS PRN
Status: DISCONTINUED | OUTPATIENT
Start: 2025-08-08 | End: 2025-08-09 | Stop reason: HOSPADM

## 2025-08-08 RX ORDER — OXYTOCIN/0.9 % SODIUM CHLORIDE 30/500 ML
250 PLASTIC BAG, INJECTION (ML) INTRAVENOUS CONTINUOUS
Status: ACTIVE | OUTPATIENT
Start: 2025-08-08 | End: 2025-08-08

## 2025-08-08 RX ORDER — LIDOCAINE HYDROCHLORIDE AND EPINEPHRINE 15; 5 MG/ML; UG/ML
INJECTION, SOLUTION EPIDURAL AS NEEDED
Status: DISCONTINUED | OUTPATIENT
Start: 2025-08-08 | End: 2025-08-08 | Stop reason: SURG

## 2025-08-08 RX ORDER — CARBOPROST TROMETHAMINE 250 UG/ML
250 INJECTION, SOLUTION INTRAMUSCULAR AS NEEDED
Status: DISCONTINUED | OUTPATIENT
Start: 2025-08-08 | End: 2025-08-08

## 2025-08-08 RX ADMIN — MORPHINE SULFATE 4 MG: 4 INJECTION, SOLUTION INTRAMUSCULAR; INTRAVENOUS at 01:47

## 2025-08-08 RX ADMIN — PRENATAL VITAMINS-IRON FUMARATE 27 MG IRON-FOLIC ACID 0.8 MG TABLET 1 TABLET: at 10:48

## 2025-08-08 RX ADMIN — ONDANSETRON 4 MG: 2 INJECTION INTRAMUSCULAR; INTRAVENOUS at 10:00

## 2025-08-08 RX ADMIN — LIDOCAINE HYDROCHLORIDE,EPINEPHRINE BITARTRATE 5 ML: 15; .005 INJECTION, SOLUTION EPIDURAL; INFILTRATION; INTRACAUDAL; PERINEURAL at 02:18

## 2025-08-08 RX ADMIN — DOCUSATE SODIUM 100 MG: 100 CAPSULE, LIQUID FILLED ORAL at 10:48

## 2025-08-08 RX ADMIN — Medication: at 09:30

## 2025-08-08 RX ADMIN — Medication 10 ML/HR: at 02:22

## 2025-08-08 RX ADMIN — HYDROCODONE BITARTRATE AND ACETAMINOPHEN 1 TABLET: 5; 325 TABLET ORAL at 23:31

## 2025-08-08 RX ADMIN — Medication 999 ML/HR: at 06:32

## 2025-08-08 RX ADMIN — SODIUM CHLORIDE, POTASSIUM CHLORIDE, SODIUM LACTATE AND CALCIUM CHLORIDE 1000 ML: 600; 310; 30; 20 INJECTION, SOLUTION INTRAVENOUS at 01:41

## 2025-08-08 RX ADMIN — IBUPROFEN 600 MG: 600 TABLET ORAL at 10:00

## 2025-08-08 RX ADMIN — ACETAMINOPHEN 650 MG: 325 TABLET, FILM COATED ORAL at 04:57

## 2025-08-08 RX ADMIN — ACETAMINOPHEN 650 MG: 325 TABLET, FILM COATED ORAL at 00:56

## 2025-08-08 RX ADMIN — IBUPROFEN 600 MG: 600 TABLET ORAL at 21:08

## 2025-08-08 RX ADMIN — HUMAN RHO(D) IMMUNE GLOBULIN 1500 UNITS: 1500 SOLUTION INTRAMUSCULAR; INTRAVENOUS at 11:01

## 2025-08-08 RX ADMIN — IBUPROFEN 600 MG: 600 TABLET ORAL at 15:25

## 2025-08-08 RX ADMIN — WITCH HAZEL: 500 SOLUTION RECTAL; TOPICAL at 09:30

## 2025-08-08 RX ADMIN — ACETAMINOPHEN 650 MG: 325 TABLET ORAL at 18:24

## 2025-08-08 RX ADMIN — ONDANSETRON 4 MG: 2 INJECTION INTRAMUSCULAR; INTRAVENOUS at 01:52

## 2025-08-08 RX ADMIN — DOCUSATE SODIUM 100 MG: 100 CAPSULE, LIQUID FILLED ORAL at 21:08

## 2025-08-09 VITALS
HEART RATE: 74 BPM | OXYGEN SATURATION: 95 % | DIASTOLIC BLOOD PRESSURE: 77 MMHG | BODY MASS INDEX: 38.46 KG/M2 | HEIGHT: 62 IN | SYSTOLIC BLOOD PRESSURE: 119 MMHG | RESPIRATION RATE: 20 BRPM | TEMPERATURE: 98.6 F | WEIGHT: 209 LBS

## 2025-08-09 LAB
BASOPHILS # BLD AUTO: 0.03 10*3/MM3 (ref 0–0.2)
BASOPHILS NFR BLD AUTO: 0.3 % (ref 0–1.5)
DEPRECATED RDW RBC AUTO: 49.9 FL (ref 37–54)
EOSINOPHIL # BLD AUTO: 0.15 10*3/MM3 (ref 0–0.4)
EOSINOPHIL NFR BLD AUTO: 1.6 % (ref 0.3–6.2)
ERYTHROCYTE [DISTWIDTH] IN BLOOD BY AUTOMATED COUNT: 15.6 % (ref 12.3–15.4)
HCT VFR BLD AUTO: 33.2 % (ref 34–46.6)
HGB BLD-MCNC: 10.7 G/DL (ref 12–15.9)
IMM GRANULOCYTES # BLD AUTO: 0.08 10*3/MM3 (ref 0–0.05)
IMM GRANULOCYTES NFR BLD AUTO: 0.9 % (ref 0–0.5)
LYMPHOCYTES # BLD AUTO: 1.92 10*3/MM3 (ref 0.7–3.1)
LYMPHOCYTES NFR BLD AUTO: 20.6 % (ref 19.6–45.3)
MCH RBC QN AUTO: 28.7 PG (ref 26.6–33)
MCHC RBC AUTO-ENTMCNC: 32.2 G/DL (ref 31.5–35.7)
MCV RBC AUTO: 89 FL (ref 79–97)
MONOCYTES # BLD AUTO: 0.59 10*3/MM3 (ref 0.1–0.9)
MONOCYTES NFR BLD AUTO: 6.3 % (ref 5–12)
NEUTROPHILS NFR BLD AUTO: 6.56 10*3/MM3 (ref 1.7–7)
NEUTROPHILS NFR BLD AUTO: 70.3 % (ref 42.7–76)
NRBC BLD AUTO-RTO: 0 /100 WBC (ref 0–0.2)
PLATELET # BLD AUTO: 160 10*3/MM3 (ref 140–450)
PMV BLD AUTO: 12.6 FL (ref 6–12)
RBC # BLD AUTO: 3.73 10*6/MM3 (ref 3.77–5.28)
WBC NRBC COR # BLD AUTO: 9.33 10*3/MM3 (ref 3.4–10.8)

## 2025-08-09 PROCEDURE — 85025 COMPLETE CBC W/AUTO DIFF WBC: CPT | Performed by: OBSTETRICS & GYNECOLOGY

## 2025-08-09 RX ADMIN — HYDROCODONE BITARTRATE AND ACETAMINOPHEN 1 TABLET: 5; 325 TABLET ORAL at 08:18

## 2025-08-09 RX ADMIN — BISACODYL 10 MG: 10 SUPPOSITORY RECTAL at 12:19

## 2025-08-09 RX ADMIN — IBUPROFEN 600 MG: 600 TABLET ORAL at 12:19

## 2025-08-09 RX ADMIN — MAGNESIUM HYDROXIDE 10 ML: 2400 SUSPENSION ORAL at 10:31

## 2025-08-09 RX ADMIN — PRENATAL VITAMINS-IRON FUMARATE 27 MG IRON-FOLIC ACID 0.8 MG TABLET 1 TABLET: at 08:18

## 2025-08-09 RX ADMIN — DOCUSATE SODIUM 100 MG: 100 CAPSULE, LIQUID FILLED ORAL at 08:18

## 2025-08-18 ENCOUNTER — PATIENT OUTREACH (OUTPATIENT)
Dept: LABOR AND DELIVERY | Facility: HOSPITAL | Age: 25
End: 2025-08-18
Payer: MEDICAID

## 2025-08-25 ENCOUNTER — PATIENT OUTREACH (OUTPATIENT)
Dept: CALL CENTER | Facility: HOSPITAL | Age: 25
End: 2025-08-25
Payer: MEDICAID

## 2025-08-27 ENCOUNTER — TELEPHONE (OUTPATIENT)
Dept: NEUROLOGY | Facility: CLINIC | Age: 25
End: 2025-08-27
Payer: MEDICAID

## 2025-08-27 ENCOUNTER — TELEPHONE (OUTPATIENT)
Dept: NEUROSURGERY | Facility: CLINIC | Age: 25
End: 2025-08-27
Payer: MEDICAID